# Patient Record
Sex: FEMALE | Race: WHITE | Employment: OTHER | URBAN - METROPOLITAN AREA
[De-identification: names, ages, dates, MRNs, and addresses within clinical notes are randomized per-mention and may not be internally consistent; named-entity substitution may affect disease eponyms.]

---

## 2016-02-13 LAB
LEFT VENTRICULAR EJECTION FRACTION HIGH VALUE: 50 %
LEFT VENTRICULAR EJECTION FRACTION MODE: NORMAL
LV EF: 50 %

## 2017-01-09 ENCOUNTER — OFFICE VISIT (OUTPATIENT)
Dept: CARDIOLOGY CLINIC | Age: 82
End: 2017-01-09

## 2017-01-09 VITALS
HEART RATE: 80 BPM | BODY MASS INDEX: 36.69 KG/M2 | DIASTOLIC BLOOD PRESSURE: 54 MMHG | SYSTOLIC BLOOD PRESSURE: 116 MMHG | HEIGHT: 59 IN | WEIGHT: 182 LBS

## 2017-01-09 DIAGNOSIS — N18.30 CHRONIC KIDNEY DISEASE, STAGE III (MODERATE) (HCC): ICD-10-CM

## 2017-01-09 DIAGNOSIS — I10 ESSENTIAL HYPERTENSION: ICD-10-CM

## 2017-01-09 DIAGNOSIS — I50.42 CHRONIC COMBINED SYSTOLIC AND DIASTOLIC CONGESTIVE HEART FAILURE (HCC): Primary | ICD-10-CM

## 2017-01-09 DIAGNOSIS — Z95.810 BIVENTRICULAR IMPLANTABLE CARDIOVERTER-DEFIBRILLATOR IN SITU: ICD-10-CM

## 2017-01-09 DIAGNOSIS — E11.22 TYPE 2 DIABETES MELLITUS WITH STAGE 3 CHRONIC KIDNEY DISEASE, WITH LONG-TERM CURRENT USE OF INSULIN (HCC): ICD-10-CM

## 2017-01-09 DIAGNOSIS — N18.9 ANEMIA IN CKD (CHRONIC KIDNEY DISEASE): ICD-10-CM

## 2017-01-09 DIAGNOSIS — D63.1 ANEMIA IN CKD (CHRONIC KIDNEY DISEASE): ICD-10-CM

## 2017-01-09 DIAGNOSIS — Z79.4 TYPE 2 DIABETES MELLITUS WITH STAGE 3 CHRONIC KIDNEY DISEASE, WITH LONG-TERM CURRENT USE OF INSULIN (HCC): ICD-10-CM

## 2017-01-09 DIAGNOSIS — I42.8 OTHER PRIMARY CARDIOMYOPATHIES: ICD-10-CM

## 2017-01-09 DIAGNOSIS — N18.30 TYPE 2 DIABETES MELLITUS WITH STAGE 3 CHRONIC KIDNEY DISEASE, WITH LONG-TERM CURRENT USE OF INSULIN (HCC): ICD-10-CM

## 2017-01-09 PROCEDURE — 99213 OFFICE O/P EST LOW 20 MIN: CPT | Performed by: NURSE PRACTITIONER

## 2017-01-10 ENCOUNTER — HOSPITAL ENCOUNTER (OUTPATIENT)
Dept: OTHER | Age: 82
Discharge: HOME | End: 2017-01-10
Attending: NURSE PRACTITIONER | Admitting: NURSE PRACTITIONER

## 2017-01-10 ENCOUNTER — HOSPITAL ENCOUNTER (OUTPATIENT)
Dept: OTHER | Age: 82
Discharge: HOME | End: 2017-01-10
Attending: FAMILY MEDICINE | Admitting: FAMILY MEDICINE

## 2017-01-10 ENCOUNTER — TELEPHONE (OUTPATIENT)
Dept: CARDIOLOGY CLINIC | Age: 82
End: 2017-01-10

## 2017-01-10 LAB
A/G RATIO: 1.3 (ref 1.1–2.2)
ALBUMIN SERPL-MCNC: 4.2 G/DL (ref 3.4–5)
ALP BLD-CCNC: 50 U/L (ref 40–129)
ALT SERPL-CCNC: 22 U/L (ref 10–40)
ANION GAP SERPL CALCULATED.3IONS-SCNC: 16 MMOL/L (ref 3–16)
AST SERPL-CCNC: 23 U/L (ref 15–37)
BASOPHILS ABSOLUTE: 0.1 K/UL (ref 0–0.2)
BASOPHILS RELATIVE PERCENT: 2.2 %
BILIRUB SERPL-MCNC: 0.3 MG/DL (ref 0–1)
BUN BLDV-MCNC: 71 MG/DL (ref 7–20)
CALCIUM SERPL-MCNC: 9.8 MG/DL (ref 8.3–10.6)
CHLORIDE BLD-SCNC: 104 MMOL/L (ref 99–110)
CHOLESTEROL, TOTAL: 182 MG/DL (ref 0–199)
CO2: 22 MMOL/L (ref 21–32)
CREAT SERPL-MCNC: 2 MG/DL (ref 0.6–1.2)
CREATININE URINE: 101.3 MG/DL (ref 28–259)
EOSINOPHILS ABSOLUTE: 0.4 K/UL (ref 0–0.6)
EOSINOPHILS RELATIVE PERCENT: 6.6 %
ESTIMATED AVERAGE GLUCOSE: 145.6 MG/DL
GFR AFRICAN AMERICAN: 28
GFR NON-AFRICAN AMERICAN: 24
GLOBULIN: 3.2 G/DL
GLUCOSE BLD-MCNC: 141 MG/DL (ref 70–99)
HBA1C MFR BLD: 6.7 %
HCT VFR BLD CALC: 32.5 % (ref 36–48)
HDLC SERPL-MCNC: 47 MG/DL (ref 40–60)
HEMOGLOBIN: 10.7 G/DL (ref 12–16)
LDL CHOLESTEROL CALCULATED: 105 MG/DL
LYMPHOCYTES ABSOLUTE: 2.1 K/UL (ref 1–5.1)
LYMPHOCYTES RELATIVE PERCENT: 31.9 %
MCH RBC QN AUTO: 32.2 PG (ref 26–34)
MCHC RBC AUTO-ENTMCNC: 32.9 G/DL (ref 31–36)
MCV RBC AUTO: 97.9 FL (ref 80–100)
MICROALBUMIN UR-MCNC: 11.8 MG/DL
MICROALBUMIN/CREAT UR-RTO: 116.5 MG/G (ref 0–30)
MONOCYTES ABSOLUTE: 0.6 K/UL (ref 0–1.3)
MONOCYTES RELATIVE PERCENT: 9.5 %
NEUTROPHILS ABSOLUTE: 3.3 K/UL (ref 1.7–7.7)
NEUTROPHILS RELATIVE PERCENT: 49.8 %
PDW BLD-RTO: 16.1 % (ref 12.4–15.4)
PLATELET # BLD: 213 K/UL (ref 135–450)
PMV BLD AUTO: 10.1 FL (ref 5–10.5)
POTASSIUM SERPL-SCNC: 4.8 MMOL/L (ref 3.5–5.1)
PRO-BNP: 547 PG/ML (ref 0–449)
RBC # BLD: 3.32 M/UL (ref 4–5.2)
SODIUM BLD-SCNC: 142 MMOL/L (ref 136–145)
TOTAL PROTEIN: 7.4 G/DL (ref 6.4–8.2)
TRIGL SERPL-MCNC: 149 MG/DL (ref 0–150)
VLDLC SERPL CALC-MCNC: 30 MG/DL
WBC # BLD: 6.7 K/UL (ref 4–11)

## 2017-01-13 ENCOUNTER — OFFICE VISIT (OUTPATIENT)
Dept: FAMILY MEDICINE CLINIC | Age: 82
End: 2017-01-13

## 2017-01-13 VITALS
OXYGEN SATURATION: 98 % | BODY MASS INDEX: 36.76 KG/M2 | WEIGHT: 182 LBS | SYSTOLIC BLOOD PRESSURE: 124 MMHG | HEART RATE: 79 BPM | DIASTOLIC BLOOD PRESSURE: 68 MMHG

## 2017-01-13 DIAGNOSIS — N18.4 TYPE 2 DIABETES MELLITUS WITH STAGE 4 CHRONIC KIDNEY DISEASE, WITH LONG-TERM CURRENT USE OF INSULIN (HCC): Primary | ICD-10-CM

## 2017-01-13 DIAGNOSIS — I10 ESSENTIAL HYPERTENSION: ICD-10-CM

## 2017-01-13 DIAGNOSIS — I50.22 CHRONIC SYSTOLIC HEART FAILURE (HCC): ICD-10-CM

## 2017-01-13 DIAGNOSIS — D63.1 ANEMIA IN CKD (CHRONIC KIDNEY DISEASE): ICD-10-CM

## 2017-01-13 DIAGNOSIS — J41.0 SIMPLE CHRONIC BRONCHITIS (HCC): ICD-10-CM

## 2017-01-13 DIAGNOSIS — E11.22 TYPE 2 DIABETES MELLITUS WITH STAGE 4 CHRONIC KIDNEY DISEASE, WITH LONG-TERM CURRENT USE OF INSULIN (HCC): Primary | ICD-10-CM

## 2017-01-13 DIAGNOSIS — Z79.4 TYPE 2 DIABETES MELLITUS WITH STAGE 4 CHRONIC KIDNEY DISEASE, WITH LONG-TERM CURRENT USE OF INSULIN (HCC): Primary | ICD-10-CM

## 2017-01-13 DIAGNOSIS — N18.9 ANEMIA IN CKD (CHRONIC KIDNEY DISEASE): ICD-10-CM

## 2017-01-13 PROCEDURE — G8419 CALC BMI OUT NRM PARAM NOF/U: HCPCS | Performed by: FAMILY MEDICINE

## 2017-01-13 PROCEDURE — G8926 SPIRO NO PERF OR DOC: HCPCS | Performed by: FAMILY MEDICINE

## 2017-01-13 PROCEDURE — G8484 FLU IMMUNIZE NO ADMIN: HCPCS | Performed by: FAMILY MEDICINE

## 2017-01-13 PROCEDURE — 1123F ACP DISCUSS/DSCN MKR DOCD: CPT | Performed by: FAMILY MEDICINE

## 2017-01-13 PROCEDURE — 4040F PNEUMOC VAC/ADMIN/RCVD: CPT | Performed by: FAMILY MEDICINE

## 2017-01-13 PROCEDURE — 1036F TOBACCO NON-USER: CPT | Performed by: FAMILY MEDICINE

## 2017-01-13 PROCEDURE — 1090F PRES/ABSN URINE INCON ASSESS: CPT | Performed by: FAMILY MEDICINE

## 2017-01-13 PROCEDURE — 99214 OFFICE O/P EST MOD 30 MIN: CPT | Performed by: FAMILY MEDICINE

## 2017-01-13 PROCEDURE — G8427 DOCREV CUR MEDS BY ELIG CLIN: HCPCS | Performed by: FAMILY MEDICINE

## 2017-01-13 PROCEDURE — 3023F SPIROM DOC REV: CPT | Performed by: FAMILY MEDICINE

## 2017-01-13 ASSESSMENT — ENCOUNTER SYMPTOMS
BACK PAIN: 0
ABDOMINAL PAIN: 0
SHORTNESS OF BREATH: 1
CONSTIPATION: 0
TROUBLE SWALLOWING: 0
WHEEZING: 0
COUGH: 0

## 2017-02-08 ENCOUNTER — NURSE ONLY (OUTPATIENT)
Dept: CARDIOLOGY CLINIC | Age: 82
End: 2017-02-08

## 2017-02-08 DIAGNOSIS — I42.8 OTHER PRIMARY CARDIOMYOPATHIES: ICD-10-CM

## 2017-02-08 DIAGNOSIS — Z95.810 AUTOMATIC IMPLANTABLE CARDIOVERTER-DEFIBRILLATOR IN SITU: Chronic | ICD-10-CM

## 2017-02-08 DIAGNOSIS — I50.22 CHRONIC SYSTOLIC HEART FAILURE (HCC): ICD-10-CM

## 2017-02-08 PROCEDURE — 93296 REM INTERROG EVL PM/IDS: CPT | Performed by: INTERNAL MEDICINE

## 2017-02-08 PROCEDURE — 93295 DEV INTERROG REMOTE 1/2/MLT: CPT | Performed by: INTERNAL MEDICINE

## 2017-02-08 PROCEDURE — 93297 REM INTERROG DEV EVAL ICPMS: CPT | Performed by: INTERNAL MEDICINE

## 2017-02-14 ENCOUNTER — HOSPITAL ENCOUNTER (OUTPATIENT)
Dept: OTHER | Age: 82
Discharge: HOME | End: 2017-02-14
Attending: FAMILY MEDICINE | Admitting: FAMILY MEDICINE

## 2017-02-14 LAB
ANION GAP SERPL CALCULATED.3IONS-SCNC: 17 MMOL/L (ref 3–16)
BUN BLDV-MCNC: 67 MG/DL (ref 7–20)
CALCIUM SERPL-MCNC: 9.8 MG/DL (ref 8.3–10.6)
CHLORIDE BLD-SCNC: 107 MMOL/L (ref 99–110)
CO2: 20 MMOL/L (ref 21–32)
CREAT SERPL-MCNC: 1.8 MG/DL (ref 0.6–1.2)
GFR AFRICAN AMERICAN: 32
GFR NON-AFRICAN AMERICAN: 27
GLUCOSE BLD-MCNC: 153 MG/DL (ref 70–99)
POTASSIUM SERPL-SCNC: 4.8 MMOL/L (ref 3.5–5.1)
SODIUM BLD-SCNC: 144 MMOL/L (ref 136–145)
URIC ACID, SERUM: 4.8 MG/DL (ref 2.6–6)

## 2017-04-17 ENCOUNTER — HOSPITAL ENCOUNTER (OUTPATIENT)
Dept: OTHER | Age: 82
Discharge: HOME | End: 2017-04-17
Attending: FAMILY MEDICINE | Admitting: FAMILY MEDICINE

## 2017-04-17 LAB
ANION GAP SERPL CALCULATED.3IONS-SCNC: 17 MMOL/L (ref 3–16)
BASOPHILS ABSOLUTE: 0 K/UL (ref 0–0.2)
BASOPHILS RELATIVE PERCENT: 0.7 %
BUN BLDV-MCNC: 60 MG/DL (ref 7–20)
CALCIUM SERPL-MCNC: 9.6 MG/DL (ref 8.3–10.6)
CHLORIDE BLD-SCNC: 107 MMOL/L (ref 99–110)
CO2: 21 MMOL/L (ref 21–32)
CREAT SERPL-MCNC: 1.7 MG/DL (ref 0.6–1.2)
CREATININE URINE: 100.2 MG/DL (ref 28–259)
EOSINOPHILS ABSOLUTE: 0.4 K/UL (ref 0–0.6)
EOSINOPHILS RELATIVE PERCENT: 6.2 %
GFR AFRICAN AMERICAN: 34
GFR NON-AFRICAN AMERICAN: 28
GLUCOSE BLD-MCNC: 57 MG/DL (ref 70–99)
HCT VFR BLD CALC: 32.5 % (ref 36–48)
HEMOGLOBIN: 10.4 G/DL (ref 12–16)
LYMPHOCYTES ABSOLUTE: 2.3 K/UL (ref 1–5.1)
LYMPHOCYTES RELATIVE PERCENT: 33.8 %
MCH RBC QN AUTO: 31.3 PG (ref 26–34)
MCHC RBC AUTO-ENTMCNC: 32.1 G/DL (ref 31–36)
MCV RBC AUTO: 97.7 FL (ref 80–100)
MICROALBUMIN UR-MCNC: 14.9 MG/DL
MICROALBUMIN/CREAT UR-RTO: 148.7 MG/G (ref 0–30)
MONOCYTES ABSOLUTE: 0.7 K/UL (ref 0–1.3)
MONOCYTES RELATIVE PERCENT: 10.8 %
NEUTROPHILS ABSOLUTE: 3.3 K/UL (ref 1.7–7.7)
NEUTROPHILS RELATIVE PERCENT: 48.5 %
PDW BLD-RTO: 15 % (ref 12.4–15.4)
PLATELET # BLD: 217 K/UL (ref 135–450)
PMV BLD AUTO: 10.5 FL (ref 5–10.5)
POTASSIUM SERPL-SCNC: 3.9 MMOL/L (ref 3.5–5.1)
PRO-BNP: 525 PG/ML (ref 0–449)
RBC # BLD: 3.33 M/UL (ref 4–5.2)
SODIUM BLD-SCNC: 145 MMOL/L (ref 136–145)
WBC # BLD: 6.8 K/UL (ref 4–11)

## 2017-04-18 LAB
ESTIMATED AVERAGE GLUCOSE: 165.7 MG/DL
HBA1C MFR BLD: 7.4 %

## 2017-04-21 ENCOUNTER — OFFICE VISIT (OUTPATIENT)
Dept: FAMILY MEDICINE CLINIC | Age: 82
End: 2017-04-21

## 2017-04-21 VITALS
SYSTOLIC BLOOD PRESSURE: 108 MMHG | WEIGHT: 185 LBS | OXYGEN SATURATION: 96 % | BODY MASS INDEX: 37.37 KG/M2 | DIASTOLIC BLOOD PRESSURE: 52 MMHG | HEART RATE: 74 BPM

## 2017-04-21 DIAGNOSIS — I10 ESSENTIAL HYPERTENSION: ICD-10-CM

## 2017-04-21 DIAGNOSIS — D63.1 ANEMIA IN CKD (CHRONIC KIDNEY DISEASE): ICD-10-CM

## 2017-04-21 DIAGNOSIS — J41.0 SIMPLE CHRONIC BRONCHITIS (HCC): Primary | ICD-10-CM

## 2017-04-21 DIAGNOSIS — N18.9 ANEMIA IN CKD (CHRONIC KIDNEY DISEASE): ICD-10-CM

## 2017-04-21 DIAGNOSIS — N18.4 TYPE 2 DIABETES MELLITUS WITH STAGE 4 CHRONIC KIDNEY DISEASE, WITH LONG-TERM CURRENT USE OF INSULIN (HCC): ICD-10-CM

## 2017-04-21 DIAGNOSIS — I50.42 CHRONIC COMBINED SYSTOLIC AND DIASTOLIC CONGESTIVE HEART FAILURE (HCC): ICD-10-CM

## 2017-04-21 DIAGNOSIS — Z79.4 TYPE 2 DIABETES MELLITUS WITH STAGE 4 CHRONIC KIDNEY DISEASE, WITH LONG-TERM CURRENT USE OF INSULIN (HCC): ICD-10-CM

## 2017-04-21 DIAGNOSIS — E11.22 TYPE 2 DIABETES MELLITUS WITH STAGE 4 CHRONIC KIDNEY DISEASE, WITH LONG-TERM CURRENT USE OF INSULIN (HCC): ICD-10-CM

## 2017-04-21 DIAGNOSIS — M81.0 OSTEOPOROSIS: ICD-10-CM

## 2017-04-21 PROCEDURE — 3023F SPIROM DOC REV: CPT | Performed by: FAMILY MEDICINE

## 2017-04-21 PROCEDURE — G8427 DOCREV CUR MEDS BY ELIG CLIN: HCPCS | Performed by: FAMILY MEDICINE

## 2017-04-21 PROCEDURE — 1123F ACP DISCUSS/DSCN MKR DOCD: CPT | Performed by: FAMILY MEDICINE

## 2017-04-21 PROCEDURE — G8417 CALC BMI ABV UP PARAM F/U: HCPCS | Performed by: FAMILY MEDICINE

## 2017-04-21 PROCEDURE — 99214 OFFICE O/P EST MOD 30 MIN: CPT | Performed by: FAMILY MEDICINE

## 2017-04-21 PROCEDURE — G8926 SPIRO NO PERF OR DOC: HCPCS | Performed by: FAMILY MEDICINE

## 2017-04-21 PROCEDURE — 4040F PNEUMOC VAC/ADMIN/RCVD: CPT | Performed by: FAMILY MEDICINE

## 2017-04-21 PROCEDURE — 1090F PRES/ABSN URINE INCON ASSESS: CPT | Performed by: FAMILY MEDICINE

## 2017-04-21 PROCEDURE — 1036F TOBACCO NON-USER: CPT | Performed by: FAMILY MEDICINE

## 2017-04-21 PROCEDURE — 4005F PHARM THX FOR OP RXD: CPT | Performed by: FAMILY MEDICINE

## 2017-04-21 ASSESSMENT — ENCOUNTER SYMPTOMS
SHORTNESS OF BREATH: 1
ABDOMINAL PAIN: 0
BACK PAIN: 0
COUGH: 0
TROUBLE SWALLOWING: 0
WHEEZING: 0
CONSTIPATION: 0

## 2017-05-08 ENCOUNTER — HOSPITAL ENCOUNTER (OUTPATIENT)
Dept: GENERAL RADIOLOGY | Age: 82
Discharge: HOME | End: 2017-05-08
Attending: FAMILY MEDICINE | Admitting: FAMILY MEDICINE

## 2017-05-08 DIAGNOSIS — M81.0 AGE-RELATED OSTEOPOROSIS WITHOUT CURRENT PATHOLOGICAL FRACTURE: ICD-10-CM

## 2017-05-08 DIAGNOSIS — M81.0 OSTEOPOROSIS: ICD-10-CM

## 2017-05-10 ENCOUNTER — NURSE ONLY (OUTPATIENT)
Dept: CARDIOLOGY CLINIC | Age: 82
End: 2017-05-10

## 2017-05-10 DIAGNOSIS — Z95.810 AUTOMATIC IMPLANTABLE CARDIOVERTER-DEFIBRILLATOR IN SITU: Chronic | ICD-10-CM

## 2017-05-10 DIAGNOSIS — I50.22 CHRONIC SYSTOLIC HEART FAILURE (HCC): ICD-10-CM

## 2017-05-10 DIAGNOSIS — I42.8 OTHER PRIMARY CARDIOMYOPATHIES: ICD-10-CM

## 2017-05-15 ENCOUNTER — OFFICE VISIT (OUTPATIENT)
Dept: CARDIOLOGY CLINIC | Age: 82
End: 2017-05-15

## 2017-05-15 VITALS
HEIGHT: 59 IN | WEIGHT: 185 LBS | SYSTOLIC BLOOD PRESSURE: 120 MMHG | DIASTOLIC BLOOD PRESSURE: 76 MMHG | BODY MASS INDEX: 37.29 KG/M2

## 2017-05-15 DIAGNOSIS — Z95.810 AUTOMATIC IMPLANTABLE CARDIOVERTER-DEFIBRILLATOR IN SITU: Chronic | ICD-10-CM

## 2017-05-15 DIAGNOSIS — I42.8 OTHER PRIMARY CARDIOMYOPATHIES: ICD-10-CM

## 2017-05-15 DIAGNOSIS — I50.22 CHRONIC SYSTOLIC HEART FAILURE (HCC): Primary | ICD-10-CM

## 2017-05-15 DIAGNOSIS — I10 ESSENTIAL HYPERTENSION: ICD-10-CM

## 2017-05-15 PROCEDURE — 99214 OFFICE O/P EST MOD 30 MIN: CPT | Performed by: INTERNAL MEDICINE

## 2017-05-15 PROCEDURE — G8427 DOCREV CUR MEDS BY ELIG CLIN: HCPCS | Performed by: INTERNAL MEDICINE

## 2017-05-15 PROCEDURE — 1123F ACP DISCUSS/DSCN MKR DOCD: CPT | Performed by: INTERNAL MEDICINE

## 2017-05-15 PROCEDURE — G8417 CALC BMI ABV UP PARAM F/U: HCPCS | Performed by: INTERNAL MEDICINE

## 2017-05-15 PROCEDURE — 1036F TOBACCO NON-USER: CPT | Performed by: INTERNAL MEDICINE

## 2017-05-15 PROCEDURE — 1090F PRES/ABSN URINE INCON ASSESS: CPT | Performed by: INTERNAL MEDICINE

## 2017-05-15 PROCEDURE — 4040F PNEUMOC VAC/ADMIN/RCVD: CPT | Performed by: INTERNAL MEDICINE

## 2017-05-17 ENCOUNTER — OFFICE VISIT (OUTPATIENT)
Dept: FAMILY MEDICINE CLINIC | Age: 82
End: 2017-05-17

## 2017-05-17 VITALS
HEART RATE: 71 BPM | SYSTOLIC BLOOD PRESSURE: 110 MMHG | OXYGEN SATURATION: 92 % | DIASTOLIC BLOOD PRESSURE: 60 MMHG | WEIGHT: 184.4 LBS | BODY MASS INDEX: 37.24 KG/M2

## 2017-05-17 DIAGNOSIS — Z85.828 HISTORY OF SKIN CANCER: ICD-10-CM

## 2017-05-17 DIAGNOSIS — M81.0 OSTEOPOROSIS: Primary | ICD-10-CM

## 2017-05-17 PROCEDURE — 1090F PRES/ABSN URINE INCON ASSESS: CPT | Performed by: FAMILY MEDICINE

## 2017-05-17 PROCEDURE — 4005F PHARM THX FOR OP RXD: CPT | Performed by: FAMILY MEDICINE

## 2017-05-17 PROCEDURE — 99213 OFFICE O/P EST LOW 20 MIN: CPT | Performed by: FAMILY MEDICINE

## 2017-05-17 PROCEDURE — 1036F TOBACCO NON-USER: CPT | Performed by: FAMILY MEDICINE

## 2017-05-17 PROCEDURE — 1123F ACP DISCUSS/DSCN MKR DOCD: CPT | Performed by: FAMILY MEDICINE

## 2017-05-17 PROCEDURE — G8417 CALC BMI ABV UP PARAM F/U: HCPCS | Performed by: FAMILY MEDICINE

## 2017-05-17 PROCEDURE — G8427 DOCREV CUR MEDS BY ELIG CLIN: HCPCS | Performed by: FAMILY MEDICINE

## 2017-05-17 PROCEDURE — 4040F PNEUMOC VAC/ADMIN/RCVD: CPT | Performed by: FAMILY MEDICINE

## 2017-05-17 ASSESSMENT — PATIENT HEALTH QUESTIONNAIRE - PHQ9
SUM OF ALL RESPONSES TO PHQ QUESTIONS 1-9: 0
2. FEELING DOWN, DEPRESSED OR HOPELESS: 0
SUM OF ALL RESPONSES TO PHQ9 QUESTIONS 1 & 2: 0
1. LITTLE INTEREST OR PLEASURE IN DOING THINGS: 0

## 2017-05-18 ENCOUNTER — HOSPITAL ENCOUNTER (OUTPATIENT)
Dept: OTHER | Age: 82
Discharge: HOME | End: 2017-05-18
Attending: FAMILY MEDICINE | Admitting: FAMILY MEDICINE

## 2017-05-19 LAB — VITAMIN D 25-HYDROXY: 64.1 NG/ML

## 2017-07-11 ENCOUNTER — HOSPITAL ENCOUNTER (OUTPATIENT)
Dept: OTHER | Age: 82
Discharge: HOME | End: 2017-07-11
Attending: FAMILY MEDICINE | Admitting: FAMILY MEDICINE

## 2017-07-11 LAB
ANION GAP SERPL CALCULATED.3IONS-SCNC: 19 MMOL/L (ref 3–16)
BASOPHILS ABSOLUTE: 0.1 K/UL (ref 0–0.2)
BASOPHILS RELATIVE PERCENT: 0.9 %
BUN BLDV-MCNC: 69 MG/DL (ref 7–20)
CALCIUM SERPL-MCNC: 9.6 MG/DL (ref 8.3–10.6)
CHLORIDE BLD-SCNC: 104 MMOL/L (ref 99–110)
CO2: 21 MMOL/L (ref 21–32)
CREAT SERPL-MCNC: 1.8 MG/DL (ref 0.6–1.2)
EOSINOPHILS ABSOLUTE: 0.5 K/UL (ref 0–0.6)
EOSINOPHILS RELATIVE PERCENT: 6.9 %
ESTIMATED AVERAGE GLUCOSE: 159.9 MG/DL
GFR AFRICAN AMERICAN: 32
GFR NON-AFRICAN AMERICAN: 27
GLUCOSE BLD-MCNC: 118 MG/DL (ref 70–99)
HBA1C MFR BLD: 7.2 %
HCT VFR BLD CALC: 32.2 % (ref 36–48)
HEMOGLOBIN: 10.5 G/DL (ref 12–16)
LYMPHOCYTES ABSOLUTE: 2.4 K/UL (ref 1–5.1)
LYMPHOCYTES RELATIVE PERCENT: 35.5 %
MCH RBC QN AUTO: 32 PG (ref 26–34)
MCHC RBC AUTO-ENTMCNC: 32.6 G/DL (ref 31–36)
MCV RBC AUTO: 98 FL (ref 80–100)
MONOCYTES ABSOLUTE: 0.6 K/UL (ref 0–1.3)
MONOCYTES RELATIVE PERCENT: 8.8 %
NEUTROPHILS ABSOLUTE: 3.2 K/UL (ref 1.7–7.7)
NEUTROPHILS RELATIVE PERCENT: 47.9 %
PDW BLD-RTO: 15.8 % (ref 12.4–15.4)
PLATELET # BLD: 220 K/UL (ref 135–450)
PMV BLD AUTO: 10.7 FL (ref 5–10.5)
POTASSIUM SERPL-SCNC: 4 MMOL/L (ref 3.5–5.1)
RBC # BLD: 3.28 M/UL (ref 4–5.2)
SODIUM BLD-SCNC: 144 MMOL/L (ref 136–145)
VITAMIN D 25-HYDROXY: 56.9 NG/ML
WBC # BLD: 6.8 K/UL (ref 4–11)

## 2017-07-18 ENCOUNTER — OFFICE VISIT (OUTPATIENT)
Dept: FAMILY MEDICINE CLINIC | Age: 82
End: 2017-07-18

## 2017-07-18 VITALS
SYSTOLIC BLOOD PRESSURE: 128 MMHG | DIASTOLIC BLOOD PRESSURE: 54 MMHG | WEIGHT: 185 LBS | BODY MASS INDEX: 37.37 KG/M2 | HEART RATE: 73 BPM | OXYGEN SATURATION: 97 %

## 2017-07-18 DIAGNOSIS — N18.9 ANEMIA IN CKD (CHRONIC KIDNEY DISEASE): ICD-10-CM

## 2017-07-18 DIAGNOSIS — G89.29 CHRONIC MIDLINE LOW BACK PAIN WITHOUT SCIATICA: ICD-10-CM

## 2017-07-18 DIAGNOSIS — Z85.828 HISTORY OF SKIN CANCER: ICD-10-CM

## 2017-07-18 DIAGNOSIS — M54.50 CHRONIC MIDLINE LOW BACK PAIN WITHOUT SCIATICA: ICD-10-CM

## 2017-07-18 DIAGNOSIS — I50.42 CHRONIC COMBINED SYSTOLIC AND DIASTOLIC CONGESTIVE HEART FAILURE (HCC): Chronic | ICD-10-CM

## 2017-07-18 DIAGNOSIS — D63.1 ANEMIA IN CKD (CHRONIC KIDNEY DISEASE): ICD-10-CM

## 2017-07-18 DIAGNOSIS — N18.4 TYPE 2 DIABETES MELLITUS WITH STAGE 4 CHRONIC KIDNEY DISEASE, WITH LONG-TERM CURRENT USE OF INSULIN (HCC): Primary | ICD-10-CM

## 2017-07-18 DIAGNOSIS — E11.22 TYPE 2 DIABETES MELLITUS WITH STAGE 4 CHRONIC KIDNEY DISEASE, WITH LONG-TERM CURRENT USE OF INSULIN (HCC): Primary | ICD-10-CM

## 2017-07-18 DIAGNOSIS — Z79.4 TYPE 2 DIABETES MELLITUS WITH STAGE 4 CHRONIC KIDNEY DISEASE, WITH LONG-TERM CURRENT USE OF INSULIN (HCC): Primary | ICD-10-CM

## 2017-07-18 PROCEDURE — 99214 OFFICE O/P EST MOD 30 MIN: CPT | Performed by: FAMILY MEDICINE

## 2017-07-18 PROCEDURE — 4040F PNEUMOC VAC/ADMIN/RCVD: CPT | Performed by: FAMILY MEDICINE

## 2017-07-18 PROCEDURE — G8427 DOCREV CUR MEDS BY ELIG CLIN: HCPCS | Performed by: FAMILY MEDICINE

## 2017-07-18 PROCEDURE — 1090F PRES/ABSN URINE INCON ASSESS: CPT | Performed by: FAMILY MEDICINE

## 2017-07-18 PROCEDURE — G8417 CALC BMI ABV UP PARAM F/U: HCPCS | Performed by: FAMILY MEDICINE

## 2017-07-18 PROCEDURE — 1123F ACP DISCUSS/DSCN MKR DOCD: CPT | Performed by: FAMILY MEDICINE

## 2017-07-18 PROCEDURE — 1036F TOBACCO NON-USER: CPT | Performed by: FAMILY MEDICINE

## 2017-07-18 ASSESSMENT — ENCOUNTER SYMPTOMS
SHORTNESS OF BREATH: 1
CONSTIPATION: 0
TROUBLE SWALLOWING: 0
COUGH: 0
WHEEZING: 0
BACK PAIN: 1
ABDOMINAL PAIN: 0

## 2017-07-21 RX ORDER — ALENDRONATE SODIUM 70 MG/1
TABLET ORAL
Qty: 12 TABLET | Refills: 3 | Status: SHIPPED | OUTPATIENT
Start: 2017-07-21 | End: 2018-03-24 | Stop reason: SDUPTHER

## 2017-07-24 ENCOUNTER — HOSPITAL ENCOUNTER (OUTPATIENT)
Dept: OTHER | Age: 82
Discharge: HOME | End: 2017-07-24
Attending: FAMILY MEDICINE | Admitting: FAMILY MEDICINE

## 2017-07-24 DIAGNOSIS — M54.5 LOW BACK PAIN, UNSPECIFIED BACK PAIN LATERALITY, UNSPECIFIED CHRONICITY, WITH SCIATICA PRESENCE UNSPECIFIED: ICD-10-CM

## 2017-07-25 RX ORDER — ATORVASTATIN CALCIUM 40 MG/1
TABLET, FILM COATED ORAL
Qty: 90 TABLET | Refills: 0 | Status: SHIPPED | OUTPATIENT
Start: 2017-07-25 | End: 2017-10-12 | Stop reason: SDUPTHER

## 2017-07-25 RX ORDER — GEMFIBROZIL 600 MG/1
TABLET, FILM COATED ORAL
Qty: 180 TABLET | Refills: 0 | Status: SHIPPED | OUTPATIENT
Start: 2017-07-25 | End: 2017-10-12 | Stop reason: SDUPTHER

## 2017-07-26 ENCOUNTER — HOSPITAL ENCOUNTER (OUTPATIENT)
Dept: MAMMOGRAPHY | Age: 82
Discharge: HOME | End: 2017-07-26
Attending: FAMILY MEDICINE | Admitting: FAMILY MEDICINE

## 2017-07-26 DIAGNOSIS — Z85.3 PERSONAL HISTORY OF BREAST CANCER: ICD-10-CM

## 2017-07-26 DIAGNOSIS — Z12.31 ENCOUNTER FOR SCREENING MAMMOGRAM FOR BREAST CANCER: ICD-10-CM

## 2017-08-16 ENCOUNTER — NURSE ONLY (OUTPATIENT)
Dept: CARDIOLOGY CLINIC | Age: 82
End: 2017-08-16

## 2017-08-16 DIAGNOSIS — Z95.810 AUTOMATIC IMPLANTABLE CARDIOVERTER-DEFIBRILLATOR IN SITU: Chronic | ICD-10-CM

## 2017-08-16 DIAGNOSIS — I50.22 CHRONIC SYSTOLIC HEART FAILURE (HCC): Chronic | ICD-10-CM

## 2017-08-16 DIAGNOSIS — I42.9 PRIMARY CARDIOMYOPATHY (HCC): Chronic | ICD-10-CM

## 2017-08-16 PROCEDURE — 93296 REM INTERROG EVL PM/IDS: CPT | Performed by: INTERNAL MEDICINE

## 2017-08-16 PROCEDURE — 93295 DEV INTERROG REMOTE 1/2/MLT: CPT | Performed by: INTERNAL MEDICINE

## 2017-08-16 PROCEDURE — 93297 REM INTERROG DEV EVAL ICPMS: CPT | Performed by: INTERNAL MEDICINE

## 2017-10-12 RX ORDER — GEMFIBROZIL 600 MG/1
TABLET, FILM COATED ORAL
Qty: 180 TABLET | Refills: 0 | Status: SHIPPED | OUTPATIENT
Start: 2017-10-12 | End: 2017-12-14 | Stop reason: SDUPTHER

## 2017-10-12 RX ORDER — INSULIN GLARGINE 100 [IU]/ML
60 INJECTION, SOLUTION SUBCUTANEOUS NIGHTLY
Qty: 60 ML | Refills: 0 | Status: SHIPPED | OUTPATIENT
Start: 2017-10-12 | End: 2017-12-14 | Stop reason: SDUPTHER

## 2017-10-12 RX ORDER — GABAPENTIN 100 MG/1
CAPSULE ORAL
Qty: 90 CAPSULE | Refills: 0 | Status: SHIPPED | OUTPATIENT
Start: 2017-10-12 | End: 2017-12-14 | Stop reason: SDUPTHER

## 2017-10-12 RX ORDER — FUROSEMIDE 40 MG/1
40 TABLET ORAL DAILY
Qty: 90 TABLET | Refills: 2 | Status: SHIPPED | OUTPATIENT
Start: 2017-10-12 | End: 2017-11-06 | Stop reason: SDUPTHER

## 2017-10-12 RX ORDER — ATORVASTATIN CALCIUM 40 MG/1
TABLET, FILM COATED ORAL
Qty: 90 TABLET | Refills: 0 | Status: SHIPPED | OUTPATIENT
Start: 2017-10-12 | End: 2017-12-14 | Stop reason: SDUPTHER

## 2017-10-12 RX ORDER — SPIRONOLACTONE 25 MG/1
25 TABLET ORAL DAILY
Qty: 90 TABLET | Refills: 2 | Status: ON HOLD | OUTPATIENT
Start: 2017-10-12 | End: 2017-12-13 | Stop reason: HOSPADM

## 2017-10-12 RX ORDER — CARVEDILOL 3.12 MG/1
TABLET ORAL
Qty: 180 TABLET | Refills: 3 | Status: SHIPPED | OUTPATIENT
Start: 2017-10-12 | End: 2018-06-28 | Stop reason: SDUPTHER

## 2017-10-12 RX ORDER — ALLOPURINOL 100 MG/1
200 TABLET ORAL DAILY
Qty: 180 TABLET | Refills: 0 | Status: SHIPPED | OUTPATIENT
Start: 2017-10-12 | End: 2017-12-14 | Stop reason: SDUPTHER

## 2017-10-12 RX ORDER — LOSARTAN POTASSIUM 50 MG/1
50 TABLET ORAL DAILY
Qty: 90 TABLET | Refills: 2 | Status: ON HOLD | OUTPATIENT
Start: 2017-10-12 | End: 2017-12-13 | Stop reason: HOSPADM

## 2017-10-27 RX ORDER — INSULIN LISPRO 100 [IU]/ML
25 INJECTION, SOLUTION INTRAVENOUS; SUBCUTANEOUS 3 TIMES DAILY
Qty: 30 ML | Refills: 0 | Status: SHIPPED | OUTPATIENT
Start: 2017-10-27 | End: 2017-11-10 | Stop reason: SDUPTHER

## 2017-10-27 RX ORDER — DIGOXIN 125 MCG
125 TABLET ORAL
Qty: 90 TABLET | Refills: 3 | Status: SHIPPED | OUTPATIENT
Start: 2017-10-27 | End: 2018-12-05 | Stop reason: SDUPTHER

## 2017-10-30 ENCOUNTER — HOSPITAL ENCOUNTER (OUTPATIENT)
Dept: OTHER | Age: 82
Discharge: HOME | End: 2017-10-30
Attending: FAMILY MEDICINE | Admitting: FAMILY MEDICINE

## 2017-10-30 DIAGNOSIS — Z79.4 TYPE 2 DIABETES MELLITUS WITH STAGE 4 CHRONIC KIDNEY DISEASE, WITH LONG-TERM CURRENT USE OF INSULIN (HCC): ICD-10-CM

## 2017-10-30 DIAGNOSIS — N18.9 ANEMIA IN CKD (CHRONIC KIDNEY DISEASE): ICD-10-CM

## 2017-10-30 DIAGNOSIS — E11.22 TYPE 2 DIABETES MELLITUS WITH STAGE 4 CHRONIC KIDNEY DISEASE, WITH LONG-TERM CURRENT USE OF INSULIN (HCC): ICD-10-CM

## 2017-10-30 DIAGNOSIS — I50.42 CHRONIC COMBINED SYSTOLIC AND DIASTOLIC CONGESTIVE HEART FAILURE (HCC): Chronic | ICD-10-CM

## 2017-10-30 DIAGNOSIS — N18.4 TYPE 2 DIABETES MELLITUS WITH STAGE 4 CHRONIC KIDNEY DISEASE, WITH LONG-TERM CURRENT USE OF INSULIN (HCC): ICD-10-CM

## 2017-10-30 DIAGNOSIS — D63.1 ANEMIA IN CKD (CHRONIC KIDNEY DISEASE): ICD-10-CM

## 2017-10-30 LAB
ANION GAP SERPL CALCULATED.3IONS-SCNC: 19 MMOL/L (ref 3–16)
BUN BLDV-MCNC: 74 MG/DL (ref 7–20)
CALCIUM SERPL-MCNC: 10 MG/DL (ref 8.3–10.6)
CHLORIDE BLD-SCNC: 103 MMOL/L (ref 99–110)
CO2: 22 MMOL/L (ref 21–32)
CREAT SERPL-MCNC: 2 MG/DL (ref 0.6–1.2)
ESTIMATED AVERAGE GLUCOSE: 151.3 MG/DL
GFR AFRICAN AMERICAN: 28
GFR NON-AFRICAN AMERICAN: 23
GLUCOSE BLD-MCNC: 153 MG/DL (ref 70–99)
HBA1C MFR BLD: 6.9 %
HCT VFR BLD CALC: 29.9 % (ref 36–48)
HEMOGLOBIN: 9.7 G/DL (ref 12–16)
MCH RBC QN AUTO: 32 PG (ref 26–34)
MCHC RBC AUTO-ENTMCNC: 32.3 G/DL (ref 31–36)
MCV RBC AUTO: 99.2 FL (ref 80–100)
PDW BLD-RTO: 15.4 % (ref 12.4–15.4)
PLATELET # BLD: 204 K/UL (ref 135–450)
PMV BLD AUTO: 10.8 FL (ref 5–10.5)
POTASSIUM SERPL-SCNC: 5 MMOL/L (ref 3.5–5.1)
RBC # BLD: 3.02 M/UL (ref 4–5.2)
SODIUM BLD-SCNC: 144 MMOL/L (ref 136–145)
WBC # BLD: 6 K/UL (ref 4–11)

## 2017-11-06 ENCOUNTER — OFFICE VISIT (OUTPATIENT)
Dept: FAMILY MEDICINE CLINIC | Age: 82
End: 2017-11-06

## 2017-11-06 VITALS
WEIGHT: 185 LBS | SYSTOLIC BLOOD PRESSURE: 128 MMHG | OXYGEN SATURATION: 99 % | HEART RATE: 69 BPM | DIASTOLIC BLOOD PRESSURE: 60 MMHG | BODY MASS INDEX: 37.37 KG/M2

## 2017-11-06 DIAGNOSIS — N18.4 ANEMIA IN STAGE 4 CHRONIC KIDNEY DISEASE (HCC): ICD-10-CM

## 2017-11-06 DIAGNOSIS — D63.1 ANEMIA IN STAGE 4 CHRONIC KIDNEY DISEASE (HCC): ICD-10-CM

## 2017-11-06 DIAGNOSIS — Z79.4 TYPE 2 DIABETES MELLITUS WITH STAGE 4 CHRONIC KIDNEY DISEASE, WITH LONG-TERM CURRENT USE OF INSULIN (HCC): Primary | ICD-10-CM

## 2017-11-06 DIAGNOSIS — E11.22 TYPE 2 DIABETES MELLITUS WITH STAGE 4 CHRONIC KIDNEY DISEASE, WITH LONG-TERM CURRENT USE OF INSULIN (HCC): Primary | ICD-10-CM

## 2017-11-06 DIAGNOSIS — I10 ESSENTIAL HYPERTENSION: Chronic | ICD-10-CM

## 2017-11-06 DIAGNOSIS — I42.9 PRIMARY CARDIOMYOPATHY (HCC): Chronic | ICD-10-CM

## 2017-11-06 DIAGNOSIS — N18.4 TYPE 2 DIABETES MELLITUS WITH STAGE 4 CHRONIC KIDNEY DISEASE, WITH LONG-TERM CURRENT USE OF INSULIN (HCC): Primary | ICD-10-CM

## 2017-11-06 PROCEDURE — 90662 IIV NO PRSV INCREASED AG IM: CPT | Performed by: FAMILY MEDICINE

## 2017-11-06 PROCEDURE — 1090F PRES/ABSN URINE INCON ASSESS: CPT | Performed by: FAMILY MEDICINE

## 2017-11-06 PROCEDURE — 1123F ACP DISCUSS/DSCN MKR DOCD: CPT | Performed by: FAMILY MEDICINE

## 2017-11-06 PROCEDURE — G8417 CALC BMI ABV UP PARAM F/U: HCPCS | Performed by: FAMILY MEDICINE

## 2017-11-06 PROCEDURE — 4040F PNEUMOC VAC/ADMIN/RCVD: CPT | Performed by: FAMILY MEDICINE

## 2017-11-06 PROCEDURE — G8484 FLU IMMUNIZE NO ADMIN: HCPCS | Performed by: FAMILY MEDICINE

## 2017-11-06 PROCEDURE — G8427 DOCREV CUR MEDS BY ELIG CLIN: HCPCS | Performed by: FAMILY MEDICINE

## 2017-11-06 PROCEDURE — 1036F TOBACCO NON-USER: CPT | Performed by: FAMILY MEDICINE

## 2017-11-06 PROCEDURE — 99214 OFFICE O/P EST MOD 30 MIN: CPT | Performed by: FAMILY MEDICINE

## 2017-11-06 PROCEDURE — G0008 ADMIN INFLUENZA VIRUS VAC: HCPCS | Performed by: FAMILY MEDICINE

## 2017-11-06 RX ORDER — FUROSEMIDE 40 MG/1
40 TABLET ORAL DAILY
Qty: 90 TABLET | Refills: 2 | Status: SHIPPED | OUTPATIENT
Start: 2017-11-06 | End: 2018-01-22 | Stop reason: SDUPTHER

## 2017-11-06 ASSESSMENT — ENCOUNTER SYMPTOMS
SHORTNESS OF BREATH: 1
BACK PAIN: 0
COUGH: 0
BLURRED VISION: 0
HEARTBURN: 0
CONSTIPATION: 0
ABDOMINAL PAIN: 0

## 2017-11-06 NOTE — PROGRESS NOTES
facility-administered medications on file prior to visit. Review of Systems   Constitutional: Negative for malaise/fatigue. HENT: Negative for hearing loss. Eyes: Negative for blurred vision. Respiratory: Positive for shortness of breath. Negative for cough. Cardiovascular: Positive for leg swelling. Negative for chest pain. Gastrointestinal: Negative for abdominal pain, constipation and heartburn. Musculoskeletal: Negative for back pain, joint pain and myalgias. Skin: Negative for rash. Neurological: Negative for sensory change, focal weakness and weakness. Endo/Heme/Allergies: Negative for polydipsia. Psychiatric/Behavioral: The patient does not have insomnia. Physical Exam   Constitutional: She is oriented to person, place, and time. She appears well-developed and well-nourished. Eyes: Pupils are equal, round, and reactive to light. Cardiovascular: Normal rate, regular rhythm, normal heart sounds and intact distal pulses. Rate 68   Pulmonary/Chest: Effort normal and breath sounds normal.   Abdominal: She exhibits no distension. There is no tenderness. Musculoskeletal: She exhibits edema (trace). oseophyte change left knee   Neurological: She is alert and oriented to person, place, and time. Gait (shuffles some) abnormal.   10 gram fiber sensation intact   Psychiatric: She has a normal mood and affect. Piyush Starks was seen today for diabetes, hypertension, chronic kidney disease, copd, osteoporosis, asthma and cardiomyopathy. Diagnoses and all orders for this visit:    Type 2 diabetes mellitus with stage 4 chronic kidney disease, with long-term current use of insulin (HCC)    Anemia in stage 4 chronic kidney disease (HCC)  -     Ferritin; Future  -     CBC WITH AUTO DIFFERENTIAL; Future  -     Retic Count;  Future    Primary cardiomyopathy ([facility] [address]    Essential hypertension    Other orders  -     INFLUENZA, HIGH DOSE, 65 YRS +, IM, PF, PREFILL SYR, 0.5ML

## 2017-11-10 RX ORDER — INSULIN LISPRO 100 [IU]/ML
25 INJECTION, SOLUTION INTRAVENOUS; SUBCUTANEOUS 3 TIMES DAILY
Qty: 15 PEN | Refills: 3 | Status: SHIPPED | OUTPATIENT
Start: 2017-11-10 | End: 2018-03-16 | Stop reason: SDUPTHER

## 2017-11-17 ENCOUNTER — PROCEDURE VISIT (OUTPATIENT)
Dept: CARDIOLOGY CLINIC | Age: 82
End: 2017-11-17

## 2017-11-17 ENCOUNTER — OFFICE VISIT (OUTPATIENT)
Dept: CARDIOLOGY CLINIC | Age: 82
End: 2017-11-17

## 2017-11-17 VITALS
BODY MASS INDEX: 37.5 KG/M2 | WEIGHT: 186 LBS | HEART RATE: 72 BPM | DIASTOLIC BLOOD PRESSURE: 40 MMHG | SYSTOLIC BLOOD PRESSURE: 104 MMHG | HEIGHT: 59 IN

## 2017-11-17 DIAGNOSIS — I10 ESSENTIAL HYPERTENSION: Chronic | ICD-10-CM

## 2017-11-17 DIAGNOSIS — N18.4 ANEMIA IN STAGE 4 CHRONIC KIDNEY DISEASE (HCC): ICD-10-CM

## 2017-11-17 DIAGNOSIS — I50.22 CHRONIC SYSTOLIC HEART FAILURE (HCC): Primary | Chronic | ICD-10-CM

## 2017-11-17 DIAGNOSIS — I50.22 CHRONIC SYSTOLIC HEART FAILURE (HCC): Chronic | ICD-10-CM

## 2017-11-17 DIAGNOSIS — D63.1 ANEMIA IN STAGE 4 CHRONIC KIDNEY DISEASE (HCC): ICD-10-CM

## 2017-11-17 DIAGNOSIS — Z95.810 BIVENTRICULAR IMPLANTABLE CARDIOVERTER-DEFIBRILLATOR IN SITU: ICD-10-CM

## 2017-11-17 DIAGNOSIS — Z95.810 AUTOMATIC IMPLANTABLE CARDIOVERTER-DEFIBRILLATOR IN SITU: Chronic | ICD-10-CM

## 2017-11-17 PROCEDURE — 93284 PRGRMG EVAL IMPLANTABLE DFB: CPT | Performed by: INTERNAL MEDICINE

## 2017-11-17 PROCEDURE — G8484 FLU IMMUNIZE NO ADMIN: HCPCS | Performed by: INTERNAL MEDICINE

## 2017-11-17 PROCEDURE — 4040F PNEUMOC VAC/ADMIN/RCVD: CPT | Performed by: INTERNAL MEDICINE

## 2017-11-17 PROCEDURE — 1090F PRES/ABSN URINE INCON ASSESS: CPT | Performed by: INTERNAL MEDICINE

## 2017-11-17 PROCEDURE — 99214 OFFICE O/P EST MOD 30 MIN: CPT | Performed by: INTERNAL MEDICINE

## 2017-11-17 PROCEDURE — G8427 DOCREV CUR MEDS BY ELIG CLIN: HCPCS | Performed by: INTERNAL MEDICINE

## 2017-11-17 PROCEDURE — G8417 CALC BMI ABV UP PARAM F/U: HCPCS | Performed by: INTERNAL MEDICINE

## 2017-11-17 PROCEDURE — 93290 INTERROG DEV EVAL ICPMS IP: CPT | Performed by: INTERNAL MEDICINE

## 2017-11-17 PROCEDURE — 1036F TOBACCO NON-USER: CPT | Performed by: INTERNAL MEDICINE

## 2017-11-17 PROCEDURE — 1123F ACP DISCUSS/DSCN MKR DOCD: CPT | Performed by: INTERNAL MEDICINE

## 2017-11-17 NOTE — PROGRESS NOTES
[address]   Advanced Heart Failure/Pulmonary Hypertension  Cardiac Evaluation      [facility]  YOB: 1929    Date of Visit:  11/17/17    Chief Complaint   Patient presents with    Congestive Heart Failure    Shortness of Breath     with activity       History of Present Illness:  I had the pleasure of seeing Vinny Guerrero in follow up for s/d CHF, NICM, HTN, s/p BiV ICD (2005) as well as HLD, DM, CVD as well has hx of breast cancer and anemia. She gets steroid knee injections weekly X 3 in left knee which helped her ambulate. Today, she reports doing well cardiac wise. She had recent labs and her kidney looked a little dry and her furosemide was decreased by Dr. Shanell Rueda. Her potassium is borderline  high and she eats [place] a day. She has been feeling good and has had no energy problems. She is wearing support hose. She denies shortness of breath except with exertion, no change. Her weight is stable. She will get a device interrogation with EP after this visit. Vinny Guerrero denies dyspnea, fatigue, chest pain, palpitations, orthopnea, PND, early saiety, edema, syncope. Recent Hospitalization or Testing:   Echo 2/13/16:    Technically limited study due to body habitus. Left ventricular size is mildly increased . Left ventricular function is borderline with ejection fraction estimated at 50 %. Small posterobasal LV aneurysm seen on parasternal long views. Moderate to severe mitral regurgitation is present. The left atrium is dilated. Pacer / ICD wire is visualized in the right ventricle. There is mild-moderate tricuspid regurgitation with RVSP estimated at 43 mmHg. NYHA:   II  ACC/ AHA Stage:    C    Past Medical History:   has a past medical history of Allergic rhinitis, cause unspecified; Benign neoplasm of colon; Blood transfusion; CAD (coronary artery disease); CHF (congestive heart failure) ([address] Diabetes mellitus ([address] Hyperlipidemia;  Hypertension; Meralgia paresthetica of left side; Mitral valve disorders(424.0); Occlusion and stenosis of carotid artery without mention of cerebral infarction; Osteoarthrosis, unspecified whether generalized or localized, unspecified site; Osteoporosis, unspecified; Peptic ulcer, unspecified site, unspecified as acute or chronic, without mention of hemorrhage, perforation, or obstruction; Personal history of malignant neoplasm of breast; Type II or unspecified type diabetes mellitus with renal manifestations, not stated as uncontrolled(250.40); and Unspecified asthma(493.90). Surgical History:   has a past surgical history that includes Cholecystectomy; Appendectomy; Varicose vein surgery; Ovary removal; Knee arthroscopy; Finger trigger release; A-V cardiac pacemaker insertion; Cardiac defibrillator placement; Colonoscopy; Colonoscopy (6/3/11); partial hysterectomy (cervix not removed); Upper gastrointestinal endoscopy (4/23/2012); Endoscopy, colon, diagnostic; pacemaker placement; Upper gastrointestinal endoscopy (5/2/12); Upper gastrointestinal endoscopy (6/7/12); and Upper gastrointestinal endoscopy (7/13/12). Social History:   reports that she quit smoking about 12 years ago. Her smoking use included Cigarettes. She has a 50.00 pack-year smoking history. She has never used smokeless tobacco. She reports that she does not drink alcohol or use drugs. Family History:   Family History   Problem Relation Age of Onset    Heart Disease Mother     Heart Disease Father     Cancer Brother        Home Medications:  Prior to Admission medications    Medication Sig Start Date End Date Taking?  Authorizing Provider   HUMALOG KWIKPEN 100 UNIT/ML pen INJECT 25 UNITS INTO THE  SKIN 3 TIMES DAILY 11/10/17  Yes Pastor Fuad MD   furosemide (LASIX) 40 MG tablet Take 1 tablet by mouth daily Except Monday and weds and Friday take 1/2 ( 20 mg)  Patient taking differently: Take by mouth 40 mg Five days a week, 20mg Two days a week Linnea Green MD   [facility] [address] [facility] LANCETS FINE MISC TEST FOUR TIMES DAILY 3/21/16   Linnea Green MD   Insulin Pen Needle (BD PEN NEEDLE GABRIEL U/F) 32G X 4 MM MISC 1 each by Does not apply route 3 times daily. 4/28/14   Linnea Green MD        Allergies:  Ace inhibitors; Asa [aspirin]; Codeine; and Morphine     Review of Systems:   · Constitutional: there has been no unanticipated weight loss. There's been no change in energy level, sleep pattern, or activity level. · Eyes: No visual changes or diplopia. No scleral icterus. · ENT: No Headaches, hearing loss or vertigo. No mouth sores or sore throat. · Cardiovascular: Reviewed in HPI  · Respiratory: No cough or wheezing, no sputum production. No hematemesis. · Gastrointestinal: No abdominal pain, appetite loss, blood in stools. No change in bowel or bladder habits. · Genitourinary: No dysuria, trouble voiding, or hematuria. · Musculoskeletal:  No gait disturbance, weakness or joint complaints. · Integumentary: No rash or pruritis. · Neurological: No headache, diplopia, change in muscle strength, numbness or tingling. No change in gait, balance, coordination, mood, affect, memory, mentation, behavior. · Psychiatric: No anxiety, no depression. · Endocrine: No malaise, fatigue or temperature intolerance. No excessive thirst, fluid intake, or urination. No tremor. · Hematologic/Lymphatic: No abnormal bruising or bleeding, blood clots or swollen lymph nodes. · Allergic/Immunologic: No nasal congestion or hives. Physical Examination:      Vitals:    11/17/17 0934   BP: (!) 104/40   Pulse: 72   Weight: 186 lb (84.4 kg)   Height: 4' 11\" (1.499 m)     Body mass index is 37.57 kg/m².      Wt Readings from Last 3 Encounters:   11/17/17 186 lb (84.4 kg)   11/06/17 185 lb (83.9 kg)   07/18/17 185 lb (83.9 kg)     BP Readings from Last 3 Encounters:   11/17/17 (!) 104/40   11/06/17 128/60   07/18/17 (!) 128/54        Constitutional and 07/11/2017    CREATININE 1.7 04/17/2017     BNP:   Lab Results   Component Value Date    PROBNP 525 04/17/2017    PROBNP 547 01/10/2017    PROBNP 505 09/26/2016       Assessment:  Congestive heart failure, unspecified congestive heart failure chronicity, unspecified congestive heart failure type (HCC)--stable. Last ef was normal and she is nyha class 1 now. Will get next echo in a year 2018. Bi-v ICD--implanted 2005. Battery replaced 2012 in [place]. Stable and followed in device clinic  Cardiomyopathy--Left ventricular function is borderline with ejection fraction estimated at 50 %. There is mild-moderate tricuspid regurgitation with RVSP estimated at 43 mmHg. Essential HTN-/40 stable    Plan:   1. Continue same medications. 2. Eat a 1/2 of a banana a day to maintain potassium at a normal level.  (instead of a whole banana to decrease potassium)  3. Dr. Judah Hare will follow up kidney function with labs in 2 months.    4. Follow up in 6 months      I appreciate the opportunity of cooperating in the care of this individual.    Cathy Mcleod M.D., [facility]

## 2017-11-17 NOTE — PATIENT INSTRUCTIONS
1. Continue same medications. 2. Eat a 1/2 of a banana a day to maintain potassium at a normal level. .   3. Dr. Radha Branch will follow up kidney function with labs in 2 months.    4. Follow up in 6 months

## 2017-12-01 ENCOUNTER — OFFICE VISIT (OUTPATIENT)
Dept: DERMATOLOGY | Age: 82
End: 2017-12-01

## 2017-12-01 DIAGNOSIS — Z87.891 FORMER SMOKER: ICD-10-CM

## 2017-12-01 DIAGNOSIS — D22.70 MULTIPLE BENIGN MELANOCYTIC NEVI OF UPPER AND LOWER EXTREMITIES AND TRUNK: ICD-10-CM

## 2017-12-01 DIAGNOSIS — Z85.828 HISTORY OF NONMELANOMA SKIN CANCER: ICD-10-CM

## 2017-12-01 DIAGNOSIS — L57.8 PHOTOAGING OF SKIN: ICD-10-CM

## 2017-12-01 DIAGNOSIS — L57.0 ACTINIC KERATOSES: Primary | ICD-10-CM

## 2017-12-01 DIAGNOSIS — D22.5 MULTIPLE BENIGN MELANOCYTIC NEVI OF UPPER AND LOWER EXTREMITIES AND TRUNK: ICD-10-CM

## 2017-12-01 DIAGNOSIS — D22.60 MULTIPLE BENIGN MELANOCYTIC NEVI OF UPPER AND LOWER EXTREMITIES AND TRUNK: ICD-10-CM

## 2017-12-01 DIAGNOSIS — L82.1 SEBORRHEIC KERATOSES: ICD-10-CM

## 2017-12-01 PROCEDURE — 17003 DESTRUCT PREMALG LES 2-14: CPT | Performed by: DERMATOLOGY

## 2017-12-01 PROCEDURE — 17000 DESTRUCT PREMALG LESION: CPT | Performed by: DERMATOLOGY

## 2017-12-01 PROCEDURE — 99203 OFFICE O/P NEW LOW 30 MIN: CPT | Performed by: DERMATOLOGY

## 2017-12-01 NOTE — PROGRESS NOTES
heart failure) ([facility] [address]     Diabetes mellitus ([facility] [address]     Hyperlipidemia     Hypertension     Meralgia paresthetica of left side 4/28/2014    Mitral valve disorders(424.0)     Occlusion and stenosis of carotid artery without mention of cerebral infarction     Osteoarthrosis, unspecified whether generalized or localized, unspecified site     Osteoporosis, unspecified     Peptic ulcer, unspecified site, unspecified as acute or chronic, without mention of hemorrhage, perforation, or obstruction     Personal history of malignant neoplasm of breast     Type II or unspecified type diabetes mellitus with renal manifestations, not stated as uncontrolled(250.40) 8/26/2014    Unspecified asthma(493.90)      Past Surgical History:   Procedure Laterality Date    A-V CARDIAC PACEMAKER INSERTION      APPENDECTOMY      CARDIAC DEFIBRILLATOR PLACEMENT      CHOLECYSTECTOMY      COLONOSCOPY      COLONOSCOPY  6/3/11    polypectomies x 2    ENDOSCOPY, COLON, DIAGNOSTIC      FINGER TRIGGER RELEASE      KNEE ARTHROSCOPY      OVARY REMOVAL      PACEMAKER PLACEMENT      PARTIAL HYSTERECTOMY      UPPER GASTROINTESTINAL ENDOSCOPY  4/23/2012    with biopsies    UPPER GASTROINTESTINAL ENDOSCOPY  5/2/12    UPPER GASTROINTESTINAL ENDOSCOPY  6/7/12    UPPER GASTROINTESTINAL ENDOSCOPY  7/13/12    AND LARYNGOSCOPY WITH TONGUE AND VALLECULA BIOPSY    VARICOSE VEIN SURGERY         Allergies   Allergen Reactions    Ace Inhibitors      cough    Asa [Aspirin]      GI bleeding    Codeine Other (See Comments)    Morphine Other (See Comments)     Makes me goofy I climb the walls     Outpatient Prescriptions Marked as Taking for the 12/1/17 encounter (Office Visit) with Thanh Sanches, DO   Medication Sig Dispense Refill    HUMALOG KWIKPEN 100 UNIT/ML pen INJECT 25 UNITS INTO THE  SKIN 3 TIMES DAILY 15 Pen 3    furosemide (LASIX) 40 MG tablet Take 1 tablet by mouth daily Except Monday and weds and Friday take 1/2 ( 20 mg) (Patient taking differently: Take by mouth 40 mg Five days a week, 20mg Two days a week) 90 tablet 2    digoxin (LANOXIN) 125 MCG tablet Take 1 tablet by mouth three times a week Monday, weds, friday 90 tablet 3    allopurinol (ZYLOPRIM) 100 MG tablet TAKE 2 TABLETS BY MOUTH  DAILY 180 tablet 0    atorvastatin (LIPITOR) 40 MG tablet TAKE 1 TABLET BY MOUTH  NIGHTLY 90 tablet 0    gabapentin (NEURONTIN) 100 MG capsule TAKE 1 AT NIGHT 90 capsule 0    carvedilol (COREG) 3.125 MG tablet TAKE 1 TABLET BY MOUTH TWO  TIMES DAILY WITH MEALS 180 tablet 3    gemfibrozil (LOPID) 600 MG tablet TAKE 1 TABLET BY MOUTH TWO  TIMES DAILY 180 tablet 0    LANTUS SOLOSTAR 100 UNIT/ML injection pen INJECT 60 UNITS INTO THE  SKIN NIGHTLY 60 mL 0    losartan (COZAAR) 50 MG tablet Take 1 tablet by mouth daily 90 tablet 2    spironolactone (ALDACTONE) 25 MG tablet Take 1 tablet by mouth daily 90 tablet 2    glucose blood VI test strips (ASCENSIA AUTODISC VI;ONE TOUCH ULTRA TEST VI) strip 1 each by In Vitro route 4 times daily 500 each 3    alendronate (FOSAMAX) 70 MG tablet Take 1 tablet by mouth  every 7 days 12 tablet 3    Blood Glucose Monitoring Suppl RAFAEL 1 Device by Does not apply route 4 times daily 1 Device 0    ONETOUCH DELICA LANCETS FINE MISC TEST FOUR TIMES DAILY 300 each 3    fluocinonide (LIDEX) 0.05 % external solution Apply topically 2 times daily. 60 mL 2    Insulin Pen Needle (BD PEN NEEDLE GABRIEL U/F) 32G X 4 MM MISC 1 each by Does not apply route 3 times daily. 400 each 3    Vitamin D (CHOLECALCIFEROL) 1000 UNITS CAPS capsule Take 1,000 Units by mouth daily.  FERROUS SULFATE Take 325 mg by mouth daily Pt. Unsure of dose, dr julia navas ordered it      Garlic 693 MG TABS Take 1 tablet by mouth daily       Multiple Vitamins-Minerals (CENTRUM SILVER PO) Take 1 tablet by mouth daily. Social History:   Social History     Social History    Marital status:       Spouse name: N/A    Number of

## 2017-12-01 NOTE — PATIENT INSTRUCTIONS
Cryosurgery (Freezing) Wound Care Instructions    AFTER THE PROCEDURE:    You will notice swelling and redness around the site. This is normal.    You may experience a sharp or sore feeling for the next several days. For this discomfort, you may take acetaminophen (Tylenol©).  A blister may develop at the treated area, sometimes as soon as by the end of the day. After several days, the blister will subside and a scab will form.  If the area is bumped or traumatized during the first few days following freezing, you may develop bleeding into the blister, forming a blood blister. This is nothing to be alarmed about.  If the blister is tense, uncomfortable, or much larger than the site that was frozen, you may pop the blister along its edge with a sterile needle (boiled, heated under a flame, or cleaned with alcohol) to allow the fluid to drain out. If the blister does not bother you, no treatment is needed.  Do NOT peel off the top of the blister roof. It will act as a dressing on top of your wound. WOUND CARE:    You may shower or bathe as usual, but avoid scrubbing the areas that have been frozen.  Cleanse the site twice a day with mild soapy water, and then apply a thin film of white petrolatum (Vaseline©).  You do not need to cover the area, but can if you prefer.  Do NOT allow the site to become dry or crusted, or attempt to dry it out with rubbing alcohol or hydrogen peroxide.  Continue this regimen until the area is pink and healed. Depending on the size and location of your cryosurgery site, healing may take 2 to 4 weeks.  The area may continue to be pink for several weeks, and over the next few months may become darker or lighter than the surrounding skin. This may be a permanent change. Sun Protection Tips    · Apply broad spectrum water resistant sunscreen with an SPF of at least 30 to exposed areas of the skin. Dont forget the ears and lips!  Remember to reapply sunscreen about every 2 hours and after swimming or sweating. · Wear sun protective clothing. Swim shirts (aka. rash guards) are a great idea and negates the need to reapply sunscreen in those areas. · Seek the shade whenever possible especially between the hours of 10am and 4pm when the suns rays are the strongest.     · Avoid tanning beds    Seborrheic keratosis    Educational Overview:  Seborrheic keratosis (seb-o-REE-ik care-uh-TOE-sis) is a common benign, or harmless, skin growth that affect people over the age of 27. They are not cancer and do not increase the risk of developing skin cancer. The exact cause is unknown but the tendency to develop SKs seems to be inherited. Almost all adults develop one or more seborrheic keratoses (SKs) and some people may develop many. Some growths may have a warty surface while others look like dabs of warm, brown candle wax on the skin. Seborrheic keratoses range in color from white to black; however, most are tan or brown. You can find these harmless growths anywhere on the skin, except the palms and soles. Most often, youll see them on the chest, back, head, or neck. The condition is more likely with advancing age, and the number of growths often increases over the years. Seborrheic keratoses are not contagious. Because of the benign nature of seborrheic keratoses, they can be left untreated if they are non-problematic  In cases where SKs are consistently irritated with shaving, itch or bleed excessively, enlarge, become irritated by clothing or other sources of contact, or are cosmetically undesirable, please contact your Dermatologist for evaluation and removal recommendations.      Ref: American Academy of [employer]at[employer]

## 2017-12-11 ENCOUNTER — TELEPHONE (OUTPATIENT)
Dept: FAMILY MEDICINE CLINIC | Age: 82
End: 2017-12-11

## 2017-12-11 ENCOUNTER — HOSPITAL ENCOUNTER (OUTPATIENT)
Dept: OTHER | Age: 82
Discharge: HOME | End: 2017-12-11
Attending: FAMILY MEDICINE | Admitting: FAMILY MEDICINE

## 2017-12-11 DIAGNOSIS — Z79.4 TYPE 2 DIABETES MELLITUS WITH STAGE 4 CHRONIC KIDNEY DISEASE, WITH LONG-TERM CURRENT USE OF INSULIN (HCC): ICD-10-CM

## 2017-12-11 DIAGNOSIS — E11.22 TYPE 2 DIABETES MELLITUS WITH STAGE 4 CHRONIC KIDNEY DISEASE, WITH LONG-TERM CURRENT USE OF INSULIN (HCC): ICD-10-CM

## 2017-12-11 DIAGNOSIS — N18.4 ANEMIA IN STAGE 4 CHRONIC KIDNEY DISEASE (HCC): ICD-10-CM

## 2017-12-11 DIAGNOSIS — D63.1 ANEMIA IN STAGE 4 CHRONIC KIDNEY DISEASE (HCC): ICD-10-CM

## 2017-12-11 DIAGNOSIS — E87.5 HYPERKALEMIA: Primary | ICD-10-CM

## 2017-12-11 DIAGNOSIS — N18.4 TYPE 2 DIABETES MELLITUS WITH STAGE 4 CHRONIC KIDNEY DISEASE, WITH LONG-TERM CURRENT USE OF INSULIN (HCC): ICD-10-CM

## 2017-12-11 LAB
ANION GAP SERPL CALCULATED.3IONS-SCNC: 17 MMOL/L (ref 3–16)
BASOPHILS ABSOLUTE: 0.1 K/UL (ref 0–0.2)
BASOPHILS RELATIVE PERCENT: 0.8 %
BUN BLDV-MCNC: 73 MG/DL (ref 7–20)
CALCIUM SERPL-MCNC: 9.3 MG/DL (ref 8.3–10.6)
CHLORIDE BLD-SCNC: 101 MMOL/L (ref 99–110)
CO2: 22 MMOL/L (ref 21–32)
CREAT SERPL-MCNC: 2.4 MG/DL (ref 0.6–1.2)
EOSINOPHILS ABSOLUTE: 0.5 K/UL (ref 0–0.6)
EOSINOPHILS RELATIVE PERCENT: 4.7 %
FERRITIN: 426.7 NG/ML (ref 15–150)
GFR AFRICAN AMERICAN: 23
GFR NON-AFRICAN AMERICAN: 19
GLUCOSE BLD-MCNC: 216 MG/DL (ref 70–99)
HCT VFR BLD CALC: 29.8 % (ref 36–48)
HEMOGLOBIN: 9.6 G/DL (ref 12–16)
IMMATURE RETIC FRACT: 0.53 (ref 0.21–0.37)
LYMPHOCYTES ABSOLUTE: 2.3 K/UL (ref 1–5.1)
LYMPHOCYTES RELATIVE PERCENT: 23.7 %
MCH RBC QN AUTO: 32 PG (ref 26–34)
MCHC RBC AUTO-ENTMCNC: 32.2 G/DL (ref 31–36)
MCV RBC AUTO: 99.5 FL (ref 80–100)
MONOCYTES ABSOLUTE: 0.9 K/UL (ref 0–1.3)
MONOCYTES RELATIVE PERCENT: 9.4 %
NEUTROPHILS ABSOLUTE: 6 K/UL (ref 1.7–7.7)
NEUTROPHILS RELATIVE PERCENT: 61.4 %
PDW BLD-RTO: 15.9 % (ref 12.4–15.4)
PLATELET # BLD: 213 K/UL (ref 135–450)
PMV BLD AUTO: 10.8 FL (ref 5–10.5)
POTASSIUM SERPL-SCNC: 6.6 MMOL/L (ref 3.5–5.1)
POTASSIUM SERPL-SCNC: 6.9 MMOL/L (ref 3.5–5.1)
RBC # BLD: 2.99 M/UL (ref 4–5.2)
RETICULOCYTE ABSOLUTE COUNT: 0.05 M/UL (ref 0.02–0.1)
RETICULOCYTE COUNT PCT: 1.73 % (ref 0.5–2.18)
SODIUM BLD-SCNC: 140 MMOL/L (ref 136–145)
WBC # BLD: 9.7 K/UL (ref 4–11)

## 2017-12-11 NOTE — TELEPHONE ENCOUNTER
Called patient, recommend stat repeat of potassium and stop aldactone.  May need admission if this high

## 2017-12-14 ENCOUNTER — TELEPHONE (OUTPATIENT)
Dept: PHARMACY | Facility: CLINIC | Age: 82
End: 2017-12-14

## 2017-12-14 ENCOUNTER — CARE COORDINATION (OUTPATIENT)
Dept: CASE MANAGEMENT | Age: 82
End: 2017-12-14

## 2017-12-14 RX ORDER — GEMFIBROZIL 600 MG/1
TABLET, FILM COATED ORAL
Qty: 180 TABLET | Refills: 3 | Status: SHIPPED | OUTPATIENT
Start: 2017-12-14 | End: 2018-11-02 | Stop reason: SDUPTHER

## 2017-12-14 RX ORDER — ALLOPURINOL 100 MG/1
200 TABLET ORAL DAILY
Qty: 180 TABLET | Refills: 3 | Status: SHIPPED | OUTPATIENT
Start: 2017-12-14 | End: 2018-12-05 | Stop reason: SDUPTHER

## 2017-12-14 RX ORDER — INSULIN GLARGINE 100 [IU]/ML
60 INJECTION, SOLUTION SUBCUTANEOUS NIGHTLY
Qty: 60 ML | Refills: 3 | Status: SHIPPED | OUTPATIENT
Start: 2017-12-14 | End: 2018-02-05 | Stop reason: DRUGHIGH

## 2017-12-14 RX ORDER — ATORVASTATIN CALCIUM 40 MG/1
TABLET, FILM COATED ORAL
Qty: 90 TABLET | Refills: 3 | Status: SHIPPED | OUTPATIENT
Start: 2017-12-14 | End: 2018-11-02 | Stop reason: SDUPTHER

## 2017-12-14 RX ORDER — GABAPENTIN 100 MG/1
CAPSULE ORAL
Qty: 90 CAPSULE | Refills: 3 | Status: SHIPPED | OUTPATIENT
Start: 2017-12-14 | End: 2018-02-05 | Stop reason: SDUPTHER

## 2017-12-14 NOTE — CARE COORDINATION
[facility] Transitions Initial Follow Up Call    Call within 2 business days of discharge: Yes    Patient: Zoraida Severs Patient : 3/26/1929   MRN: [id number]  Reason for Admission: acute hypercalemia  Discharge Date: 17 RARS: [facility] Risk Score: 11    Initial 24 hr call attempted, contact info left on vm       Follow Up  Future Appointments  Date Time Provider Alfredo Turner   2018 2:30 PM Dina Beckman MD [facility]   2018 9:40 AM Joanne Ta MD [facility]   2018 7:45 AM SCHEDULE, [place] PHONE TRANSMISSION  Cardio [facility]       Nadia Teixeira RN

## 2017-12-16 NOTE — CARE COORDINATION
[facility] Transitions Initial Follow Up Call    Call within 2 business days of discharge: No    Patient: Nadir Roldan Patient : 3/26/1929   MRN: [id number]  Discharge Date: 17 RARS: [facility] Risk Score: [id number]: [facility]  3rd and final attempt made to reach patient for initial post hospital follow up call. Left a voice message for patient with my contact information and informed of final outreach attempt.      Giovani Alvares RN  Care Transitions Coordinator  [phone]          Follow Up  Future Appointments  Date Time Provider Alfredo Turner   2018 2:30 PM Jean Russell MD [facility]phrol[facility]   2018 9:40 AM Adeel Kirby MD [facility]   2018 7:45 AM VESNA Alicea PHONE TRANSMISSION FF Cardio [facility]       Giovani Alvares RN

## 2018-01-03 RX ORDER — LOSARTAN POTASSIUM 25 MG/1
25 TABLET ORAL DAILY
Qty: 90 TABLET | Refills: 1 | OUTPATIENT
Start: 2018-01-03

## 2018-01-03 RX ORDER — LOSARTAN POTASSIUM 25 MG/1
25 TABLET ORAL DAILY
Qty: 90 TABLET | Refills: 1 | Status: SHIPPED | OUTPATIENT
Start: 2018-01-03 | End: 2018-03-24 | Stop reason: SDUPTHER

## 2018-01-22 ENCOUNTER — TELEPHONE (OUTPATIENT)
Dept: FAMILY MEDICINE CLINIC | Age: 83
End: 2018-01-22

## 2018-01-22 DIAGNOSIS — I10 ESSENTIAL HYPERTENSION: Primary | Chronic | ICD-10-CM

## 2018-01-22 DIAGNOSIS — N18.4 TYPE 2 DIABETES MELLITUS WITH STAGE 4 CHRONIC KIDNEY DISEASE, WITH LONG-TERM CURRENT USE OF INSULIN (HCC): ICD-10-CM

## 2018-01-22 DIAGNOSIS — E11.22 TYPE 2 DIABETES MELLITUS WITH STAGE 4 CHRONIC KIDNEY DISEASE, WITH LONG-TERM CURRENT USE OF INSULIN (HCC): ICD-10-CM

## 2018-01-22 DIAGNOSIS — Z79.4 TYPE 2 DIABETES MELLITUS WITH STAGE 4 CHRONIC KIDNEY DISEASE, WITH LONG-TERM CURRENT USE OF INSULIN (HCC): ICD-10-CM

## 2018-01-22 NOTE — TELEPHONE ENCOUNTER
Pt states she saw her kidney dr today and her BS came up in the conversation  - states it has been running between 79 & 100 in the am - states she takes 25units of Humalog 3 times a day and 60units of the Lantus at night    States he advised the Lantus should be reduced to 55units nightly       Please call and advise       Also requesting BW orders for her appt scheduled 2/5 with Dr Jennifer Jeffries      Please enter in [facility] as she will be going to [facility]    Please call when entered

## 2018-01-29 ENCOUNTER — HOSPITAL ENCOUNTER (OUTPATIENT)
Dept: OTHER | Age: 83
Discharge: HOME | End: 2018-01-29
Attending: FAMILY MEDICINE | Admitting: FAMILY MEDICINE

## 2018-01-29 ENCOUNTER — HOSPITAL ENCOUNTER (OUTPATIENT)
Dept: OTHER | Age: 83
Discharge: HOME | End: 2018-01-29
Attending: INTERNAL MEDICINE | Admitting: INTERNAL MEDICINE

## 2018-01-29 DIAGNOSIS — N18.4 CKD (CHRONIC KIDNEY DISEASE), STAGE IV (HCC): ICD-10-CM

## 2018-01-29 DIAGNOSIS — E11.22 TYPE 2 DIABETES MELLITUS WITH STAGE 4 CHRONIC KIDNEY DISEASE, WITH LONG-TERM CURRENT USE OF INSULIN (HCC): ICD-10-CM

## 2018-01-29 DIAGNOSIS — Z79.4 TYPE 2 DIABETES MELLITUS WITH STAGE 4 CHRONIC KIDNEY DISEASE, WITH LONG-TERM CURRENT USE OF INSULIN (HCC): ICD-10-CM

## 2018-01-29 DIAGNOSIS — I10 ESSENTIAL HYPERTENSION: Chronic | ICD-10-CM

## 2018-01-29 DIAGNOSIS — N18.4 TYPE 2 DIABETES MELLITUS WITH STAGE 4 CHRONIC KIDNEY DISEASE, WITH LONG-TERM CURRENT USE OF INSULIN (HCC): ICD-10-CM

## 2018-01-29 LAB
A/G RATIO: 1.4 (ref 1.1–2.2)
ALBUMIN SERPL-MCNC: 4.2 G/DL (ref 3.4–5)
ALP BLD-CCNC: 62 U/L (ref 40–129)
ALT SERPL-CCNC: 15 U/L (ref 10–40)
ANION GAP SERPL CALCULATED.3IONS-SCNC: 15 MMOL/L (ref 3–16)
AST SERPL-CCNC: 19 U/L (ref 15–37)
BACTERIA: ABNORMAL /HPF
BILIRUB SERPL-MCNC: 0.4 MG/DL (ref 0–1)
BILIRUBIN URINE: NEGATIVE
BLOOD, URINE: NEGATIVE
BUN BLDV-MCNC: 53 MG/DL (ref 7–20)
CALCIUM SERPL-MCNC: 9.6 MG/DL (ref 8.3–10.6)
CHLORIDE BLD-SCNC: 104 MMOL/L (ref 99–110)
CHOLESTEROL, TOTAL: 184 MG/DL (ref 0–199)
CLARITY: ABNORMAL
CO2: 26 MMOL/L (ref 21–32)
COLOR: YELLOW
CREAT SERPL-MCNC: 1.6 MG/DL (ref 0.6–1.2)
CREATININE URINE: 69.6 MG/DL (ref 28–259)
EPITHELIAL CELLS, UA: 1 /HPF (ref 0–5)
ESTIMATED AVERAGE GLUCOSE: 139.9 MG/DL
GFR AFRICAN AMERICAN: 37
GFR NON-AFRICAN AMERICAN: 30
GLOBULIN: 3 G/DL
GLUCOSE FASTING: 106 MG/DL (ref 70–99)
GLUCOSE URINE: NEGATIVE MG/DL
HBA1C MFR BLD: 6.5 %
HCT VFR BLD CALC: 32.4 % (ref 36–48)
HDLC SERPL-MCNC: 51 MG/DL (ref 40–60)
HEMOGLOBIN: 10.9 G/DL (ref 12–16)
HYALINE CASTS: 1 /LPF (ref 0–8)
KETONES, URINE: NEGATIVE MG/DL
LDL CHOLESTEROL CALCULATED: 113 MG/DL
LEUKOCYTE ESTERASE, URINE: ABNORMAL
MCH RBC QN AUTO: 32.5 PG (ref 26–34)
MCHC RBC AUTO-ENTMCNC: 33.8 G/DL (ref 31–36)
MCV RBC AUTO: 96.4 FL (ref 80–100)
MICROALBUMIN UR-MCNC: 75.7 MG/DL
MICROALBUMIN/CREAT UR-RTO: 1087.6 MG/G (ref 0–30)
MICROSCOPIC EXAMINATION: YES
NITRITE, URINE: POSITIVE
PDW BLD-RTO: 15 % (ref 12.4–15.4)
PH UA: 6
PHOSPHORUS: 4.2 MG/DL (ref 2.5–4.9)
PLATELET # BLD: 211 K/UL (ref 135–450)
PMV BLD AUTO: 10.6 FL (ref 5–10.5)
POTASSIUM SERPL-SCNC: 4 MMOL/L (ref 3.5–5.1)
PROTEIN UA: 100 MG/DL
RBC # BLD: 3.36 M/UL (ref 4–5.2)
RBC UA: 2 /HPF (ref 0–4)
SODIUM BLD-SCNC: 145 MMOL/L (ref 136–145)
SPECIFIC GRAVITY UA: 1.01
TOTAL PROTEIN: 7.2 G/DL (ref 6.4–8.2)
TRIGL SERPL-MCNC: 99 MG/DL (ref 0–150)
URINE TYPE: ABNORMAL
UROBILINOGEN, URINE: 0.2 E.U./DL
VLDLC SERPL CALC-MCNC: 20 MG/DL
WBC # BLD: 7.4 K/UL (ref 4–11)
WBC UA: 95 /HPF (ref 0–5)

## 2018-02-05 ENCOUNTER — OFFICE VISIT (OUTPATIENT)
Dept: FAMILY MEDICINE CLINIC | Age: 83
End: 2018-02-05

## 2018-02-05 VITALS
WEIGHT: 185.8 LBS | OXYGEN SATURATION: 98 % | DIASTOLIC BLOOD PRESSURE: 58 MMHG | BODY MASS INDEX: 37.53 KG/M2 | HEART RATE: 74 BPM | SYSTOLIC BLOOD PRESSURE: 126 MMHG

## 2018-02-05 DIAGNOSIS — I50.42 CHRONIC COMBINED SYSTOLIC AND DIASTOLIC CONGESTIVE HEART FAILURE (HCC): Chronic | ICD-10-CM

## 2018-02-05 DIAGNOSIS — J41.0 SIMPLE CHRONIC BRONCHITIS (HCC): ICD-10-CM

## 2018-02-05 DIAGNOSIS — I10 ESSENTIAL HYPERTENSION: Primary | Chronic | ICD-10-CM

## 2018-02-05 DIAGNOSIS — E11.40 TYPE 2 DIABETES MELLITUS WITH DIABETIC NEUROPATHY, WITH LONG-TERM CURRENT USE OF INSULIN (HCC): ICD-10-CM

## 2018-02-05 DIAGNOSIS — E11.22 TYPE 2 DIABETES MELLITUS WITH STAGE 3 CHRONIC KIDNEY DISEASE, WITH LONG-TERM CURRENT USE OF INSULIN (HCC): ICD-10-CM

## 2018-02-05 DIAGNOSIS — Z79.4 TYPE 2 DIABETES MELLITUS WITH STAGE 3 CHRONIC KIDNEY DISEASE, WITH LONG-TERM CURRENT USE OF INSULIN (HCC): ICD-10-CM

## 2018-02-05 DIAGNOSIS — N18.30 TYPE 2 DIABETES MELLITUS WITH STAGE 3 CHRONIC KIDNEY DISEASE, WITH LONG-TERM CURRENT USE OF INSULIN (HCC): ICD-10-CM

## 2018-02-05 DIAGNOSIS — Z79.4 TYPE 2 DIABETES MELLITUS WITH DIABETIC NEUROPATHY, WITH LONG-TERM CURRENT USE OF INSULIN (HCC): ICD-10-CM

## 2018-02-05 DIAGNOSIS — I50.22 CHRONIC SYSTOLIC HEART FAILURE (HCC): Chronic | ICD-10-CM

## 2018-02-05 PROCEDURE — G8926 SPIRO NO PERF OR DOC: HCPCS | Performed by: FAMILY MEDICINE

## 2018-02-05 PROCEDURE — G8484 FLU IMMUNIZE NO ADMIN: HCPCS | Performed by: FAMILY MEDICINE

## 2018-02-05 PROCEDURE — 99214 OFFICE O/P EST MOD 30 MIN: CPT | Performed by: FAMILY MEDICINE

## 2018-02-05 PROCEDURE — 4040F PNEUMOC VAC/ADMIN/RCVD: CPT | Performed by: FAMILY MEDICINE

## 2018-02-05 PROCEDURE — 1090F PRES/ABSN URINE INCON ASSESS: CPT | Performed by: FAMILY MEDICINE

## 2018-02-05 PROCEDURE — 3023F SPIROM DOC REV: CPT | Performed by: FAMILY MEDICINE

## 2018-02-05 PROCEDURE — G8417 CALC BMI ABV UP PARAM F/U: HCPCS | Performed by: FAMILY MEDICINE

## 2018-02-05 PROCEDURE — 1036F TOBACCO NON-USER: CPT | Performed by: FAMILY MEDICINE

## 2018-02-05 PROCEDURE — G8427 DOCREV CUR MEDS BY ELIG CLIN: HCPCS | Performed by: FAMILY MEDICINE

## 2018-02-05 PROCEDURE — 1123F ACP DISCUSS/DSCN MKR DOCD: CPT | Performed by: FAMILY MEDICINE

## 2018-02-05 RX ORDER — GABAPENTIN 100 MG/1
CAPSULE ORAL
Qty: 270 CAPSULE | Refills: 3 | Status: SHIPPED | OUTPATIENT
Start: 2018-02-05 | End: 2018-12-05 | Stop reason: SDUPTHER

## 2018-02-05 ASSESSMENT — ENCOUNTER SYMPTOMS
ABDOMINAL PAIN: 0
BLURRED VISION: 0
COUGH: 0
HEARTBURN: 0
CONSTIPATION: 0
SHORTNESS OF BREATH: 0
BACK PAIN: 0

## 2018-02-05 NOTE — PROGRESS NOTES
Chief Complaint   Patient presents with    Diabetes    Cardiomyopathy    Congestive Heart Failure    Hypertension    COPD    Asthma    Chronic Kidney Disease    Osteoporosis        Electronically signed by Jennyfer Christianson MA on 2/5/2018 at 9:44 AM       Patient is here for follow-up of the following problems:    Chief Complaint   Patient presents with    Diabetes    Cardiomyopathy    Congestive Heart Failure    Hypertension    COPD    Asthma    Chronic Kidney Disease    Osteoporosis      Was admitted for hyperkalemia   CKD is better with creat of 1.6   breathing is OK. Restricting her salt. Has CHF   has swelling legs and redness of legs   used stockings in past but they are hard to put on      Treatment Adherence:   Medication compliance:  compliant all of the time  Diet compliance:  compliant most of the time  Weight trend: stable  Current exercise: no regular exercise  Barriers: Chronic conditions    Diabetes Mellitus Type 2: Current symptoms/problems include neuropathy. Home blood sugar records: fasting range: , patient tests 4 time(s) per day  Any episodes of hypoglycemia? yes - in the morning  Eye exam current (within one year): yes  Tobacco history: She  reports that she quit smoking about 13 years ago. Her smoking use included Cigarettes. She has a 50.00 pack-year smoking history. She has never used smokeless tobacco.   Daily Aspirin? No     Hypertension:  Home blood pressure monitoring: No.  She is adherent to a low sodium diet. Patient complains of peripheral edema. Antihypertensive medication side effects: no medication side effects noted. Use of agents associated with hypertension: none. Breathing has been stable patient is limiting her salt and avoiding NSAIDs.  Her creatinine improved to 1.6 with her last blood work        Lab Results   Component Value Date    LABA1C 6.5 01/29/2018    LABA1C 6.9 10/30/2017    LABA1C 7.2 07/11/2017     Lab Results   Component Value Date

## 2018-02-05 NOTE — PATIENT INSTRUCTIONS
Reduce lantus to 50 units   get lab prior to visit in 3 months  Call if blood sugars are less then 80   Try to wear stockings on the lower legs to help with the swelling apply them in the morning and remove at night

## 2018-02-20 ENCOUNTER — NURSE ONLY (OUTPATIENT)
Dept: CARDIOLOGY CLINIC | Age: 83
End: 2018-02-20

## 2018-02-20 DIAGNOSIS — I42.9 PRIMARY CARDIOMYOPATHY (HCC): ICD-10-CM

## 2018-02-20 DIAGNOSIS — Z95.810 AUTOMATIC IMPLANTABLE CARDIOVERTER-DEFIBRILLATOR IN SITU: Chronic | ICD-10-CM

## 2018-02-20 DIAGNOSIS — I50.22 CHRONIC SYSTOLIC HEART FAILURE (HCC): ICD-10-CM

## 2018-02-20 PROCEDURE — 93297 REM INTERROG DEV EVAL ICPMS: CPT | Performed by: INTERNAL MEDICINE

## 2018-02-20 PROCEDURE — 93296 REM INTERROG EVL PM/IDS: CPT | Performed by: INTERNAL MEDICINE

## 2018-02-20 PROCEDURE — 93295 DEV INTERROG REMOTE 1/2/MLT: CPT | Performed by: INTERNAL MEDICINE

## 2018-03-16 ENCOUNTER — OFFICE VISIT (OUTPATIENT)
Dept: FAMILY MEDICINE CLINIC | Age: 83
End: 2018-03-16

## 2018-03-16 ENCOUNTER — CARE COORDINATION (OUTPATIENT)
Dept: CASE MANAGEMENT | Age: 83
End: 2018-03-16

## 2018-03-16 VITALS
HEART RATE: 70 BPM | DIASTOLIC BLOOD PRESSURE: 50 MMHG | SYSTOLIC BLOOD PRESSURE: 115 MMHG | WEIGHT: 181 LBS | OXYGEN SATURATION: 98 % | BODY MASS INDEX: 36.56 KG/M2

## 2018-03-16 DIAGNOSIS — I50.22 CHRONIC SYSTOLIC HEART FAILURE (HCC): Primary | Chronic | ICD-10-CM

## 2018-03-16 DIAGNOSIS — E11.22 TYPE 2 DIABETES MELLITUS WITH STAGE 3 CHRONIC KIDNEY DISEASE, WITH LONG-TERM CURRENT USE OF INSULIN (HCC): ICD-10-CM

## 2018-03-16 DIAGNOSIS — N18.30 TYPE 2 DIABETES MELLITUS WITH STAGE 3 CHRONIC KIDNEY DISEASE, WITH LONG-TERM CURRENT USE OF INSULIN (HCC): ICD-10-CM

## 2018-03-16 DIAGNOSIS — Z79.4 TYPE 2 DIABETES MELLITUS WITH STAGE 3 CHRONIC KIDNEY DISEASE, WITH LONG-TERM CURRENT USE OF INSULIN (HCC): ICD-10-CM

## 2018-03-16 DIAGNOSIS — J41.0 SIMPLE CHRONIC BRONCHITIS (HCC): ICD-10-CM

## 2018-03-16 DIAGNOSIS — I50.42 CHRONIC COMBINED SYSTOLIC AND DIASTOLIC CONGESTIVE HEART FAILURE (HCC): Primary | Chronic | ICD-10-CM

## 2018-03-16 DIAGNOSIS — I10 ESSENTIAL HYPERTENSION: Chronic | ICD-10-CM

## 2018-03-16 PROBLEM — E87.5 ACUTE HYPERKALEMIA: Status: RESOLVED | Noted: 2017-12-11 | Resolved: 2018-03-16

## 2018-03-16 PROCEDURE — 1111F DSCHRG MED/CURRENT MED MERGE: CPT | Performed by: FAMILY MEDICINE

## 2018-03-16 PROCEDURE — 99214 OFFICE O/P EST MOD 30 MIN: CPT | Performed by: FAMILY MEDICINE

## 2018-03-16 RX ORDER — POTASSIUM CHLORIDE 750 MG/1
10 CAPSULE, EXTENDED RELEASE ORAL DAILY
COMMUNITY
End: 2018-04-23

## 2018-03-16 ASSESSMENT — ENCOUNTER SYMPTOMS
ABDOMINAL PAIN: 0
SHORTNESS OF BREATH: 1
CONSTIPATION: 0
BACK PAIN: 1
TROUBLE SWALLOWING: 0
WHEEZING: 0
COUGH: 0

## 2018-03-16 NOTE — PROGRESS NOTES
Post-Discharge Transitional Care Management Services      Jeanine Ziegler   YOB: 1929    Date of Office Visit:  3/16/2018  Date of Hospital Admission: 12/11/17  Date of Hospital Discharge: 12/13/17  [facility] Risk Score [risk of hospital readmission >=10  medium risk (chance of readmission ~ 12%) >14  high risk (chance of readmission ~18%)]:Risk Score: 11    Care management risk score Rising risk (score 2-5) and Complex Care (Scores >=6): 9       Patient Active Problem List   Diagnosis    COPD [facility] obstructive pulmonary disease) [facility] [address]    Asthma    Essential hypertension    CHF (congestive heart failure)    Primary cardiomyopathy ([facility] [address]    Automatic implantable cardioverter-defibrillator in situ    GI bleed    Meralgia paresthetica of left side    Psoriasis    Type 2 diabetes mellitus with diabetic chronic kidney disease (HCC)    Iron deficiency anemia due to chronic blood loss    History of ductal carcinoma in situ (DCIS) of breast    Anemia in CKD (chronic kidney disease)    Systolic heart failure (HCC)    History of ductal carcinoma in situ (DCIS) of breast    Osteoporosis    History of skin cancer    Acute hyperkalemia    Type 2 diabetes mellitus with diabetic neuropathy, with long-term current use of insulin (HCC)       Allergies   Allergen Reactions    Asa [Aspirin]      GI bleeding    Morphine Other (See Comments)     Makes me goofy I climb the walls    Ace Inhibitors      cough    Codeine Other (See Comments)       Medications listed as ordered at the time of discharge from hospital   Maria Isabel Wu   Home Medication Instructions KATY:    Printed on:03/16/18 5240   Medication Information                      alendronate (FOSAMAX) 70 MG tablet  Take 1 tablet by mouth  every 7 days             allopurinol (ZYLOPRIM) 100 MG tablet  TAKE 2 TABLETS BY MOUTH  DAILY             atorvastatin (LIPITOR) 40 MG tablet  TAKE 1 TABLET BY MOUTH  NIGHTLY             Blood Glucose or weight on file to calculate BMI. Wt Readings from Last 3 Encounters:   02/05/18 185 lb 12.8 oz (84.3 kg)   01/22/18 189 lb (85.7 kg)   12/13/17 178 lb (80.7 kg)     BP Readings from Last 3 Encounters:   02/05/18 (!) 126/58   01/22/18 (!) 147/63   12/13/17 130/74        Inpatient course: Discharge summary reviewed- see chart. 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 Negative for fever. HENT: Negative for hearing loss and trouble swallowing. Eyes: Negative for visual disturbance. Respiratory: Positive for shortness of breath (overall improved). Negative for cough and wheezing. Cardiovascular: Negative for chest pain. Gastrointestinal: Negative for abdominal pain and constipation. Genitourinary: Negative for difficulty urinating. Musculoskeletal: Positive for arthralgias (knee) and back pain. Negative for joint swelling. Skin: Negative for rash. Neurological: Negative for numbness. Hematological: Does not bruise/bleed easily. Non face to face  following discharge, date last encounter closed (first attempt may have been earlier): 3/16/2018  1:49 PM 3/16/2018  1:49 PM    Call initiated 2 business days of discharge: Yes     Interval history/Current status: improved      Physical Exam   Constitutional: She is oriented to person, place, and time. She appears well-developed and well-nourished. Eyes: Pupils are equal, round, and reactive to light. Cardiovascular: Normal rate, regular rhythm, normal heart sounds and intact distal pulses.

## 2018-03-23 ENCOUNTER — TELEPHONE (OUTPATIENT)
Dept: FAMILY MEDICINE CLINIC | Age: 83
End: 2018-03-23

## 2018-03-23 ENCOUNTER — HOSPITAL ENCOUNTER (OUTPATIENT)
Dept: OTHER | Age: 83
Discharge: HOME | End: 2018-03-31
Attending: FAMILY MEDICINE | Admitting: FAMILY MEDICINE

## 2018-03-23 LAB
ANION GAP SERPL CALCULATED.3IONS-SCNC: 15 MMOL/L (ref 3–16)
BUN BLDV-MCNC: 56 MG/DL (ref 7–20)
CALCIUM SERPL-MCNC: 9.2 MG/DL (ref 8.3–10.6)
CHLORIDE BLD-SCNC: 104 MMOL/L (ref 99–110)
CO2: 26 MMOL/L (ref 21–32)
CREAT SERPL-MCNC: 1.8 MG/DL (ref 0.6–1.2)
GFR AFRICAN AMERICAN: 32
GFR NON-AFRICAN AMERICAN: 26
GLUCOSE BLD-MCNC: 152 MG/DL (ref 70–99)
POTASSIUM SERPL-SCNC: 4 MMOL/L (ref 3.5–5.1)
PRO-BNP: 1130 PG/ML (ref 0–449)
SODIUM BLD-SCNC: 145 MMOL/L (ref 136–145)

## 2018-03-23 NOTE — TELEPHONE ENCOUNTER
Toro Larry is calling to COLIN AKBAR that pt's BS was 171 this morning. She is dropping off her fasting labs off at [facility] right now.

## 2018-03-30 ENCOUNTER — CARE COORDINATION (OUTPATIENT)
Dept: CASE MANAGEMENT | Age: 83
End: 2018-03-30

## 2018-03-30 NOTE — TELEPHONE ENCOUNTER
Recommend taking an extra dose of Lasix through the weekend and let us know what her weight is on Monday

## 2018-04-01 ENCOUNTER — HOSPITAL ENCOUNTER (OUTPATIENT)
Dept: OTHER | Age: 83
Discharge: HOME | End: 2018-04-30
Attending: FAMILY MEDICINE | Admitting: FAMILY MEDICINE

## 2018-04-02 ENCOUNTER — TELEPHONE (OUTPATIENT)
Dept: FAMILY MEDICINE CLINIC | Age: 83
End: 2018-04-02

## 2018-04-04 ENCOUNTER — TELEPHONE (OUTPATIENT)
Dept: FAMILY MEDICINE CLINIC | Age: 83
End: 2018-04-04

## 2018-04-06 ENCOUNTER — TELEPHONE (OUTPATIENT)
Dept: FAMILY MEDICINE CLINIC | Age: 83
End: 2018-04-06

## 2018-04-09 ENCOUNTER — CARE COORDINATION (OUTPATIENT)
Dept: CASE MANAGEMENT | Age: 83
End: 2018-04-09

## 2018-04-16 PROBLEM — N18.4 CKD (CHRONIC KIDNEY DISEASE), STAGE IV (HCC): Status: ACTIVE | Noted: 2018-04-16

## 2018-04-18 ENCOUNTER — OFFICE VISIT (OUTPATIENT)
Dept: FAMILY MEDICINE CLINIC | Age: 83
End: 2018-04-18

## 2018-04-18 VITALS
DIASTOLIC BLOOD PRESSURE: 72 MMHG | SYSTOLIC BLOOD PRESSURE: 138 MMHG | OXYGEN SATURATION: 96 % | HEART RATE: 68 BPM | BODY MASS INDEX: 37.16 KG/M2 | WEIGHT: 184 LBS

## 2018-04-18 DIAGNOSIS — I10 ESSENTIAL HYPERTENSION: Chronic | ICD-10-CM

## 2018-04-18 DIAGNOSIS — E11.22 TYPE 2 DIABETES MELLITUS WITH STAGE 4 CHRONIC KIDNEY DISEASE, WITH LONG-TERM CURRENT USE OF INSULIN (HCC): Primary | ICD-10-CM

## 2018-04-18 DIAGNOSIS — Z79.4 TYPE 2 DIABETES MELLITUS WITH STAGE 4 CHRONIC KIDNEY DISEASE, WITH LONG-TERM CURRENT USE OF INSULIN (HCC): Primary | ICD-10-CM

## 2018-04-18 DIAGNOSIS — I50.42 CHRONIC COMBINED SYSTOLIC AND DIASTOLIC CONGESTIVE HEART FAILURE (HCC): Chronic | ICD-10-CM

## 2018-04-18 DIAGNOSIS — N18.4 TYPE 2 DIABETES MELLITUS WITH STAGE 4 CHRONIC KIDNEY DISEASE, WITH LONG-TERM CURRENT USE OF INSULIN (HCC): Primary | ICD-10-CM

## 2018-04-18 PROCEDURE — 4040F PNEUMOC VAC/ADMIN/RCVD: CPT | Performed by: FAMILY MEDICINE

## 2018-04-18 PROCEDURE — G8427 DOCREV CUR MEDS BY ELIG CLIN: HCPCS | Performed by: FAMILY MEDICINE

## 2018-04-18 PROCEDURE — G8417 CALC BMI ABV UP PARAM F/U: HCPCS | Performed by: FAMILY MEDICINE

## 2018-04-18 PROCEDURE — 1090F PRES/ABSN URINE INCON ASSESS: CPT | Performed by: FAMILY MEDICINE

## 2018-04-18 PROCEDURE — 1036F TOBACCO NON-USER: CPT | Performed by: FAMILY MEDICINE

## 2018-04-18 PROCEDURE — 1123F ACP DISCUSS/DSCN MKR DOCD: CPT | Performed by: FAMILY MEDICINE

## 2018-04-18 PROCEDURE — 99214 OFFICE O/P EST MOD 30 MIN: CPT | Performed by: FAMILY MEDICINE

## 2018-04-20 ENCOUNTER — CARE COORDINATION (OUTPATIENT)
Dept: CASE MANAGEMENT | Age: 83
End: 2018-04-20

## 2018-04-23 ENCOUNTER — OFFICE VISIT (OUTPATIENT)
Dept: CARDIOLOGY CLINIC | Age: 83
End: 2018-04-23

## 2018-04-23 ENCOUNTER — HOSPITAL ENCOUNTER (OUTPATIENT)
Dept: OTHER | Age: 83
Discharge: HOME | End: 2018-04-23
Attending: INTERNAL MEDICINE | Admitting: INTERNAL MEDICINE

## 2018-04-23 VITALS
DIASTOLIC BLOOD PRESSURE: 62 MMHG | HEIGHT: 59 IN | WEIGHT: 183.5 LBS | BODY MASS INDEX: 36.99 KG/M2 | OXYGEN SATURATION: 98 % | SYSTOLIC BLOOD PRESSURE: 124 MMHG | HEART RATE: 66 BPM

## 2018-04-23 DIAGNOSIS — Z95.810 AUTOMATIC IMPLANTABLE CARDIOVERTER-DEFIBRILLATOR IN SITU: Chronic | ICD-10-CM

## 2018-04-23 DIAGNOSIS — N18.4 CKD (CHRONIC KIDNEY DISEASE), STAGE IV (HCC): ICD-10-CM

## 2018-04-23 DIAGNOSIS — I50.22 CHRONIC SYSTOLIC HEART FAILURE (HCC): Primary | Chronic | ICD-10-CM

## 2018-04-23 DIAGNOSIS — I10 ESSENTIAL HYPERTENSION: Chronic | ICD-10-CM

## 2018-04-23 DIAGNOSIS — D50.0 IRON DEFICIENCY ANEMIA DUE TO CHRONIC BLOOD LOSS: ICD-10-CM

## 2018-04-23 DIAGNOSIS — I50.22 CHRONIC SYSTOLIC HEART FAILURE (HCC): Chronic | ICD-10-CM

## 2018-04-23 LAB
ANION GAP SERPL CALCULATED.3IONS-SCNC: 20 MMOL/L (ref 3–16)
BUN BLDV-MCNC: 59 MG/DL (ref 7–20)
CALCIUM SERPL-MCNC: 9.5 MG/DL (ref 8.3–10.6)
CHLORIDE BLD-SCNC: 100 MMOL/L (ref 99–110)
CO2: 26 MMOL/L (ref 21–32)
CREAT SERPL-MCNC: 1.8 MG/DL (ref 0.6–1.2)
GFR AFRICAN AMERICAN: 32
GFR NON-AFRICAN AMERICAN: 26
GLUCOSE BLD-MCNC: 146 MG/DL (ref 70–99)
HCT VFR BLD CALC: 32.1 % (ref 36–48)
HEMOGLOBIN: 10.7 G/DL (ref 12–16)
MCH RBC QN AUTO: 31.3 PG (ref 26–34)
MCHC RBC AUTO-ENTMCNC: 33.3 G/DL (ref 31–36)
MCV RBC AUTO: 93.9 FL (ref 80–100)
PDW BLD-RTO: 15.2 % (ref 12.4–15.4)
PLATELET # BLD: 183 K/UL (ref 135–450)
PMV BLD AUTO: 10.8 FL (ref 5–10.5)
POTASSIUM SERPL-SCNC: 4.1 MMOL/L (ref 3.5–5.1)
PRO-BNP: 949 PG/ML (ref 0–449)
RBC # BLD: 3.42 M/UL (ref 4–5.2)
SODIUM BLD-SCNC: 146 MMOL/L (ref 136–145)
WBC # BLD: 6.4 K/UL (ref 4–11)

## 2018-04-23 PROCEDURE — G8427 DOCREV CUR MEDS BY ELIG CLIN: HCPCS | Performed by: INTERNAL MEDICINE

## 2018-04-23 PROCEDURE — 4040F PNEUMOC VAC/ADMIN/RCVD: CPT | Performed by: INTERNAL MEDICINE

## 2018-04-23 PROCEDURE — G8417 CALC BMI ABV UP PARAM F/U: HCPCS | Performed by: INTERNAL MEDICINE

## 2018-04-23 PROCEDURE — 1123F ACP DISCUSS/DSCN MKR DOCD: CPT | Performed by: INTERNAL MEDICINE

## 2018-04-23 PROCEDURE — 99214 OFFICE O/P EST MOD 30 MIN: CPT | Performed by: INTERNAL MEDICINE

## 2018-04-23 PROCEDURE — 1036F TOBACCO NON-USER: CPT | Performed by: INTERNAL MEDICINE

## 2018-04-23 PROCEDURE — 1090F PRES/ABSN URINE INCON ASSESS: CPT | Performed by: INTERNAL MEDICINE

## 2018-05-15 ENCOUNTER — TELEPHONE (OUTPATIENT)
Dept: CARDIOLOGY CLINIC | Age: 83
End: 2018-05-15

## 2018-05-29 ENCOUNTER — NURSE ONLY (OUTPATIENT)
Dept: CARDIOLOGY CLINIC | Age: 83
End: 2018-05-29

## 2018-05-29 DIAGNOSIS — I50.22 CHRONIC SYSTOLIC HEART FAILURE (HCC): ICD-10-CM

## 2018-05-29 DIAGNOSIS — Z95.810 AUTOMATIC IMPLANTABLE CARDIOVERTER-DEFIBRILLATOR IN SITU: Chronic | ICD-10-CM

## 2018-05-29 DIAGNOSIS — I42.9 PRIMARY CARDIOMYOPATHY (HCC): ICD-10-CM

## 2018-05-29 LAB
ANION GAP SERPL CALCULATED.3IONS-SCNC: 19 MMOL/L (ref 6–18)
BUN BLDV-MCNC: 67 MG/DL (ref 8–26)
CALCIUM SERPL-MCNC: 9.6 MG/DL (ref 8.8–10.1)
CHLORIDE BLD-SCNC: 101 MEQ/L (ref 101–111)
CO2: 28 MEQ/L (ref 22–29)
CREAT SERPL-MCNC: 1.94 MG/DL (ref 0.44–1.03)
GFR AFRICAN AMERICAN: 25 ML/MIN/1.73 SQ METER
GFR NON-AFRICAN AMERICAN: 22 ML/MIN/1.73 SQ METER
GLUCOSE BLD-MCNC: 111 MG/DL (ref 70–99)
POTASSIUM SERPL-SCNC: 4.3 MEQ/L (ref 3.6–5.1)
SODIUM BLD-SCNC: 144 MEQ/L (ref 135–145)

## 2018-05-29 PROCEDURE — 93296 REM INTERROG EVL PM/IDS: CPT | Performed by: INTERNAL MEDICINE

## 2018-05-29 PROCEDURE — 93295 DEV INTERROG REMOTE 1/2/MLT: CPT | Performed by: INTERNAL MEDICINE

## 2018-05-29 PROCEDURE — 93297 REM INTERROG DEV EVAL ICPMS: CPT | Performed by: INTERNAL MEDICINE

## 2018-05-30 ENCOUNTER — OFFICE VISIT (OUTPATIENT)
Dept: FAMILY MEDICINE CLINIC | Age: 83
End: 2018-05-30

## 2018-05-30 VITALS
OXYGEN SATURATION: 97 % | BODY MASS INDEX: 37.77 KG/M2 | DIASTOLIC BLOOD PRESSURE: 64 MMHG | SYSTOLIC BLOOD PRESSURE: 128 MMHG | WEIGHT: 187 LBS | HEART RATE: 64 BPM

## 2018-05-30 DIAGNOSIS — N18.4 TYPE 2 DIABETES MELLITUS WITH STAGE 4 CHRONIC KIDNEY DISEASE, WITH LONG-TERM CURRENT USE OF INSULIN (HCC): ICD-10-CM

## 2018-05-30 DIAGNOSIS — E11.22 TYPE 2 DIABETES MELLITUS WITH STAGE 4 CHRONIC KIDNEY DISEASE, WITH LONG-TERM CURRENT USE OF INSULIN (HCC): ICD-10-CM

## 2018-05-30 DIAGNOSIS — M81.0 AGE-RELATED OSTEOPOROSIS WITHOUT CURRENT PATHOLOGICAL FRACTURE: ICD-10-CM

## 2018-05-30 DIAGNOSIS — I50.42 CHRONIC COMBINED SYSTOLIC AND DIASTOLIC CONGESTIVE HEART FAILURE (HCC): Primary | Chronic | ICD-10-CM

## 2018-05-30 DIAGNOSIS — Z79.4 TYPE 2 DIABETES MELLITUS WITH STAGE 4 CHRONIC KIDNEY DISEASE, WITH LONG-TERM CURRENT USE OF INSULIN (HCC): ICD-10-CM

## 2018-05-30 PROCEDURE — G8427 DOCREV CUR MEDS BY ELIG CLIN: HCPCS | Performed by: FAMILY MEDICINE

## 2018-05-30 PROCEDURE — 1123F ACP DISCUSS/DSCN MKR DOCD: CPT | Performed by: FAMILY MEDICINE

## 2018-05-30 PROCEDURE — 3288F FALL RISK ASSESSMENT DOCD: CPT | Performed by: FAMILY MEDICINE

## 2018-05-30 PROCEDURE — G8510 SCR DEP NEG, NO PLAN REQD: HCPCS | Performed by: FAMILY MEDICINE

## 2018-05-30 PROCEDURE — 4040F PNEUMOC VAC/ADMIN/RCVD: CPT | Performed by: FAMILY MEDICINE

## 2018-05-30 PROCEDURE — 1090F PRES/ABSN URINE INCON ASSESS: CPT | Performed by: FAMILY MEDICINE

## 2018-05-30 PROCEDURE — G8417 CALC BMI ABV UP PARAM F/U: HCPCS | Performed by: FAMILY MEDICINE

## 2018-05-30 PROCEDURE — 99214 OFFICE O/P EST MOD 30 MIN: CPT | Performed by: FAMILY MEDICINE

## 2018-05-30 PROCEDURE — 1036F TOBACCO NON-USER: CPT | Performed by: FAMILY MEDICINE

## 2018-05-30 RX ORDER — FUROSEMIDE 40 MG/1
TABLET ORAL
Qty: 90 TABLET | Refills: 2 | Status: SHIPPED | OUTPATIENT
Start: 2018-05-30 | End: 2018-09-17 | Stop reason: SDUPTHER

## 2018-05-30 ASSESSMENT — PATIENT HEALTH QUESTIONNAIRE - PHQ9
SUM OF ALL RESPONSES TO PHQ9 QUESTIONS 1 & 2: 0
1. LITTLE INTEREST OR PLEASURE IN DOING THINGS: 0
SUM OF ALL RESPONSES TO PHQ QUESTIONS 1-9: 0
2. FEELING DOWN, DEPRESSED OR HOPELESS: 0

## 2018-06-29 RX ORDER — CARVEDILOL 3.12 MG/1
TABLET ORAL
Qty: 180 TABLET | Refills: 2 | Status: SHIPPED | OUTPATIENT
Start: 2018-06-29 | End: 2019-01-01 | Stop reason: SDUPTHER

## 2018-07-30 ENCOUNTER — OFFICE VISIT (OUTPATIENT)
Dept: CARDIOLOGY CLINIC | Age: 83
End: 2018-07-30

## 2018-07-30 ENCOUNTER — HOSPITAL ENCOUNTER (OUTPATIENT)
Dept: OTHER | Age: 83
Discharge: HOME | End: 2018-07-30
Attending: INTERNAL MEDICINE | Admitting: INTERNAL MEDICINE

## 2018-07-30 VITALS
HEART RATE: 68 BPM | WEIGHT: 183 LBS | DIASTOLIC BLOOD PRESSURE: 46 MMHG | HEIGHT: 59 IN | SYSTOLIC BLOOD PRESSURE: 110 MMHG | BODY MASS INDEX: 36.89 KG/M2 | OXYGEN SATURATION: 97 %

## 2018-07-30 DIAGNOSIS — N18.4 CKD (CHRONIC KIDNEY DISEASE), STAGE IV (HCC): ICD-10-CM

## 2018-07-30 DIAGNOSIS — I10 ESSENTIAL HYPERTENSION: ICD-10-CM

## 2018-07-30 DIAGNOSIS — I50.22 CHRONIC SYSTOLIC HEART FAILURE (HCC): Primary | ICD-10-CM

## 2018-07-30 DIAGNOSIS — I42.9 PRIMARY CARDIOMYOPATHY (HCC): ICD-10-CM

## 2018-07-30 DIAGNOSIS — I50.22 CHRONIC SYSTOLIC HEART FAILURE (HCC): ICD-10-CM

## 2018-07-30 PROBLEM — E11.40 TYPE 2 DIABETES MELLITUS WITH DIABETIC NEUROPATHY, WITH LONG-TERM CURRENT USE OF INSULIN (HCC): Chronic | Status: ACTIVE | Noted: 2018-02-05

## 2018-07-30 PROBLEM — Z79.4 TYPE 2 DIABETES MELLITUS WITH DIABETIC NEUROPATHY, WITH LONG-TERM CURRENT USE OF INSULIN (HCC): Chronic | Status: ACTIVE | Noted: 2018-02-05

## 2018-07-30 LAB
ANION GAP SERPL CALCULATED.3IONS-SCNC: 17 MMOL/L (ref 3–16)
BASOPHILS ABSOLUTE: 0 K/UL (ref 0–0.2)
BASOPHILS RELATIVE PERCENT: 0.5 %
BUN BLDV-MCNC: 54 MG/DL (ref 7–20)
CALCIUM SERPL-MCNC: 9.7 MG/DL (ref 8.3–10.6)
CHLORIDE BLD-SCNC: 99 MMOL/L (ref 99–110)
CO2: 27 MMOL/L (ref 21–32)
CREAT SERPL-MCNC: 2 MG/DL (ref 0.6–1.2)
EOSINOPHILS ABSOLUTE: 0.2 K/UL (ref 0–0.6)
EOSINOPHILS RELATIVE PERCENT: 2.4 %
GFR AFRICAN AMERICAN: 28
GFR NON-AFRICAN AMERICAN: 23
GLUCOSE BLD-MCNC: 136 MG/DL (ref 70–99)
HCT VFR BLD CALC: 34 % (ref 36–48)
HEMOGLOBIN: 11.2 G/DL (ref 12–16)
LYMPHOCYTES ABSOLUTE: 1.7 K/UL (ref 1–5.1)
LYMPHOCYTES RELATIVE PERCENT: 19.4 %
MCH RBC QN AUTO: 31.3 PG (ref 26–34)
MCHC RBC AUTO-ENTMCNC: 33.1 G/DL (ref 31–36)
MCV RBC AUTO: 94.6 FL (ref 80–100)
MONOCYTES ABSOLUTE: 1 K/UL (ref 0–1.3)
MONOCYTES RELATIVE PERCENT: 10.8 %
NEUTROPHILS ABSOLUTE: 6 K/UL (ref 1.7–7.7)
NEUTROPHILS RELATIVE PERCENT: 66.9 %
PDW BLD-RTO: 15.3 % (ref 12.4–15.4)
PLATELET # BLD: 186 K/UL (ref 135–450)
PMV BLD AUTO: 11.3 FL (ref 5–10.5)
POTASSIUM SERPL-SCNC: 3.9 MMOL/L (ref 3.5–5.1)
PRO-BNP: 2535 PG/ML (ref 0–449)
RBC # BLD: 3.59 M/UL (ref 4–5.2)
SODIUM BLD-SCNC: 143 MMOL/L (ref 136–145)
WBC # BLD: 9 K/UL (ref 4–11)

## 2018-07-30 PROCEDURE — 1101F PT FALLS ASSESS-DOCD LE1/YR: CPT | Performed by: INTERNAL MEDICINE

## 2018-07-30 PROCEDURE — 99214 OFFICE O/P EST MOD 30 MIN: CPT | Performed by: INTERNAL MEDICINE

## 2018-07-30 PROCEDURE — 1036F TOBACCO NON-USER: CPT | Performed by: INTERNAL MEDICINE

## 2018-07-30 PROCEDURE — G8417 CALC BMI ABV UP PARAM F/U: HCPCS | Performed by: INTERNAL MEDICINE

## 2018-07-30 PROCEDURE — G8427 DOCREV CUR MEDS BY ELIG CLIN: HCPCS | Performed by: INTERNAL MEDICINE

## 2018-07-30 PROCEDURE — 1090F PRES/ABSN URINE INCON ASSESS: CPT | Performed by: INTERNAL MEDICINE

## 2018-07-30 PROCEDURE — 1123F ACP DISCUSS/DSCN MKR DOCD: CPT | Performed by: INTERNAL MEDICINE

## 2018-07-30 PROCEDURE — 4040F PNEUMOC VAC/ADMIN/RCVD: CPT | Performed by: INTERNAL MEDICINE

## 2018-07-30 NOTE — PROGRESS NOTES
Medications:  Prior to Admission medications    Medication Sig Start Date End Date Taking? Authorizing Provider   carvedilol (COREG) 3.125 MG tablet TAKE 1 TABLET BY MOUTH TWO  TIMES DAILY WITH MEALS 6/29/18  Yes Oswaldo Morales MD   furosemide (LASIX) 40 MG tablet 1.5 tabs AM, 40 mg PM 5/30/18  Yes Nishant Monroe MD   alendronate (FOSAMAX) 70 MG tablet TAKE 1 TABLET BY MOUTH  EVERY 7 DAYS 3/26/18  Yes Nishant Monroe MD   losartan (COZAAR) 25 MG tablet TAKE 1 TABLET BY MOUTH  DAILY 3/26/18  Yes Nishant Monroe MD   insulin lispro (HUMALOG KWIKPEN) 100 UNIT/ML pen Inject 25 Units into the skin 3 times daily 3/17/18  Yes Nishant Monroe MD   insulin glargine (LANTUS SOLOSTAR) 100 UNIT/ML injection pen Inject 50 Units into the skin nightly 2/5/18  Yes Nishant Monroe MD   gemfibrozil (LOPID) 600 MG tablet TAKE 1 TABLET BY MOUTH TWO  TIMES DAILY 12/14/17  Yes Nishant Monroe MD   allopurinol (ZYLOPRIM) 100 MG tablet TAKE 2 TABLETS BY MOUTH  DAILY 12/14/17  Yes Nishant Monroe MD   atorvastatin (LIPITOR) 40 MG tablet TAKE 1 TABLET BY MOUTH  NIGHTLY 12/14/17  Yes Nishant Monroe MD   digoxin [facility]) 125 MCG tablet Take 1 tablet by mouth three times a week Monday, weds, friday 10/27/17  Yes Nishant Monroe MD   glucose blood VI test strips (ASCENSIA AUTODISC VI;ONE TOUCH ULTRA TEST VI) strip 1 each by In Vitro route 4 times daily 7/26/17  Yes Nishant Monroe MD   Blood Glucose Monitoring Suppl RAFAEL 1 Device by Does not apply route 4 times daily 3/21/16  Yes Nishant Monroe MD   [facility] FINE [facility] TEST FOUR TIMES DAILY 3/21/16  Yes Nishant Monroe MD   fluocinonide (LIDEX) 0.05 % external solution Apply topically 2 times daily. 7/1/15  Yes Nishant Monroe MD   Insulin Pen Needle (BD PEN NEEDLE GABRIEL U/F) 32G X 4 MM MISC 1 each by Does not apply route 3 times daily.  4/28/14  Yes Nishant Monroe MD   FERROUS SULFATE Take 325 mg by mouth daily Pt. Unsure of dose, dr julia Galarza ordered it 4/29/12  Yes Historical Provider, MD   Garlic 261 MG TABS Take 1 tablet by mouth daily    Yes Historical Provider, MD   Multiple Vitamins-Minerals (CENTRUM SILVER PO) Take 1 tablet by mouth daily. Yes Historical Provider, MD   gabapentin (NEURONTIN) 100 MG capsule 1-3 at bedtime. Patient taking differently: Take 200 mg by mouth nightly. 1-3 at bedtime. 2/5/18 5/30/18  Lilian Del Cid MD        Allergies:  [place] [aspirin]; Morphine; Ace inhibitors; and Codeine     Review of Systems:   · Constitutional: there has been no unanticipated weight loss. There's been no change in energy level, sleep pattern, or activity level. · Eyes: No visual changes or diplopia. No scleral icterus. · ENT: No Headaches, hearing loss or vertigo. No mouth sores or sore throat. · Cardiovascular: Reviewed in HPI  · Respiratory: No cough or wheezing, no sputum production. No hematemesis. · Gastrointestinal: No abdominal pain, appetite loss, blood in stools. No change in bowel or bladder habits. · Genitourinary: No dysuria, trouble voiding, or hematuria. · Musculoskeletal:  No gait disturbance, weakness or joint complaints. · Integumentary: No rash or pruritis. · Neurological: No headache, diplopia, change in muscle strength, numbness or tingling. No change in gait, balance, coordination, mood, affect, memory, mentation, behavior. · Psychiatric: No anxiety, no depression. · Endocrine: No malaise, fatigue or temperature intolerance. No excessive thirst, fluid intake, or urination. No tremor. · Hematologic/Lymphatic: No abnormal bruising or bleeding, blood clots or swollen lymph nodes. · Allergic/Immunologic: No nasal congestion or hives.     Physical Examination:        Wt Readings from Last 3 Encounters:   07/30/18 183 lb (83 kg)   06/25/18 185 lb (83.9 kg)   05/30/18 187 lb (84.8 kg)     BP Readings from Last 3 Encounters:   07/30/18 (!) 110/46   06/25/18 (!) 133/52

## 2018-07-31 ENCOUNTER — TELEPHONE (OUTPATIENT)
Dept: CARDIOLOGY CLINIC | Age: 83
End: 2018-07-31

## 2018-07-31 DIAGNOSIS — N18.4 CKD (CHRONIC KIDNEY DISEASE), STAGE IV (HCC): ICD-10-CM

## 2018-07-31 DIAGNOSIS — I10 ESSENTIAL HYPERTENSION: Chronic | ICD-10-CM

## 2018-07-31 DIAGNOSIS — I50.42 CHRONIC COMBINED SYSTOLIC AND DIASTOLIC CONGESTIVE HEART FAILURE (HCC): Primary | Chronic | ICD-10-CM

## 2018-07-31 NOTE — TELEPHONE ENCOUNTER
Spoke with pt.   Relayed Results and instructions as directed  Lab orders placed in epic    Notes recorded by Pauletta Phoenix, MD on 7/30/2018 at 5:19 PM EDT  Call pt. Ana Paula Chung labs show that she might be holding more water. Marymady Gilson says 60 mg lasix am and 40 mg pm.  I suggest increasing to 80 mg am and 40 mg pm for a week or so and repeating bmp and bnp.  BREN

## 2018-08-09 ENCOUNTER — HOSPITAL ENCOUNTER (OUTPATIENT)
Dept: OTHER | Age: 83
Discharge: HOME | End: 2018-08-09
Attending: INTERNAL MEDICINE | Admitting: INTERNAL MEDICINE

## 2018-08-09 DIAGNOSIS — I10 ESSENTIAL HYPERTENSION: ICD-10-CM

## 2018-08-09 DIAGNOSIS — I50.42 CHRONIC COMBINED SYSTOLIC AND DIASTOLIC CONGESTIVE HEART FAILURE (HCC): Chronic | ICD-10-CM

## 2018-08-09 DIAGNOSIS — N18.4 CKD (CHRONIC KIDNEY DISEASE), STAGE IV (HCC): ICD-10-CM

## 2018-08-09 LAB
ANION GAP SERPL CALCULATED.3IONS-SCNC: 17 MMOL/L (ref 3–16)
BUN BLDV-MCNC: 55 MG/DL (ref 7–20)
CALCIUM SERPL-MCNC: 9.5 MG/DL (ref 8.3–10.6)
CHLORIDE BLD-SCNC: 97 MMOL/L (ref 99–110)
CO2: 26 MMOL/L (ref 21–32)
CREAT SERPL-MCNC: 1.8 MG/DL (ref 0.6–1.2)
GFR AFRICAN AMERICAN: 32
GFR NON-AFRICAN AMERICAN: 26
GLUCOSE BLD-MCNC: 193 MG/DL (ref 70–99)
POTASSIUM SERPL-SCNC: 4 MMOL/L (ref 3.5–5.1)
PRO-BNP: 1263 PG/ML (ref 0–449)
SODIUM BLD-SCNC: 140 MMOL/L (ref 136–145)

## 2018-08-16 RX ORDER — SPIRONOLACTONE 25 MG/1
25 TABLET ORAL DAILY
Qty: 90 TABLET | Refills: 3 | Status: SHIPPED | OUTPATIENT
Start: 2018-08-16 | End: 2019-03-12

## 2018-08-27 ENCOUNTER — HOSPITAL ENCOUNTER (OUTPATIENT)
Dept: MAMMOGRAPHY | Age: 83
Discharge: HOME | End: 2018-08-27
Attending: INTERNAL MEDICINE | Admitting: INTERNAL MEDICINE

## 2018-08-27 ENCOUNTER — HOSPITAL ENCOUNTER (OUTPATIENT)
Dept: OTHER | Age: 83
Discharge: HOME | End: 2018-08-27
Attending: FAMILY MEDICINE | Admitting: FAMILY MEDICINE

## 2018-08-27 ENCOUNTER — HOSPITAL ENCOUNTER (OUTPATIENT)
Dept: OTHER | Age: 83
Discharge: HOME | End: 2018-08-27
Attending: INTERNAL MEDICINE | Admitting: INTERNAL MEDICINE

## 2018-08-27 DIAGNOSIS — Z79.4 TYPE 2 DIABETES MELLITUS WITH DIABETIC NEUROPATHY, WITH LONG-TERM CURRENT USE OF INSULIN (HCC): ICD-10-CM

## 2018-08-27 DIAGNOSIS — E11.40 TYPE 2 DIABETES MELLITUS WITH DIABETIC NEUROPATHY, WITH LONG-TERM CURRENT USE OF INSULIN (HCC): ICD-10-CM

## 2018-08-27 DIAGNOSIS — Z12.31 ENCOUNTER FOR SCREENING MAMMOGRAM FOR BREAST CANCER: ICD-10-CM

## 2018-08-27 DIAGNOSIS — Z85.3 PERSONAL HISTORY OF BREAST CANCER: ICD-10-CM

## 2018-08-27 LAB
ANION GAP SERPL CALCULATED.3IONS-SCNC: 16 MMOL/L (ref 3–16)
BUN BLDV-MCNC: 58 MG/DL (ref 7–20)
CALCIUM SERPL-MCNC: 9.5 MG/DL (ref 8.3–10.6)
CHLORIDE BLD-SCNC: 98 MMOL/L (ref 99–110)
CO2: 28 MMOL/L (ref 21–32)
CREAT SERPL-MCNC: 1.9 MG/DL (ref 0.6–1.2)
ESTIMATED AVERAGE GLUCOSE: 197.3 MG/DL
GFR AFRICAN AMERICAN: 30
GFR NON-AFRICAN AMERICAN: 25
GLUCOSE BLD-MCNC: 183 MG/DL (ref 70–99)
HBA1C MFR BLD: 8.5 %
POTASSIUM SERPL-SCNC: 4.4 MMOL/L (ref 3.5–5.1)
SODIUM BLD-SCNC: 142 MMOL/L (ref 136–145)

## 2018-08-31 ENCOUNTER — CARE COORDINATION (OUTPATIENT)
Dept: CARE COORDINATION | Age: 83
End: 2018-08-31

## 2018-08-31 ENCOUNTER — OFFICE VISIT (OUTPATIENT)
Dept: FAMILY MEDICINE CLINIC | Age: 83
End: 2018-08-31

## 2018-08-31 VITALS
SYSTOLIC BLOOD PRESSURE: 140 MMHG | BODY MASS INDEX: 37.45 KG/M2 | DIASTOLIC BLOOD PRESSURE: 60 MMHG | OXYGEN SATURATION: 94 % | HEART RATE: 65 BPM | WEIGHT: 185.4 LBS

## 2018-08-31 DIAGNOSIS — I42.9 PRIMARY CARDIOMYOPATHY (HCC): Chronic | ICD-10-CM

## 2018-08-31 DIAGNOSIS — E11.40 TYPE 2 DIABETES MELLITUS WITH DIABETIC NEUROPATHY, WITH LONG-TERM CURRENT USE OF INSULIN (HCC): Primary | Chronic | ICD-10-CM

## 2018-08-31 DIAGNOSIS — N18.4 CKD (CHRONIC KIDNEY DISEASE), STAGE IV (HCC): ICD-10-CM

## 2018-08-31 DIAGNOSIS — Z79.4 TYPE 2 DIABETES MELLITUS WITH DIABETIC NEUROPATHY, WITH LONG-TERM CURRENT USE OF INSULIN (HCC): Primary | Chronic | ICD-10-CM

## 2018-08-31 DIAGNOSIS — Z23 NEED FOR VACCINATION: ICD-10-CM

## 2018-08-31 DIAGNOSIS — I50.22 CHRONIC SYSTOLIC HEART FAILURE (HCC): Chronic | ICD-10-CM

## 2018-08-31 PROCEDURE — 99214 OFFICE O/P EST MOD 30 MIN: CPT | Performed by: FAMILY MEDICINE

## 2018-08-31 PROCEDURE — 90662 IIV NO PRSV INCREASED AG IM: CPT | Performed by: FAMILY MEDICINE

## 2018-08-31 PROCEDURE — G0008 ADMIN INFLUENZA VIRUS VAC: HCPCS | Performed by: FAMILY MEDICINE

## 2018-08-31 PROCEDURE — 1101F PT FALLS ASSESS-DOCD LE1/YR: CPT | Performed by: FAMILY MEDICINE

## 2018-08-31 PROCEDURE — G8427 DOCREV CUR MEDS BY ELIG CLIN: HCPCS | Performed by: FAMILY MEDICINE

## 2018-08-31 PROCEDURE — 4040F PNEUMOC VAC/ADMIN/RCVD: CPT | Performed by: FAMILY MEDICINE

## 2018-08-31 PROCEDURE — 1090F PRES/ABSN URINE INCON ASSESS: CPT | Performed by: FAMILY MEDICINE

## 2018-08-31 PROCEDURE — G8417 CALC BMI ABV UP PARAM F/U: HCPCS | Performed by: FAMILY MEDICINE

## 2018-08-31 PROCEDURE — 1123F ACP DISCUSS/DSCN MKR DOCD: CPT | Performed by: FAMILY MEDICINE

## 2018-08-31 PROCEDURE — 1036F TOBACCO NON-USER: CPT | Performed by: FAMILY MEDICINE

## 2018-08-31 ASSESSMENT — ENCOUNTER SYMPTOMS
BACK PAIN: 1
ABDOMINAL PAIN: 0
CONSTIPATION: 0
WHEEZING: 0
TROUBLE SWALLOWING: 0
COUGH: 0
SHORTNESS OF BREATH: 1

## 2018-08-31 NOTE — PATIENT INSTRUCTIONS
season. But even when the vaccine doesn't exactly match these viruses, it may still provide some protection. Flu vaccine cannot prevent:  · Flu that is caused by a virus not covered by the vaccine. · Illnesses that look like flu but are not. Some people should not get this vaccine  Tell the person who is giving you the vaccine:  · If you have any severe (life-threatening) allergies. If you ever had a life-threatening allergic reaction after a dose of flu vaccine, or have a severe allergy to any part of this vaccine, you may be advised not to get vaccinated. Most, but not all, types of flu vaccine contain a small amount of egg protein. · If you ever had Guillain-Barré syndrome (also called GBS) Some people with a history of GBS should not get this vaccine. This should be discussed with your doctor. · If you are not feeling well. It is usually okay to get flu vaccine when you have a mild illness, but you might be asked to come back when you feel better. Risks of a vaccine reaction  With any medicine, including vaccines, there is a chance of reactions. These are usually mild and go away on their own, but serious reactions are also possible. Most people who get a flu shot do not have any problems with it. Minor problems following a flu shot include:  · Soreness, redness, or swelling where the shot was given  · Hoarseness  · Sore, red or itchy eyes  · Cough  · Fever  · Aches  · Headache  · Itching  · Fatigue  If these problems occur, they usually begin soon after the shot and last 1 or 2 days. More serious problems following a flu shot can include the following:  · There may be a small increased risk of Guillain-Barré Syndrome (GBS) after inactivated flu vaccine. This risk has been estimated at 1 or 2 additional cases per million people vaccinated. This is much lower than the risk of severe complications from flu, which can be prevented by flu vaccine.   · Verónica Dakin children who get the flu shot along with pneumococcal vaccine (PCV13) and/or DTaP vaccine at the same time might be slightly more likely to have a seizure caused by fever. Ask your doctor for more information. Tell your doctor if a child who is getting flu vaccine has ever had a seizure  Problems that could happen after any injected vaccine:  · People sometimes faint after a medical procedure, including vaccination. Sitting or lying down for about 15 minutes can help prevent fainting, and injuries caused by a fall. Tell your doctor if you feel dizzy, or have vision changes or ringing in the ears. · Some people get severe pain in the shoulder and have difficulty moving the arm where a shot was given. This happens very rarely. · Any medication can cause a severe allergic reaction. Such reactions from a vaccine are very rare, estimated at about 1 in a million doses, and would happen within a few minutes to a few hours after the vaccination. As with any medicine, there is a very remote chance of a vaccine causing a serious injury or death. The safety of vaccines is always being monitored. For more information, visit: www.cdc.gov/vaccinesafety/. What if there is a serious reaction? What should I look for? · Look for anything that concerns you, such as signs of a severe allergic reaction, very high fever, or unusual behavior. Signs of a severe allergic reaction can include hives, swelling of the face and throat, difficulty breathing, a fast heartbeat, dizziness, and weakness - usually within a few minutes to a few hours after the vaccination. What should I do? · If you think it is a severe allergic reaction or other emergency that can't wait, call 9-1-1 and get the person to the nearest hospital. Otherwise, call your doctor. · Reactions should be reported to the \"Vaccine Adverse Event Reporting System\" (VAERS). Your doctor should file this report, or you can do it yourself through the VAERS website at www.vaers. [facility].gov, or by calling 3-388-402-824-513-2814. [facility] does not give medical advice. The National Vaccine Injury Compensation Program  The National Vaccine Injury Compensation Program (VICP) is a federal program that was created to compensate people who may have been injured by certain vaccines. Persons who believe they may have been injured by a vaccine can learn about the program and about filing a claim by calling 0-232-375--697-895-9322 or visiting the [employer] website at www.[facility].gov/vaccinecompensation. There is a time limit to file a claim for compensation. How can I learn more? · Ask your healthcare provider. He or she can give you the vaccine package insert or suggest other sources of information. · Call your local or state health department. · Contact the Centers for Disease Control and Prevention (CDC):  ¨ Call 9-749.850.7411 (1-800-CDC-INFO) or  ¨ Visit CDC's website at www.cdc.gov/flu  Vaccine Information Statement  Inactivated Influenza Vaccine  8/7/2015)  [address] [id number]  [facility]  Centers for Disease Control and Prevention  Many Vaccine Information Statements are available in [place] and other languages. See www.immunize.org/vis. Muchas hojas de información sobre vacunas están disponibles en español y en otros idiomas. Visite www.immunize.org/vis. Care instructions adapted under license by [facility] ([facility]). If you have questions about a medical condition or this instruction, always ask your healthcare professional. [place] [address] any warranty or liability for your use of this information.      change insulin as we discussed, get lab before visit in 3 months

## 2018-08-31 NOTE — PROGRESS NOTES
LABA1C 6.5 01/29/2018    LABA1C 6.9 10/30/2017     Lab Results   Component Value Date    LABMICR YES 01/29/2018    LABMICR 75.70 (H) 01/29/2018    CREATININE 1.9 (H) 08/27/2018     Lab Results   Component Value Date    ALT 15 01/29/2018    AST 19 01/29/2018     Lab Results   Component Value Date    CHOL 184 01/29/2018    TRIG 99 01/29/2018    HDL 51 01/29/2018    LDLCALC 113 (H) 01/29/2018        Current Outpatient Prescriptions on File Prior to Visit   Medication Sig Dispense Refill    spironolactone (ALDACTONE) 25 MG tablet TAKE 1 TABLET BY MOUTH  DAILY 90 tablet 3    carvedilol (COREG) 3.125 MG tablet TAKE 1 TABLET BY MOUTH TWO  TIMES DAILY WITH MEALS 180 tablet 2    furosemide (LASIX) 40 MG tablet 1.5 tabs AM, 40 mg PM (Patient taking differently: 2 tabs (80mg) AM, 40 mg PM) 90 tablet 2    alendronate (FOSAMAX) 70 MG tablet TAKE 1 TABLET BY MOUTH  EVERY 7 DAYS 12 tablet 3    losartan (COZAAR) 25 MG tablet TAKE 1 TABLET BY MOUTH  DAILY 90 tablet 3    insulin glargine (LANTUS SOLOSTAR) 100 UNIT/ML injection pen Inject 50 Units into the skin nightly 60 mL 3    gemfibrozil (LOPID) 600 MG tablet TAKE 1 TABLET BY MOUTH TWO  TIMES DAILY 180 tablet 3    allopurinol (ZYLOPRIM) 100 MG tablet TAKE 2 TABLETS BY MOUTH  DAILY 180 tablet 3    atorvastatin (LIPITOR) 40 MG tablet TAKE 1 TABLET BY MOUTH  NIGHTLY 90 tablet 3    digoxin (LANOXIN) 125 MCG tablet Take 1 tablet by mouth three times a week Monday, weds, friday 90 tablet 3    glucose blood VI test strips (ASCENSIA AUTODISC VI;ONE TOUCH ULTRA TEST VI) strip 1 each by In Vitro route 4 times daily 500 each 3    Blood Glucose Monitoring Suppl RAFAEL 1 Device by Does not apply route 4 times daily 1 Device 0    ONETOUCH DELICA LANCETS FINE MISC TEST FOUR TIMES DAILY 300 each 3    fluocinonide (LIDEX) 0.05 % external solution Apply topically 2 times daily.  60 mL 2    Insulin Pen Needle (BD PEN NEEDLE GABRIEL U/F) 32G X 4 MM MISC 1 each by Does not apply route 3 times daily. 400 each 3    FERROUS SULFATE Take 325 mg by mouth daily Pt. Unsure of dose, dr julia navas ordered it      Garlic 027 MG TABS Take 1 tablet by mouth daily       Multiple Vitamins-Minerals (CENTRUM SILVER PO) Take 1 tablet by mouth daily.  gabapentin (NEURONTIN) 100 MG capsule 1-3 at bedtime. (Patient taking differently: Take 200 mg by mouth nightly. 1-3 at bedtime.) 270 capsule 3     No current facility-administered medications on file prior to visit. Review of Systems   Constitutional: Positive for fatigue. Negative for fever. HENT: Negative for hearing loss and trouble swallowing. Eyes: Negative for visual disturbance. Respiratory: Positive for shortness of breath (overall improved). Negative for cough and wheezing. Cardiovascular: Positive for leg swelling (Has been stable). Negative for chest pain. Gastrointestinal: Negative for abdominal pain and constipation. Genitourinary: Negative for difficulty urinating. Musculoskeletal: Positive for arthralgias (knee) and back pain. Negative for joint swelling. Skin: Negative for rash. Neurological: Negative for numbness. Hematological: Does not bruise/bleed easily. OBJECTIVE:  Physical Exam   Constitutional: She is oriented to person, place, and time. She appears well-developed and well-nourished. Eyes: Pupils are equal, round, and reactive to light. Cardiovascular: Normal rate, regular rhythm, normal heart sounds and intact distal pulses. Rate 68   Pulmonary/Chest: Effort normal and breath sounds normal.   Implantable defibrillator left upper chest   Abdominal: She exhibits no distension. There is no tenderness. Musculoskeletal: She exhibits edema (2-3+, some redness of lower extremity consistent with the chronic venous insufficiency). Neurological: She is alert and oriented to person, place, and time.  Gait (shuffles some, but overall gait is stable without any ambulatory aid) abnormal.   10 g fiber sensation intact   Psychiatric: She has a normal mood and affect. ASSESSMENT:     Diagnosis Orders   1. Type 2 diabetes mellitus with diabetic neuropathy, with long-term current use of insulin ([facility])  Hemoglobin B1E    Basic Metabolic Panel    Lipid Panel    Microalbumin / Creatinine Urine Ratio   2. Need for vaccination  INFLUENZA, HIGH DOSE, 65 YRS +, IM, PF, PREFILL SYR, 0.5ML (FLUZONE HD)   3. Chronic systolic heart failure ([facility] [address]     4. Primary cardiomyopathy ([facility] [address]     5. CKD (chronic kidney disease), stage IV ([facility])              PLAN:  A1c is higher than it's been in the past. Her blood sugars however look approximately the same with good control of her blood sugars. I suspect that she is having high postprandial blood sugars. Have asked her to add 10 units of insulin for blood sugars between the 101 40 prior to meals. Patient has chronic venous insufficiency of her lower extremities. She is coping well at home. She does have a living will. I recommend that she continue with her daily weight checks her salt restriction her close monitoring of her blood sugars. She is to repeat her blood work and follow-up. In 3 months.

## 2018-09-07 ENCOUNTER — CARE COORDINATION (OUTPATIENT)
Dept: CARE COORDINATION | Age: 83
End: 2018-09-07

## 2018-09-11 ENCOUNTER — NURSE ONLY (OUTPATIENT)
Dept: CARDIOLOGY CLINIC | Age: 83
End: 2018-09-11

## 2018-09-11 DIAGNOSIS — I42.9 PRIMARY CARDIOMYOPATHY (HCC): ICD-10-CM

## 2018-09-11 DIAGNOSIS — I50.22 CHRONIC SYSTOLIC HEART FAILURE (HCC): ICD-10-CM

## 2018-09-11 DIAGNOSIS — Z95.810 AUTOMATIC IMPLANTABLE CARDIOVERTER-DEFIBRILLATOR IN SITU: Chronic | ICD-10-CM

## 2018-09-11 PROCEDURE — 93297 REM INTERROG DEV EVAL ICPMS: CPT | Performed by: INTERNAL MEDICINE

## 2018-09-11 PROCEDURE — 93296 REM INTERROG EVL PM/IDS: CPT | Performed by: INTERNAL MEDICINE

## 2018-09-11 PROCEDURE — 93295 DEV INTERROG REMOTE 1/2/MLT: CPT | Performed by: INTERNAL MEDICINE

## 2018-09-13 ENCOUNTER — CARE COORDINATION (OUTPATIENT)
Dept: CARE COORDINATION | Age: 83
End: 2018-09-13

## 2018-11-02 RX ORDER — GEMFIBROZIL 600 MG/1
TABLET, FILM COATED ORAL
Qty: 180 TABLET | Refills: 0 | Status: SHIPPED | OUTPATIENT
Start: 2018-11-02 | End: 2018-12-05 | Stop reason: SDUPTHER

## 2018-11-02 RX ORDER — ATORVASTATIN CALCIUM 40 MG/1
TABLET, FILM COATED ORAL
Qty: 90 TABLET | Refills: 0 | Status: SHIPPED | OUTPATIENT
Start: 2018-11-02 | End: 2018-12-05 | Stop reason: SDUPTHER

## 2018-11-26 ENCOUNTER — HOSPITAL ENCOUNTER (OUTPATIENT)
Age: 83
Discharge: HOME | End: 2018-11-26
Payer: MEDICARE

## 2018-11-26 ENCOUNTER — PROCEDURE VISIT (OUTPATIENT)
Dept: CARDIOLOGY CLINIC | Age: 83
End: 2018-11-26

## 2018-11-26 ENCOUNTER — HOSPITAL ENCOUNTER (OUTPATIENT)
Dept: NON INVASIVE DIAGNOSTICS | Age: 83
Discharge: HOME | End: 2018-11-26
Payer: MEDICARE

## 2018-11-26 ENCOUNTER — OFFICE VISIT (OUTPATIENT)
Dept: CARDIOLOGY CLINIC | Age: 83
End: 2018-11-26
Payer: MEDICARE

## 2018-11-26 VITALS
SYSTOLIC BLOOD PRESSURE: 134 MMHG | BODY MASS INDEX: 38.1 KG/M2 | HEART RATE: 64 BPM | DIASTOLIC BLOOD PRESSURE: 60 MMHG | HEIGHT: 59 IN | WEIGHT: 189 LBS

## 2018-11-26 DIAGNOSIS — Z79.4 TYPE 2 DIABETES MELLITUS WITH DIABETIC NEUROPATHY, WITH LONG-TERM CURRENT USE OF INSULIN (HCC): Chronic | ICD-10-CM

## 2018-11-26 DIAGNOSIS — Z79.4 TYPE 2 DIABETES MELLITUS WITH STAGE 4 CHRONIC KIDNEY DISEASE, WITH LONG-TERM CURRENT USE OF INSULIN (HCC): ICD-10-CM

## 2018-11-26 DIAGNOSIS — N18.4 CKD (CHRONIC KIDNEY DISEASE), STAGE IV (HCC): ICD-10-CM

## 2018-11-26 DIAGNOSIS — I10 ESSENTIAL HYPERTENSION: ICD-10-CM

## 2018-11-26 DIAGNOSIS — E11.40 TYPE 2 DIABETES MELLITUS WITH DIABETIC NEUROPATHY, WITH LONG-TERM CURRENT USE OF INSULIN (HCC): Chronic | ICD-10-CM

## 2018-11-26 DIAGNOSIS — N18.4 TYPE 2 DIABETES MELLITUS WITH STAGE 4 CHRONIC KIDNEY DISEASE, WITH LONG-TERM CURRENT USE OF INSULIN (HCC): ICD-10-CM

## 2018-11-26 DIAGNOSIS — E11.22 TYPE 2 DIABETES MELLITUS WITH STAGE 4 CHRONIC KIDNEY DISEASE, WITH LONG-TERM CURRENT USE OF INSULIN (HCC): ICD-10-CM

## 2018-11-26 DIAGNOSIS — I50.22 CHRONIC SYSTOLIC HEART FAILURE (HCC): ICD-10-CM

## 2018-11-26 DIAGNOSIS — N18.4 CHRONIC KIDNEY DISEASE (CKD), ACTIVE MEDICAL MANAGEMENT WITHOUT DIALYSIS, STAGE 4 (SEVERE) (HCC): ICD-10-CM

## 2018-11-26 DIAGNOSIS — Z95.810 AUTOMATIC IMPLANTABLE CARDIOVERTER-DEFIBRILLATOR IN SITU: Chronic | ICD-10-CM

## 2018-11-26 DIAGNOSIS — I50.22 CHRONIC SYSTOLIC HEART FAILURE (HCC): Chronic | ICD-10-CM

## 2018-11-26 DIAGNOSIS — Z95.810 BIVENTRICULAR IMPLANTABLE CARDIOVERTER-DEFIBRILLATOR IN SITU: ICD-10-CM

## 2018-11-26 DIAGNOSIS — I50.22 CHRONIC SYSTOLIC CONGESTIVE HEART FAILURE (HCC): ICD-10-CM

## 2018-11-26 DIAGNOSIS — I10 ESSENTIAL HYPERTENSION: Chronic | ICD-10-CM

## 2018-11-26 DIAGNOSIS — I50.22 CHRONIC SYSTOLIC HEART FAILURE (HCC): Primary | Chronic | ICD-10-CM

## 2018-11-26 LAB
ALBUMIN SERPL-MCNC: 3.9 G/DL (ref 3.4–5)
ALP BLD-CCNC: 53 U/L (ref 40–129)
ALT SERPL-CCNC: 20 U/L (ref 10–40)
ANION GAP SERPL CALCULATED.3IONS-SCNC: 17 MMOL/L (ref 3–16)
AST SERPL-CCNC: 25 U/L (ref 15–37)
BASOPHILS ABSOLUTE: 0 K/UL (ref 0–0.2)
BASOPHILS RELATIVE PERCENT: 0.7 %
BILIRUB SERPL-MCNC: <0.2 MG/DL (ref 0–1)
BILIRUBIN DIRECT: <0.2 MG/DL (ref 0–0.3)
BILIRUBIN, INDIRECT: NORMAL MG/DL (ref 0–1)
BUN BLDV-MCNC: 72 MG/DL (ref 7–20)
CALCIUM SERPL-MCNC: 9.2 MG/DL (ref 8.3–10.6)
CHLORIDE BLD-SCNC: 105 MMOL/L (ref 99–110)
CHOLESTEROL, TOTAL: 202 MG/DL (ref 0–199)
CO2: 25 MMOL/L (ref 21–32)
CREAT SERPL-MCNC: 2.3 MG/DL (ref 0.6–1.2)
CREATININE URINE: 105.7 MG/DL (ref 28–259)
EOSINOPHILS ABSOLUTE: 0.4 K/UL (ref 0–0.6)
EOSINOPHILS RELATIVE PERCENT: 7.4 %
GFR AFRICAN AMERICAN: 24
GFR NON-AFRICAN AMERICAN: 20
GLUCOSE BLD-MCNC: 172 MG/DL (ref 70–99)
HCT VFR BLD CALC: 34.2 % (ref 36–48)
HDLC SERPL-MCNC: 39 MG/DL (ref 40–60)
HEMOGLOBIN: 11.4 G/DL (ref 12–16)
LDL CHOLESTEROL CALCULATED: 125 MG/DL
LEFT VENTRICULAR EJECTION FRACTION HIGH VALUE: 45 %
LEFT VENTRICULAR EJECTION FRACTION MODE: NORMAL
LV EF: 45 %
LVEF MODALITY: NORMAL
LYMPHOCYTES ABSOLUTE: 1.9 K/UL (ref 1–5.1)
LYMPHOCYTES RELATIVE PERCENT: 31.7 %
MCH RBC QN AUTO: 32.3 PG (ref 26–34)
MCHC RBC AUTO-ENTMCNC: 33.5 G/DL (ref 31–36)
MCV RBC AUTO: 96.5 FL (ref 80–100)
MICROALBUMIN UR-MCNC: 115.1 MG/DL
MICROALBUMIN/CREAT UR-RTO: 1088.9 MG/G (ref 0–30)
MONOCYTES ABSOLUTE: 0.7 K/UL (ref 0–1.3)
MONOCYTES RELATIVE PERCENT: 11.4 %
NEUTROPHILS ABSOLUTE: 2.9 K/UL (ref 1.7–7.7)
NEUTROPHILS RELATIVE PERCENT: 48.8 %
PDW BLD-RTO: 15.1 % (ref 12.4–15.4)
PLATELET # BLD: 166 K/UL (ref 135–450)
PMV BLD AUTO: 11.8 FL (ref 5–10.5)
POTASSIUM SERPL-SCNC: 4.4 MMOL/L (ref 3.5–5.1)
PRO-BNP: 1027 PG/ML (ref 0–449)
RBC # BLD: 3.54 M/UL (ref 4–5.2)
SODIUM BLD-SCNC: 147 MMOL/L (ref 136–145)
TOTAL PROTEIN: 7 G/DL (ref 6.4–8.2)
TRIGL SERPL-MCNC: 190 MG/DL (ref 0–150)
VLDLC SERPL CALC-MCNC: 38 MG/DL
WBC # BLD: 6 K/UL (ref 4–11)

## 2018-11-26 PROCEDURE — 80061 LIPID PANEL: CPT

## 2018-11-26 PROCEDURE — 82570 ASSAY OF URINE CREATININE: CPT

## 2018-11-26 PROCEDURE — 36415 COLL VENOUS BLD VENIPUNCTURE: CPT

## 2018-11-26 PROCEDURE — 1101F PT FALLS ASSESS-DOCD LE1/YR: CPT | Performed by: INTERNAL MEDICINE

## 2018-11-26 PROCEDURE — 83036 HEMOGLOBIN GLYCOSYLATED A1C: CPT

## 2018-11-26 PROCEDURE — 93290 INTERROG DEV EVAL ICPMS IP: CPT | Performed by: INTERNAL MEDICINE

## 2018-11-26 PROCEDURE — 85025 COMPLETE CBC W/AUTO DIFF WBC: CPT

## 2018-11-26 PROCEDURE — G8427 DOCREV CUR MEDS BY ELIG CLIN: HCPCS | Performed by: INTERNAL MEDICINE

## 2018-11-26 PROCEDURE — 1090F PRES/ABSN URINE INCON ASSESS: CPT | Performed by: INTERNAL MEDICINE

## 2018-11-26 PROCEDURE — 80076 HEPATIC FUNCTION PANEL: CPT

## 2018-11-26 PROCEDURE — G8417 CALC BMI ABV UP PARAM F/U: HCPCS | Performed by: INTERNAL MEDICINE

## 2018-11-26 PROCEDURE — 1036F TOBACCO NON-USER: CPT | Performed by: INTERNAL MEDICINE

## 2018-11-26 PROCEDURE — 80048 BASIC METABOLIC PNL TOTAL CA: CPT

## 2018-11-26 PROCEDURE — 83880 ASSAY OF NATRIURETIC PEPTIDE: CPT

## 2018-11-26 PROCEDURE — G8482 FLU IMMUNIZE ORDER/ADMIN: HCPCS | Performed by: INTERNAL MEDICINE

## 2018-11-26 PROCEDURE — 99214 OFFICE O/P EST MOD 30 MIN: CPT | Performed by: INTERNAL MEDICINE

## 2018-11-26 PROCEDURE — 1123F ACP DISCUSS/DSCN MKR DOCD: CPT | Performed by: INTERNAL MEDICINE

## 2018-11-26 PROCEDURE — 82043 UR ALBUMIN QUANTITATIVE: CPT

## 2018-11-26 PROCEDURE — 4040F PNEUMOC VAC/ADMIN/RCVD: CPT | Performed by: INTERNAL MEDICINE

## 2018-11-26 PROCEDURE — 93306 TTE W/DOPPLER COMPLETE: CPT

## 2018-11-26 NOTE — PROGRESS NOTES
CREATININE 2.0 07/30/2018     BNP:   Lab Results   Component Value Date    PROBNP 1,263 08/09/2018    PROBNP 2,535 07/30/2018    PROBNP 949 04/23/2018       Assessment:  Congestive heart failure, unspecified congestive heart failure chronicity, unspecified congestive heart failure type (HCC)--stable. Last ef was normal and she is nyha class 1 now. Will get next echo in a year 2018. Bi-v ICD--implanted 2005. Battery replaced 2012 in [place]. Stable and followed in device clinic  Cardiomyopathy--Left ventricular function is borderline with ejection fraction estimated at 50 %. There is mild-moderate tricuspid regurgitation with RVSP estimated at 43 mmHg. Essential HTN-BP stable        Plan:   Add to today's lab draw cbcd, LFTs, Bnp.   RTO in 4-6 months      I appreciate the opportunity of cooperating in the care of this individual.      Scribe's attestation: This note was scribed in the presence of Judy Guillen M.D. by Darya Montilla RN    The scribe's documentation has been prepared under my direction and personally reviewed by me in its entirety.   I confirm that the note above accurately reflects all work, treatment, procedures, and medical decision making performed by me.    Francisco J Chandler M.D., [facility] [place]

## 2018-11-27 LAB
ESTIMATED AVERAGE GLUCOSE: 220.2 MG/DL
HBA1C MFR BLD: 9.3 %

## 2018-12-03 ENCOUNTER — TELEPHONE (OUTPATIENT)
Dept: CARDIOLOGY CLINIC | Age: 83
End: 2018-12-03

## 2018-12-03 DIAGNOSIS — N18.4 CKD (CHRONIC KIDNEY DISEASE), STAGE IV (HCC): ICD-10-CM

## 2018-12-03 DIAGNOSIS — I10 ESSENTIAL HYPERTENSION: Primary | Chronic | ICD-10-CM

## 2018-12-03 DIAGNOSIS — I50.9 CONGESTIVE HEART FAILURE, UNSPECIFIED HF CHRONICITY, UNSPECIFIED HEART FAILURE TYPE (HCC): Chronic | ICD-10-CM

## 2018-12-03 DIAGNOSIS — Z95.810 AUTOMATIC IMPLANTABLE CARDIOVERTER-DEFIBRILLATOR IN SITU: Chronic | ICD-10-CM

## 2018-12-03 NOTE — TELEPHONE ENCOUNTER
Called patient and notified her of results/physician instructions below. Patient verbalized understanding and states she will have labs done in 2 weeks as she will be unable to have them done the week of Christmas. Placed orders for BMP and BNP for patient to have drawn in 2-3 weeks.

## 2018-12-05 ENCOUNTER — OFFICE VISIT (OUTPATIENT)
Dept: FAMILY MEDICINE CLINIC | Age: 83
End: 2018-12-05
Payer: MEDICARE

## 2018-12-05 VITALS
WEIGHT: 188 LBS | DIASTOLIC BLOOD PRESSURE: 40 MMHG | SYSTOLIC BLOOD PRESSURE: 90 MMHG | HEART RATE: 71 BPM | BODY MASS INDEX: 37.97 KG/M2 | OXYGEN SATURATION: 96 %

## 2018-12-05 DIAGNOSIS — E11.22 TYPE 2 DIABETES MELLITUS WITH STAGE 4 CHRONIC KIDNEY DISEASE, WITH LONG-TERM CURRENT USE OF INSULIN (HCC): ICD-10-CM

## 2018-12-05 DIAGNOSIS — Z79.4 TYPE 2 DIABETES MELLITUS WITH DIABETIC NEUROPATHY, WITH LONG-TERM CURRENT USE OF INSULIN (HCC): Primary | Chronic | ICD-10-CM

## 2018-12-05 DIAGNOSIS — Z79.4 TYPE 2 DIABETES MELLITUS WITH STAGE 4 CHRONIC KIDNEY DISEASE, WITH LONG-TERM CURRENT USE OF INSULIN (HCC): ICD-10-CM

## 2018-12-05 DIAGNOSIS — E11.40 TYPE 2 DIABETES MELLITUS WITH DIABETIC NEUROPATHY, WITH LONG-TERM CURRENT USE OF INSULIN (HCC): Primary | Chronic | ICD-10-CM

## 2018-12-05 DIAGNOSIS — N18.4 TYPE 2 DIABETES MELLITUS WITH STAGE 4 CHRONIC KIDNEY DISEASE, WITH LONG-TERM CURRENT USE OF INSULIN (HCC): ICD-10-CM

## 2018-12-05 DIAGNOSIS — I95.2 HYPOTENSION DUE TO DRUGS: ICD-10-CM

## 2018-12-05 DIAGNOSIS — I50.42 CHRONIC COMBINED SYSTOLIC AND DIASTOLIC CONGESTIVE HEART FAILURE (HCC): Chronic | ICD-10-CM

## 2018-12-05 DIAGNOSIS — Z95.810 AUTOMATIC IMPLANTABLE CARDIOVERTER-DEFIBRILLATOR IN SITU: Chronic | ICD-10-CM

## 2018-12-05 PROCEDURE — 1090F PRES/ABSN URINE INCON ASSESS: CPT | Performed by: FAMILY MEDICINE

## 2018-12-05 PROCEDURE — G8417 CALC BMI ABV UP PARAM F/U: HCPCS | Performed by: FAMILY MEDICINE

## 2018-12-05 PROCEDURE — G8427 DOCREV CUR MEDS BY ELIG CLIN: HCPCS | Performed by: FAMILY MEDICINE

## 2018-12-05 PROCEDURE — G8482 FLU IMMUNIZE ORDER/ADMIN: HCPCS | Performed by: FAMILY MEDICINE

## 2018-12-05 PROCEDURE — 4040F PNEUMOC VAC/ADMIN/RCVD: CPT | Performed by: FAMILY MEDICINE

## 2018-12-05 PROCEDURE — 1101F PT FALLS ASSESS-DOCD LE1/YR: CPT | Performed by: FAMILY MEDICINE

## 2018-12-05 PROCEDURE — 1123F ACP DISCUSS/DSCN MKR DOCD: CPT | Performed by: FAMILY MEDICINE

## 2018-12-05 PROCEDURE — 99214 OFFICE O/P EST MOD 30 MIN: CPT | Performed by: FAMILY MEDICINE

## 2018-12-05 PROCEDURE — 1036F TOBACCO NON-USER: CPT | Performed by: FAMILY MEDICINE

## 2018-12-05 RX ORDER — GEMFIBROZIL 600 MG/1
TABLET, FILM COATED ORAL
Qty: 180 TABLET | Refills: 3 | Status: SHIPPED | OUTPATIENT
Start: 2018-12-05 | End: 2019-01-01 | Stop reason: SDUPTHER

## 2018-12-05 RX ORDER — ATORVASTATIN CALCIUM 40 MG/1
TABLET, FILM COATED ORAL
Qty: 90 TABLET | Refills: 3 | Status: SHIPPED | OUTPATIENT
Start: 2018-12-05 | End: 2019-01-01 | Stop reason: SDUPTHER

## 2018-12-05 RX ORDER — DIGOXIN 125 MCG
125 TABLET ORAL
Qty: 90 TABLET | Refills: 3 | Status: SHIPPED | OUTPATIENT
Start: 2018-12-05 | End: 2020-01-01 | Stop reason: SDUPTHER

## 2018-12-05 RX ORDER — GABAPENTIN 100 MG/1
200 CAPSULE ORAL NIGHTLY
Qty: 180 CAPSULE | Refills: 1 | Status: SHIPPED | OUTPATIENT
Start: 2018-12-05 | End: 2019-03-12

## 2018-12-05 RX ORDER — LOSARTAN POTASSIUM 25 MG/1
25 TABLET ORAL NIGHTLY
Qty: 90 TABLET | Refills: 3 | Status: SHIPPED | OUTPATIENT
Start: 2018-12-05 | End: 2019-01-01 | Stop reason: SDUPTHER

## 2018-12-05 RX ORDER — ALLOPURINOL 100 MG/1
200 TABLET ORAL DAILY
Qty: 180 TABLET | Refills: 3 | Status: SHIPPED | OUTPATIENT
Start: 2018-12-05 | End: 2020-01-01 | Stop reason: SDUPTHER

## 2018-12-05 ASSESSMENT — ENCOUNTER SYMPTOMS
COUGH: 0
WHEEZING: 0
ABDOMINAL PAIN: 0
CONSTIPATION: 0
SHORTNESS OF BREATH: 1
TROUBLE SWALLOWING: 0
BACK PAIN: 1

## 2018-12-05 NOTE — PROGRESS NOTES
Subjective:      Patient ID: Lizz Tierney is a 80 y.o. female. HPI     Patient is here for follow-up of her multiple health problems namely her congestive heart failure diabetes mellitus chronic kidney disease. She has an implantable defibrillator. She has not had it fire. His chronic congestive heart failure and sees Dr. Jennifer Yan. Recent BUN and creatinine were elevated and her Lasix dose was decreased. She took her blood pressure medicine just prior to coming to the office namely the Coreg and the losartan. She was noted to have a low blood pressure here in the office for which she is asymptomatic. She watches her salt intake. She weighs herself daily. Treatment Adherence:   Medication compliance:  compliant all of the time  Diet compliance:  compliant most of the time  Weight trend: stable  Current exercise: Limited walking due to age      Diabetes Mellitus Type 2: Current symptoms/problems include neuropathy. Home blood sugar records: fasting range: 140-160, patient tests 4 time(s) per day, taking 25 units of Humalog prior to each meal, taking 50 units of Lantus at night. Any episodes of hypoglycemia? no  Eye exam current (within one year): yes  Tobacco history: She  reports that she quit smoking about 13 years ago. Her smoking use included Cigarettes. She has a 50.00 pack-year smoking history. She has never used smokeless tobacco.   Daily Aspirin? Yes    Hypertension:  Home blood pressure monitoring: No.  She is adherent to a low sodium diet. Patient denies chest pain, shortness of breath and lightheadedness. Antihypertensive medication side effects: no medication side effects noted. Use of agents associated with hypertension: none. Hyperlipidemia:  No new myalgias or GI upset on atorvastatin (Lipitor) and fenofibrate (Tricor, Trilipix).        Lab Results   Component Value Date    LABA1C 9.3 11/26/2018    LABA1C 8.5 08/27/2018    LABA1C 6.5 01/29/2018     Lab Results   Component Value Date

## 2018-12-11 ENCOUNTER — NURSE ONLY (OUTPATIENT)
Dept: CARDIOLOGY CLINIC | Age: 83
End: 2018-12-11
Payer: MEDICARE

## 2018-12-11 DIAGNOSIS — I42.9 PRIMARY CARDIOMYOPATHY (HCC): ICD-10-CM

## 2018-12-11 DIAGNOSIS — I50.22 CHRONIC SYSTOLIC HEART FAILURE (HCC): ICD-10-CM

## 2018-12-11 DIAGNOSIS — Z95.810 AUTOMATIC IMPLANTABLE CARDIOVERTER-DEFIBRILLATOR IN SITU: Chronic | ICD-10-CM

## 2018-12-11 PROCEDURE — 93297 REM INTERROG DEV EVAL ICPMS: CPT | Performed by: INTERNAL MEDICINE

## 2018-12-11 PROCEDURE — 93296 REM INTERROG EVL PM/IDS: CPT | Performed by: INTERNAL MEDICINE

## 2018-12-11 PROCEDURE — 93295 DEV INTERROG REMOTE 1/2/MLT: CPT | Performed by: INTERNAL MEDICINE

## 2018-12-17 ENCOUNTER — HOSPITAL ENCOUNTER (OUTPATIENT)
Age: 83
Discharge: HOME | End: 2018-12-17
Payer: MEDICARE

## 2018-12-17 ENCOUNTER — NURSE ONLY (OUTPATIENT)
Dept: FAMILY MEDICINE CLINIC | Age: 83
End: 2018-12-17

## 2018-12-17 VITALS — DIASTOLIC BLOOD PRESSURE: 60 MMHG | SYSTOLIC BLOOD PRESSURE: 110 MMHG

## 2018-12-17 DIAGNOSIS — I50.9 CONGESTIVE HEART FAILURE, UNSPECIFIED HF CHRONICITY, UNSPECIFIED HEART FAILURE TYPE (HCC): Chronic | ICD-10-CM

## 2018-12-17 DIAGNOSIS — E11.40 TYPE 2 DIABETES MELLITUS WITH DIABETIC NEUROPATHY, WITH LONG-TERM CURRENT USE OF INSULIN (HCC): Chronic | ICD-10-CM

## 2018-12-17 DIAGNOSIS — Z79.4 TYPE 2 DIABETES MELLITUS WITH DIABETIC NEUROPATHY, WITH LONG-TERM CURRENT USE OF INSULIN (HCC): Chronic | ICD-10-CM

## 2018-12-17 DIAGNOSIS — N18.4 CKD (CHRONIC KIDNEY DISEASE), STAGE IV (HCC): ICD-10-CM

## 2018-12-17 DIAGNOSIS — I10 ESSENTIAL HYPERTENSION: Chronic | ICD-10-CM

## 2018-12-17 DIAGNOSIS — Z95.810 AUTOMATIC IMPLANTABLE CARDIOVERTER-DEFIBRILLATOR IN SITU: Chronic | ICD-10-CM

## 2018-12-17 LAB
ANION GAP SERPL CALCULATED.3IONS-SCNC: 16 MMOL/L (ref 3–16)
BUN BLDV-MCNC: 50 MG/DL (ref 7–20)
CALCIUM SERPL-MCNC: 9.2 MG/DL (ref 8.3–10.6)
CHLORIDE BLD-SCNC: 102 MMOL/L (ref 99–110)
CO2: 25 MMOL/L (ref 21–32)
CREAT SERPL-MCNC: 1.8 MG/DL (ref 0.6–1.2)
GFR AFRICAN AMERICAN: 32
GFR NON-AFRICAN AMERICAN: 26
GLUCOSE BLD-MCNC: 72 MG/DL (ref 70–99)
POTASSIUM SERPL-SCNC: 3.5 MMOL/L (ref 3.5–5.1)
PRO-BNP: 1828 PG/ML (ref 0–449)
SODIUM BLD-SCNC: 143 MMOL/L (ref 136–145)

## 2018-12-17 PROCEDURE — 36415 COLL VENOUS BLD VENIPUNCTURE: CPT

## 2018-12-17 PROCEDURE — 83880 ASSAY OF NATRIURETIC PEPTIDE: CPT

## 2018-12-17 PROCEDURE — 80048 BASIC METABOLIC PNL TOTAL CA: CPT

## 2018-12-17 PROCEDURE — 83036 HEMOGLOBIN GLYCOSYLATED A1C: CPT

## 2018-12-18 DIAGNOSIS — N18.4 TYPE 2 DIABETES MELLITUS WITH STAGE 4 CHRONIC KIDNEY DISEASE, WITH LONG-TERM CURRENT USE OF INSULIN (HCC): Primary | ICD-10-CM

## 2018-12-18 DIAGNOSIS — Z79.4 TYPE 2 DIABETES MELLITUS WITH STAGE 4 CHRONIC KIDNEY DISEASE, WITH LONG-TERM CURRENT USE OF INSULIN (HCC): Primary | ICD-10-CM

## 2018-12-18 DIAGNOSIS — E11.22 TYPE 2 DIABETES MELLITUS WITH STAGE 4 CHRONIC KIDNEY DISEASE, WITH LONG-TERM CURRENT USE OF INSULIN (HCC): Primary | ICD-10-CM

## 2018-12-18 LAB
ESTIMATED AVERAGE GLUCOSE: 203 MG/DL
HBA1C MFR BLD: 8.7 %

## 2019-01-01 ENCOUNTER — HOSPITAL ENCOUNTER (OUTPATIENT)
Dept: WOMENS IMAGING | Age: 84
Discharge: HOME | End: 2019-09-09
Payer: MEDICARE

## 2019-01-01 ENCOUNTER — HOSPITAL ENCOUNTER (OUTPATIENT)
Age: 84
Discharge: HOME | End: 2019-11-04
Payer: MEDICARE

## 2019-01-01 ENCOUNTER — NURSE ONLY (OUTPATIENT)
Dept: CARDIOLOGY CLINIC | Age: 84
End: 2019-01-01
Payer: MEDICARE

## 2019-01-01 ENCOUNTER — OFFICE VISIT (OUTPATIENT)
Dept: FAMILY MEDICINE CLINIC | Age: 84
End: 2019-01-01
Payer: MEDICARE

## 2019-01-01 ENCOUNTER — TELEPHONE (OUTPATIENT)
Dept: FAMILY MEDICINE CLINIC | Age: 84
End: 2019-01-01

## 2019-01-01 ENCOUNTER — OFFICE VISIT (OUTPATIENT)
Dept: CARDIOLOGY CLINIC | Age: 84
End: 2019-01-01
Payer: MEDICARE

## 2019-01-01 ENCOUNTER — HOSPITAL ENCOUNTER (OUTPATIENT)
Age: 84
Discharge: HOME | End: 2019-07-08
Payer: MEDICARE

## 2019-01-01 ENCOUNTER — HOSPITAL ENCOUNTER (OUTPATIENT)
Age: 84
Discharge: HOME | End: 2019-10-07
Payer: MEDICARE

## 2019-01-01 VITALS
HEART RATE: 69 BPM | DIASTOLIC BLOOD PRESSURE: 60 MMHG | WEIGHT: 179 LBS | BODY MASS INDEX: 35.55 KG/M2 | OXYGEN SATURATION: 97 % | SYSTOLIC BLOOD PRESSURE: 110 MMHG

## 2019-01-01 VITALS
SYSTOLIC BLOOD PRESSURE: 120 MMHG | DIASTOLIC BLOOD PRESSURE: 56 MMHG | HEART RATE: 66 BPM | BODY MASS INDEX: 35.34 KG/M2 | OXYGEN SATURATION: 98 % | HEIGHT: 60 IN | WEIGHT: 180 LBS

## 2019-01-01 VITALS
WEIGHT: 176 LBS | BODY MASS INDEX: 34.93 KG/M2 | OXYGEN SATURATION: 98 % | SYSTOLIC BLOOD PRESSURE: 120 MMHG | HEART RATE: 64 BPM | DIASTOLIC BLOOD PRESSURE: 80 MMHG

## 2019-01-01 DIAGNOSIS — D50.0 IRON DEFICIENCY ANEMIA DUE TO CHRONIC BLOOD LOSS: ICD-10-CM

## 2019-01-01 DIAGNOSIS — Z79.4 TYPE 2 DIABETES MELLITUS WITH DIABETIC NEUROPATHY, WITH LONG-TERM CURRENT USE OF INSULIN (HCC): Chronic | ICD-10-CM

## 2019-01-01 DIAGNOSIS — Z79.4 TYPE 2 DIABETES MELLITUS WITH STAGE 4 CHRONIC KIDNEY DISEASE, WITH LONG-TERM CURRENT USE OF INSULIN (HCC): ICD-10-CM

## 2019-01-01 DIAGNOSIS — I10 ESSENTIAL HYPERTENSION: Chronic | ICD-10-CM

## 2019-01-01 DIAGNOSIS — Z23 NEED FOR PROPHYLACTIC VACCINATION AND INOCULATION AGAINST INFLUENZA: Primary | ICD-10-CM

## 2019-01-01 DIAGNOSIS — I50.22 CHRONIC SYSTOLIC CONGESTIVE HEART FAILURE (HCC): Chronic | ICD-10-CM

## 2019-01-01 DIAGNOSIS — E78.2 MIXED HYPERLIPIDEMIA: ICD-10-CM

## 2019-01-01 DIAGNOSIS — E11.22 TYPE 2 DIABETES MELLITUS WITH STAGE 1 CHRONIC KIDNEY DISEASE, UNSPECIFIED WHETHER LONG TERM INSULIN USE (HCC): ICD-10-CM

## 2019-01-01 DIAGNOSIS — E03.9 ACQUIRED HYPOTHYROIDISM: ICD-10-CM

## 2019-01-01 DIAGNOSIS — Z13.220 SCREENING FOR LIPID DISORDERS: ICD-10-CM

## 2019-01-01 DIAGNOSIS — M81.0 AGE-RELATED OSTEOPOROSIS WITHOUT CURRENT PATHOLOGICAL FRACTURE: ICD-10-CM

## 2019-01-01 DIAGNOSIS — I50.22 CHRONIC SYSTOLIC HEART FAILURE (HCC): ICD-10-CM

## 2019-01-01 DIAGNOSIS — E11.22 TYPE 2 DIABETES MELLITUS WITH STAGE 4 CHRONIC KIDNEY DISEASE, WITH LONG-TERM CURRENT USE OF INSULIN (HCC): ICD-10-CM

## 2019-01-01 DIAGNOSIS — I10 ESSENTIAL HYPERTENSION: ICD-10-CM

## 2019-01-01 DIAGNOSIS — Z12.39 BREAST CANCER SCREENING: ICD-10-CM

## 2019-01-01 DIAGNOSIS — N18.4 CKD (CHRONIC KIDNEY DISEASE), STAGE IV (HCC): ICD-10-CM

## 2019-01-01 DIAGNOSIS — N18.1 TYPE 2 DIABETES MELLITUS WITH STAGE 1 CHRONIC KIDNEY DISEASE, UNSPECIFIED WHETHER LONG TERM INSULIN USE (HCC): ICD-10-CM

## 2019-01-01 DIAGNOSIS — Z85.3 PERSONAL HISTORY OF MALIGNANT NEOPLASM OF BREAST: ICD-10-CM

## 2019-01-01 DIAGNOSIS — J45.909 UNCOMPLICATED ASTHMA, UNSPECIFIED ASTHMA SEVERITY, UNSPECIFIED WHETHER PERSISTENT: ICD-10-CM

## 2019-01-01 DIAGNOSIS — E11.40 TYPE 2 DIABETES MELLITUS WITH DIABETIC NEUROPATHY, WITH LONG-TERM CURRENT USE OF INSULIN (HCC): Chronic | ICD-10-CM

## 2019-01-01 DIAGNOSIS — N18.4 TYPE 2 DIABETES MELLITUS WITH STAGE 4 CHRONIC KIDNEY DISEASE, WITH LONG-TERM CURRENT USE OF INSULIN (HCC): ICD-10-CM

## 2019-01-01 DIAGNOSIS — D63.1 ANEMIA IN STAGE 4 CHRONIC KIDNEY DISEASE (HCC): ICD-10-CM

## 2019-01-01 DIAGNOSIS — I42.9 PRIMARY CARDIOMYOPATHY (HCC): ICD-10-CM

## 2019-01-01 DIAGNOSIS — Z95.810 AUTOMATIC IMPLANTABLE CARDIOVERTER-DEFIBRILLATOR IN SITU: Chronic | ICD-10-CM

## 2019-01-01 DIAGNOSIS — Z95.810 AUTOMATIC IMPLANTABLE CARDIOVERTER-DEFIBRILLATOR IN SITU: ICD-10-CM

## 2019-01-01 DIAGNOSIS — N18.4 ANEMIA IN STAGE 4 CHRONIC KIDNEY DISEASE (HCC): ICD-10-CM

## 2019-01-01 DIAGNOSIS — J45.909 UNCOMPLICATED ASTHMA, UNSPECIFIED ASTHMA SEVERITY, UNSPECIFIED WHETHER PERSISTENT: Primary | ICD-10-CM

## 2019-01-01 DIAGNOSIS — I50.22 CHRONIC SYSTOLIC HEART FAILURE (HCC): Primary | ICD-10-CM

## 2019-01-01 LAB
A/G RATIO: 1.3 (ref 1.1–2.2)
A/G RATIO: 1.4 (ref 1.1–2.2)
A/G RATIO: 1.6 (ref 1.1–2.2)
ALBUMIN SERPL-MCNC: 3.9 G/DL (ref 3.4–5)
ALBUMIN SERPL-MCNC: 4 G/DL (ref 3.4–5)
ALBUMIN SERPL-MCNC: 4.5 G/DL (ref 3.4–5)
ALP BLD-CCNC: 56 U/L (ref 40–129)
ALP BLD-CCNC: 60 U/L (ref 40–129)
ALP BLD-CCNC: 61 U/L (ref 40–129)
ALT SERPL-CCNC: 11 U/L (ref 10–40)
ALT SERPL-CCNC: 12 U/L (ref 10–40)
ALT SERPL-CCNC: 14 U/L (ref 10–40)
ANION GAP SERPL CALCULATED.3IONS-SCNC: 16 MMOL/L (ref 3–16)
ANION GAP SERPL CALCULATED.3IONS-SCNC: 17 MMOL/L (ref 3–16)
ANION GAP SERPL CALCULATED.3IONS-SCNC: 21 MMOL/L (ref 3–16)
AST SERPL-CCNC: 18 U/L (ref 15–37)
AST SERPL-CCNC: 18 U/L (ref 15–37)
AST SERPL-CCNC: 19 U/L (ref 15–37)
BASOPHILS ABSOLUTE: 0 K/UL (ref 0–0.2)
BASOPHILS ABSOLUTE: 0.1 K/UL (ref 0–0.2)
BASOPHILS RELATIVE PERCENT: 0.7 %
BASOPHILS RELATIVE PERCENT: 0.8 %
BILIRUB SERPL-MCNC: 0.3 MG/DL (ref 0–1)
BILIRUB SERPL-MCNC: 0.3 MG/DL (ref 0–1)
BILIRUB SERPL-MCNC: <0.2 MG/DL (ref 0–1)
BUN BLDV-MCNC: 73 MG/DL (ref 7–20)
BUN BLDV-MCNC: 77 MG/DL (ref 7–20)
BUN BLDV-MCNC: 92 MG/DL (ref 7–20)
CALCIUM SERPL-MCNC: 9.3 MG/DL (ref 8.3–10.6)
CALCIUM SERPL-MCNC: 9.4 MG/DL (ref 8.3–10.6)
CALCIUM SERPL-MCNC: 9.5 MG/DL (ref 8.3–10.6)
CHLORIDE BLD-SCNC: 102 MMOL/L (ref 99–110)
CHLORIDE BLD-SCNC: 103 MMOL/L (ref 99–110)
CHLORIDE BLD-SCNC: 104 MMOL/L (ref 99–110)
CHOLESTEROL, FASTING: 177 MG/DL (ref 0–199)
CHOLESTEROL, FASTING: 191 MG/DL (ref 0–199)
CHOLESTEROL, TOTAL: 203 MG/DL (ref 0–199)
CO2: 23 MMOL/L (ref 21–32)
CO2: 23 MMOL/L (ref 21–32)
CO2: 25 MMOL/L (ref 21–32)
CREAT SERPL-MCNC: 2 MG/DL (ref 0.6–1.2)
CREAT SERPL-MCNC: 2.3 MG/DL (ref 0.6–1.2)
CREAT SERPL-MCNC: 2.3 MG/DL (ref 0.6–1.2)
CREATININE URINE: 58.6 MG/DL (ref 28–259)
EOSINOPHILS ABSOLUTE: 0.4 K/UL (ref 0–0.6)
EOSINOPHILS ABSOLUTE: 0.4 K/UL (ref 0–0.6)
EOSINOPHILS RELATIVE PERCENT: 5.3 %
EOSINOPHILS RELATIVE PERCENT: 5.4 %
ESTIMATED AVERAGE GLUCOSE: 148.5 MG/DL
ESTIMATED AVERAGE GLUCOSE: 157.1 MG/DL
ESTIMATED AVERAGE GLUCOSE: 159.9 MG/DL
GFR AFRICAN AMERICAN: 24
GFR AFRICAN AMERICAN: 24
GFR AFRICAN AMERICAN: 28
GFR NON-AFRICAN AMERICAN: 20
GFR NON-AFRICAN AMERICAN: 20
GFR NON-AFRICAN AMERICAN: 23
GLOBULIN: 2.9 G/DL
GLOBULIN: 2.9 G/DL
GLOBULIN: 3.1 G/DL
GLUCOSE BLD-MCNC: 97 MG/DL (ref 70–99)
GLUCOSE BLD-MCNC: 99 MG/DL (ref 70–99)
GLUCOSE FASTING: 134 MG/DL (ref 70–99)
GLUCOSE FASTING: 97 MG/DL (ref 70–99)
HBA1C MFR BLD: 6.8 %
HBA1C MFR BLD: 7.1 %
HBA1C MFR BLD: 7.2 %
HCT VFR BLD CALC: 32.3 % (ref 36–48)
HCT VFR BLD CALC: 33.5 % (ref 36–48)
HDLC SERPL-MCNC: 40 MG/DL (ref 40–60)
HDLC SERPL-MCNC: 46 MG/DL (ref 40–60)
HDLC SERPL-MCNC: 52 MG/DL (ref 40–60)
HEMOGLOBIN: 10.8 G/DL (ref 12–16)
HEMOGLOBIN: 11.3 G/DL (ref 12–16)
LDL CHOLESTEROL CALCULATED: 111 MG/DL
LDL CHOLESTEROL CALCULATED: 113 MG/DL
LDL CHOLESTEROL CALCULATED: 129 MG/DL
LYMPHOCYTES ABSOLUTE: 1.9 K/UL (ref 1–5.1)
LYMPHOCYTES ABSOLUTE: 2.1 K/UL (ref 1–5.1)
LYMPHOCYTES RELATIVE PERCENT: 28.1 %
LYMPHOCYTES RELATIVE PERCENT: 30.7 %
MCH RBC QN AUTO: 32.6 PG (ref 26–34)
MCH RBC QN AUTO: 32.7 PG (ref 26–34)
MCHC RBC AUTO-ENTMCNC: 33.6 G/DL (ref 31–36)
MCHC RBC AUTO-ENTMCNC: 33.6 G/DL (ref 31–36)
MCV RBC AUTO: 97.2 FL (ref 80–100)
MCV RBC AUTO: 97.4 FL (ref 80–100)
MONOCYTES ABSOLUTE: 0.7 K/UL (ref 0–1.3)
MONOCYTES ABSOLUTE: 0.8 K/UL (ref 0–1.3)
MONOCYTES RELATIVE PERCENT: 11.1 %
MONOCYTES RELATIVE PERCENT: 11.5 %
NEUTROPHILS ABSOLUTE: 3.5 K/UL (ref 1.7–7.7)
NEUTROPHILS ABSOLUTE: 3.7 K/UL (ref 1.7–7.7)
NEUTROPHILS RELATIVE PERCENT: 52.1 %
NEUTROPHILS RELATIVE PERCENT: 54.3 %
PDW BLD-RTO: 15.1 % (ref 12.4–15.4)
PDW BLD-RTO: 15.2 % (ref 12.4–15.4)
PLATELET # BLD: 198 K/UL (ref 135–450)
PLATELET # BLD: 199 K/UL (ref 135–450)
PMV BLD AUTO: 10.6 FL (ref 5–10.5)
PMV BLD AUTO: 10.9 FL (ref 5–10.5)
POTASSIUM SERPL-SCNC: 4 MMOL/L (ref 3.5–5.1)
POTASSIUM SERPL-SCNC: 4 MMOL/L (ref 3.5–5.1)
POTASSIUM SERPL-SCNC: 4.4 MMOL/L (ref 3.5–5.1)
PROTEIN PROTEIN: 207 MG/DL
RBC # BLD: 3.33 M/UL (ref 4–5.2)
RBC # BLD: 3.44 M/UL (ref 4–5.2)
SODIUM BLD-SCNC: 143 MMOL/L (ref 136–145)
SODIUM BLD-SCNC: 144 MMOL/L (ref 136–145)
SODIUM BLD-SCNC: 147 MMOL/L (ref 136–145)
TOTAL PROTEIN: 6.9 G/DL (ref 6.4–8.2)
TOTAL PROTEIN: 7 G/DL (ref 6.4–8.2)
TOTAL PROTEIN: 7.4 G/DL (ref 6.4–8.2)
TRIGL SERPL-MCNC: 138 MG/DL (ref 0–150)
TRIGLYCERIDE, FASTING: 130 MG/DL (ref 0–150)
TRIGLYCERIDE, FASTING: 132 MG/DL (ref 0–150)
TSH REFLEX: 3.37 UIU/ML (ref 0.27–4.2)
VLDLC SERPL CALC-MCNC: 26 MG/DL
VLDLC SERPL CALC-MCNC: 26 MG/DL
VLDLC SERPL CALC-MCNC: 28 MG/DL
WBC # BLD: 6.7 K/UL (ref 4–11)
WBC # BLD: 6.8 K/UL (ref 4–11)

## 2019-01-01 PROCEDURE — G8427 DOCREV CUR MEDS BY ELIG CLIN: HCPCS | Performed by: INTERNAL MEDICINE

## 2019-01-01 PROCEDURE — 84156 ASSAY OF PROTEIN URINE: CPT

## 2019-01-01 PROCEDURE — 85025 COMPLETE CBC W/AUTO DIFF WBC: CPT

## 2019-01-01 PROCEDURE — 1036F TOBACCO NON-USER: CPT | Performed by: INTERNAL MEDICINE

## 2019-01-01 PROCEDURE — 4040F PNEUMOC VAC/ADMIN/RCVD: CPT | Performed by: FAMILY MEDICINE

## 2019-01-01 PROCEDURE — G8484 FLU IMMUNIZE NO ADMIN: HCPCS | Performed by: INTERNAL MEDICINE

## 2019-01-01 PROCEDURE — 93297 REM INTERROG DEV EVAL ICPMS: CPT | Performed by: INTERNAL MEDICINE

## 2019-01-01 PROCEDURE — 93295 DEV INTERROG REMOTE 1/2/MLT: CPT | Performed by: INTERNAL MEDICINE

## 2019-01-01 PROCEDURE — G8417 CALC BMI ABV UP PARAM F/U: HCPCS | Performed by: FAMILY MEDICINE

## 2019-01-01 PROCEDURE — 1090F PRES/ABSN URINE INCON ASSESS: CPT | Performed by: FAMILY MEDICINE

## 2019-01-01 PROCEDURE — G0008 ADMIN INFLUENZA VIRUS VAC: HCPCS | Performed by: FAMILY MEDICINE

## 2019-01-01 PROCEDURE — 90653 IIV ADJUVANT VACCINE IM: CPT | Performed by: FAMILY MEDICINE

## 2019-01-01 PROCEDURE — 80061 LIPID PANEL: CPT

## 2019-01-01 PROCEDURE — 1036F TOBACCO NON-USER: CPT | Performed by: FAMILY MEDICINE

## 2019-01-01 PROCEDURE — G8417 CALC BMI ABV UP PARAM F/U: HCPCS | Performed by: INTERNAL MEDICINE

## 2019-01-01 PROCEDURE — 99214 OFFICE O/P EST MOD 30 MIN: CPT | Performed by: FAMILY MEDICINE

## 2019-01-01 PROCEDURE — 80053 COMPREHEN METABOLIC PANEL: CPT

## 2019-01-01 PROCEDURE — 83036 HEMOGLOBIN GLYCOSYLATED A1C: CPT

## 2019-01-01 PROCEDURE — 1123F ACP DISCUSS/DSCN MKR DOCD: CPT | Performed by: FAMILY MEDICINE

## 2019-01-01 PROCEDURE — G8427 DOCREV CUR MEDS BY ELIG CLIN: HCPCS | Performed by: FAMILY MEDICINE

## 2019-01-01 PROCEDURE — 4040F PNEUMOC VAC/ADMIN/RCVD: CPT | Performed by: INTERNAL MEDICINE

## 2019-01-01 PROCEDURE — 1090F PRES/ABSN URINE INCON ASSESS: CPT | Performed by: INTERNAL MEDICINE

## 2019-01-01 PROCEDURE — 36415 COLL VENOUS BLD VENIPUNCTURE: CPT

## 2019-01-01 PROCEDURE — 82570 ASSAY OF URINE CREATININE: CPT

## 2019-01-01 PROCEDURE — G8482 FLU IMMUNIZE ORDER/ADMIN: HCPCS | Performed by: FAMILY MEDICINE

## 2019-01-01 PROCEDURE — 99214 OFFICE O/P EST MOD 30 MIN: CPT | Performed by: INTERNAL MEDICINE

## 2019-01-01 PROCEDURE — 93296 REM INTERROG EVL PM/IDS: CPT | Performed by: INTERNAL MEDICINE

## 2019-01-01 PROCEDURE — 77063 BREAST TOMOSYNTHESIS BI: CPT

## 2019-01-01 PROCEDURE — 1123F ACP DISCUSS/DSCN MKR DOCD: CPT | Performed by: INTERNAL MEDICINE

## 2019-01-01 PROCEDURE — 84443 ASSAY THYROID STIM HORMONE: CPT

## 2019-01-01 RX ORDER — CARVEDILOL 3.12 MG/1
TABLET ORAL
Qty: 180 TABLET | Refills: 2 | Status: SHIPPED | OUTPATIENT
Start: 2019-01-01 | End: 2020-01-01

## 2019-01-01 RX ORDER — ATORVASTATIN CALCIUM 40 MG/1
TABLET, FILM COATED ORAL
Qty: 90 TABLET | Refills: 0 | Status: SHIPPED | OUTPATIENT
Start: 2019-01-01 | End: 2020-01-01

## 2019-01-01 RX ORDER — GABAPENTIN 100 MG/1
200 CAPSULE ORAL NIGHTLY
Qty: 180 CAPSULE | Refills: 1 | Status: SHIPPED | OUTPATIENT
Start: 2019-01-01 | End: 2020-01-01 | Stop reason: SDUPTHER

## 2019-01-01 RX ORDER — GEMFIBROZIL 600 MG/1
TABLET, FILM COATED ORAL
Qty: 180 TABLET | Refills: 0 | Status: SHIPPED | OUTPATIENT
Start: 2019-01-01 | End: 2020-01-01

## 2019-01-01 RX ORDER — LOSARTAN POTASSIUM 25 MG/1
25 TABLET ORAL NIGHTLY
Qty: 90 TABLET | Refills: 1 | Status: SHIPPED | OUTPATIENT
Start: 2019-01-01 | End: 2019-01-01 | Stop reason: SDUPTHER

## 2019-01-01 RX ORDER — LEVOTHYROXINE SODIUM 0.03 MG/1
25 TABLET ORAL DAILY
Qty: 90 TABLET | Refills: 1 | Status: SHIPPED | OUTPATIENT
Start: 2019-01-01 | End: 2020-01-01

## 2019-01-01 RX ORDER — LOSARTAN POTASSIUM 25 MG/1
25 TABLET ORAL NIGHTLY
Qty: 90 TABLET | Refills: 0 | Status: SHIPPED | OUTPATIENT
Start: 2019-01-01 | End: 2020-01-01

## 2019-01-01 ASSESSMENT — ENCOUNTER SYMPTOMS
SHORTNESS OF BREATH: 0
CONSTIPATION: 0
ABDOMINAL PAIN: 0
BACK PAIN: 0
DIARRHEA: 0
BACK PAIN: 0
CHEST TIGHTNESS: 0
WHEEZING: 0
COUGH: 0
DIARRHEA: 0
CONSTIPATION: 0
RHINORRHEA: 0
RHINORRHEA: 0
ABDOMINAL PAIN: 0
SHORTNESS OF BREATH: 1

## 2019-01-01 ASSESSMENT — PATIENT HEALTH QUESTIONNAIRE - PHQ9
1. LITTLE INTEREST OR PLEASURE IN DOING THINGS: 0
2. FEELING DOWN, DEPRESSED OR HOPELESS: 0
SUM OF ALL RESPONSES TO PHQ QUESTIONS 1-9: 0
SUM OF ALL RESPONSES TO PHQ QUESTIONS 1-9: 0
SUM OF ALL RESPONSES TO PHQ9 QUESTIONS 1 & 2: 0

## 2019-03-11 ENCOUNTER — HOSPITAL ENCOUNTER (OUTPATIENT)
Age: 84
Discharge: HOME | End: 2019-03-11
Payer: MEDICARE

## 2019-03-11 DIAGNOSIS — R60.0 LOCALIZED EDEMA: ICD-10-CM

## 2019-03-11 DIAGNOSIS — N18.4 CHRONIC KIDNEY DISEASE (CKD), ACTIVE MEDICAL MANAGEMENT WITHOUT DIALYSIS, STAGE 4 (SEVERE) (HCC): ICD-10-CM

## 2019-03-11 LAB
ANION GAP SERPL CALCULATED.3IONS-SCNC: 17 MMOL/L (ref 3–16)
BUN BLDV-MCNC: 48 MG/DL (ref 7–20)
CALCIUM SERPL-MCNC: 9.3 MG/DL (ref 8.3–10.6)
CHLORIDE BLD-SCNC: 105 MMOL/L (ref 99–110)
CO2: 27 MMOL/L (ref 21–32)
CREAT SERPL-MCNC: 1.9 MG/DL (ref 0.6–1.2)
CREATININE URINE: 27.3 MG/DL (ref 28–259)
GFR AFRICAN AMERICAN: 30
GFR NON-AFRICAN AMERICAN: 25
GLUCOSE BLD-MCNC: 58 MG/DL (ref 70–99)
POTASSIUM SERPL-SCNC: 4.2 MMOL/L (ref 3.5–5.1)
PROTEIN PROTEIN: 99 MG/DL
SODIUM BLD-SCNC: 149 MMOL/L (ref 136–145)
VITAMIN D 25-HYDROXY: 38.1 NG/ML

## 2019-03-11 PROCEDURE — 36415 COLL VENOUS BLD VENIPUNCTURE: CPT

## 2019-03-11 PROCEDURE — 84156 ASSAY OF PROTEIN URINE: CPT

## 2019-03-11 PROCEDURE — 82570 ASSAY OF URINE CREATININE: CPT

## 2019-03-11 PROCEDURE — 80048 BASIC METABOLIC PNL TOTAL CA: CPT

## 2019-03-11 PROCEDURE — 82306 VITAMIN D 25 HYDROXY: CPT

## 2019-03-12 ENCOUNTER — OFFICE VISIT (OUTPATIENT)
Dept: FAMILY MEDICINE CLINIC | Age: 84
End: 2019-03-12
Payer: MEDICARE

## 2019-03-12 ENCOUNTER — NURSE ONLY (OUTPATIENT)
Dept: CARDIOLOGY CLINIC | Age: 84
End: 2019-03-12
Payer: MEDICARE

## 2019-03-12 VITALS
OXYGEN SATURATION: 98 % | BODY MASS INDEX: 37.37 KG/M2 | WEIGHT: 185 LBS | HEART RATE: 73 BPM | SYSTOLIC BLOOD PRESSURE: 118 MMHG | DIASTOLIC BLOOD PRESSURE: 80 MMHG

## 2019-03-12 DIAGNOSIS — Z95.810 AUTOMATIC IMPLANTABLE CARDIOVERTER-DEFIBRILLATOR IN SITU: Chronic | ICD-10-CM

## 2019-03-12 DIAGNOSIS — I10 ESSENTIAL HYPERTENSION: Chronic | ICD-10-CM

## 2019-03-12 DIAGNOSIS — I50.22 CHRONIC SYSTOLIC HEART FAILURE (HCC): ICD-10-CM

## 2019-03-12 DIAGNOSIS — E78.2 MIXED HYPERLIPIDEMIA: ICD-10-CM

## 2019-03-12 DIAGNOSIS — Z79.4 TYPE 2 DIABETES MELLITUS WITH DIABETIC NEUROPATHY, WITH LONG-TERM CURRENT USE OF INSULIN (HCC): Primary | Chronic | ICD-10-CM

## 2019-03-12 DIAGNOSIS — I42.9 PRIMARY CARDIOMYOPATHY (HCC): ICD-10-CM

## 2019-03-12 DIAGNOSIS — I50.23 ACUTE ON CHRONIC SYSTOLIC HEART FAILURE (HCC): Chronic | ICD-10-CM

## 2019-03-12 DIAGNOSIS — E11.40 TYPE 2 DIABETES MELLITUS WITH DIABETIC NEUROPATHY, WITH LONG-TERM CURRENT USE OF INSULIN (HCC): Primary | Chronic | ICD-10-CM

## 2019-03-12 PROCEDURE — 99214 OFFICE O/P EST MOD 30 MIN: CPT | Performed by: FAMILY MEDICINE

## 2019-03-12 PROCEDURE — 93297 REM INTERROG DEV EVAL ICPMS: CPT | Performed by: INTERNAL MEDICINE

## 2019-03-12 PROCEDURE — G8482 FLU IMMUNIZE ORDER/ADMIN: HCPCS | Performed by: FAMILY MEDICINE

## 2019-03-12 PROCEDURE — 93296 REM INTERROG EVL PM/IDS: CPT | Performed by: INTERNAL MEDICINE

## 2019-03-12 PROCEDURE — 93295 DEV INTERROG REMOTE 1/2/MLT: CPT | Performed by: INTERNAL MEDICINE

## 2019-03-12 PROCEDURE — 1123F ACP DISCUSS/DSCN MKR DOCD: CPT | Performed by: FAMILY MEDICINE

## 2019-03-12 PROCEDURE — G8427 DOCREV CUR MEDS BY ELIG CLIN: HCPCS | Performed by: FAMILY MEDICINE

## 2019-03-12 PROCEDURE — 1090F PRES/ABSN URINE INCON ASSESS: CPT | Performed by: FAMILY MEDICINE

## 2019-03-12 PROCEDURE — 1036F TOBACCO NON-USER: CPT | Performed by: FAMILY MEDICINE

## 2019-03-12 PROCEDURE — 1101F PT FALLS ASSESS-DOCD LE1/YR: CPT | Performed by: FAMILY MEDICINE

## 2019-03-12 PROCEDURE — 4040F PNEUMOC VAC/ADMIN/RCVD: CPT | Performed by: FAMILY MEDICINE

## 2019-03-12 PROCEDURE — G8417 CALC BMI ABV UP PARAM F/U: HCPCS | Performed by: FAMILY MEDICINE

## 2019-03-12 RX ORDER — FUROSEMIDE 40 MG/1
80 TABLET ORAL 2 TIMES DAILY
Qty: 120 TABLET | Refills: 3 | Status: SHIPPED | OUTPATIENT
Start: 2019-03-12 | End: 2019-01-01

## 2019-03-12 ASSESSMENT — ENCOUNTER SYMPTOMS
RHINORRHEA: 0
SHORTNESS OF BREATH: 0

## 2019-04-01 RX ORDER — ALENDRONATE SODIUM 70 MG/1
TABLET ORAL
Qty: 12 TABLET | Refills: 3 | Status: SHIPPED | OUTPATIENT
Start: 2019-04-01 | End: 2020-01-01 | Stop reason: SDUPTHER

## 2019-04-02 ENCOUNTER — OFFICE VISIT (OUTPATIENT)
Dept: FAMILY MEDICINE CLINIC | Age: 84
End: 2019-04-02
Payer: MEDICARE

## 2019-04-02 VITALS
OXYGEN SATURATION: 99 % | SYSTOLIC BLOOD PRESSURE: 137 MMHG | TEMPERATURE: 98.5 F | BODY MASS INDEX: 35.55 KG/M2 | DIASTOLIC BLOOD PRESSURE: 66 MMHG | WEIGHT: 179 LBS | HEART RATE: 67 BPM

## 2019-04-02 DIAGNOSIS — J40 BRONCHITIS: Primary | ICD-10-CM

## 2019-04-02 PROCEDURE — 1123F ACP DISCUSS/DSCN MKR DOCD: CPT | Performed by: FAMILY MEDICINE

## 2019-04-02 PROCEDURE — 1090F PRES/ABSN URINE INCON ASSESS: CPT | Performed by: FAMILY MEDICINE

## 2019-04-02 PROCEDURE — 99213 OFFICE O/P EST LOW 20 MIN: CPT | Performed by: FAMILY MEDICINE

## 2019-04-02 PROCEDURE — G8428 CUR MEDS NOT DOCUMENT: HCPCS | Performed by: FAMILY MEDICINE

## 2019-04-02 PROCEDURE — G8417 CALC BMI ABV UP PARAM F/U: HCPCS | Performed by: FAMILY MEDICINE

## 2019-04-02 PROCEDURE — 1036F TOBACCO NON-USER: CPT | Performed by: FAMILY MEDICINE

## 2019-04-02 PROCEDURE — 4040F PNEUMOC VAC/ADMIN/RCVD: CPT | Performed by: FAMILY MEDICINE

## 2019-04-02 RX ORDER — BENZONATATE 200 MG/1
200 CAPSULE ORAL 3 TIMES DAILY PRN
Qty: 30 CAPSULE | Refills: 0 | Status: SHIPPED | OUTPATIENT
Start: 2019-04-02 | End: 2019-04-09

## 2019-04-02 RX ORDER — GUAIFENESIN 600 MG/1
600 TABLET, EXTENDED RELEASE ORAL 2 TIMES DAILY
Qty: 20 TABLET | Refills: 0 | Status: SHIPPED | OUTPATIENT
Start: 2019-04-02 | End: 2019-04-12

## 2019-04-02 RX ORDER — CEFDINIR 300 MG/1
300 CAPSULE ORAL 2 TIMES DAILY
Qty: 20 CAPSULE | Refills: 0 | Status: SHIPPED | OUTPATIENT
Start: 2019-04-02 | End: 2019-04-12

## 2019-04-02 ASSESSMENT — ENCOUNTER SYMPTOMS
RHINORRHEA: 1
SHORTNESS OF BREATH: 1
SORE THROAT: 1
COUGH: 1

## 2019-04-02 NOTE — PROGRESS NOTES
SUBJECTIVE:    Trinidad Pearce is a 80 y.o. female who presents for a follow up visit. Chief Complaint   Patient presents with    Cough     productive-yellow sputum x 1 week         Cough   This is a new problem. The current episode started 1 to 4 weeks ago. The problem has been gradually worsening. The problem occurs constantly. The cough is productive of sputum. Associated symptoms include nasal congestion, postnasal drip, rhinorrhea, a sore throat and shortness of breath. Pertinent negatives include no chest pain, chills, ear congestion, ear pain, fever or headaches. Nothing aggravates the symptoms. Patient's medications, allergies, past medical,surgical, social and family histories were reviewed and updated as appropriate.      Past Medical History:   Diagnosis Date    Allergic rhinitis, cause unspecified     Benign neoplasm of colon     Blood transfusion     CAD (coronary artery disease)     pacemaker/debrillator-ICD    CHF (congestive heart failure) (HCC)     Diabetes mellitus ([facility] [address]     Hyperlipidemia     Hypertension     Meralgia paresthetica of left side 4/28/2014    Mitral valve disorders(424.0)     Occlusion and stenosis of carotid artery without mention of cerebral infarction     Osteoarthrosis, unspecified whether generalized or localized, unspecified site     Osteoporosis, unspecified     Peptic ulcer, unspecified site, unspecified as acute or chronic, without mention of hemorrhage, perforation, or obstruction     Personal history of malignant neoplasm of breast     Type II or unspecified type diabetes mellitus with renal manifestations, not stated as uncontrolled(250.40) 8/26/2014    Unspecified asthma(493.90)      Past Surgical History:   Procedure Laterality Date    A-V CARDIAC PACEMAKER INSERTION      APPENDECTOMY      CARDIAC DEFIBRILLATOR PLACEMENT      CHOLECYSTECTOMY      COLONOSCOPY      COLONOSCOPY  6/3/11    polypectomies x 2    ENDOSCOPY, COLON, DIAGNOSTIC MOUTH TWO  TIMES DAILY WITH MEALS 180 tablet 2    Blood Glucose Monitoring Suppl RAFAEL 1 Device by Does not apply route 4 times daily 1 Device 0    ONETOUCH DELICA LANCETS FINE MISC TEST FOUR TIMES DAILY 300 each 3    fluocinonide (LIDEX) 0.05 % external solution Apply topically 2 times daily. 60 mL 2    Insulin Pen Needle (BD PEN NEEDLE GABRIEL U/F) 32G X 4 MM MISC 1 each by Does not apply route 3 times daily. 400 each 3    FERROUS SULFATE Take 325 mg by mouth daily Pt. Unsure of dose, dr julia navas ordered it      Garlic 690 MG TABS Take 1 tablet by mouth daily       Multiple Vitamins-Minerals (CENTRUM SILVER PO) Take 1 tablet by mouth daily. No current facility-administered medications for this visit. Review of Systems   Constitutional: Negative for chills and fever. HENT: Positive for postnasal drip, rhinorrhea and sore throat. Negative for ear pain. Respiratory: Positive for cough and shortness of breath. Cardiovascular: Negative for chest pain. Neurological: Negative for headaches. OBJECTIVE:    /66   Pulse 67   Temp 98.5 °F (36.9 °C)   Wt 179 lb (81.2 kg)   SpO2 99%   BMI 35.55 kg/m²    Physical Exam   Constitutional: She is oriented to person, place, and time. She appears well-developed and well-nourished. Obese   HENT:   Head: Normocephalic and atraumatic. Right Ear: Tympanic membrane and external ear normal.   Left Ear: Tympanic membrane and external ear normal.   Nose: Nose normal.   Mouth/Throat: Oropharynx is clear and moist.   Wears bilateral hearing aids. Eyes: Conjunctivae and EOM are normal. Right eye exhibits no discharge. Neck: Normal range of motion. Neck supple. No JVD present. No tracheal deviation present. No thyromegaly present. Cardiovascular: Normal rate, regular rhythm and normal heart sounds. Pulmonary/Chest: Effort normal and breath sounds normal. No respiratory distress. She has no rales.    Lymphadenopathy:     She has no cervical adenopathy. Neurological: She is alert and oriented to person, place, and time. Skin: Skin is warm and dry. Psychiatric: She has a normal mood and affect. ASSESSMENT/PLAN:    Juliana Villarreal was seen today for cough. Diagnoses and all orders for this visit:    Bronchitis  -     cefdinir (OMNICEF) 300 MG capsule; Take 1 capsule by mouth 2 times daily for 10 days  -     guaiFENesin (MUCINEX) 600 MG extended release tablet; Take 1 tablet by mouth 2 times daily for 10 days  -     benzonatate (TESSALON) 200 MG capsule; Take 1 capsule by mouth 3 times daily as needed for Cough        Return for regularly scheduled follow-up. Please note portions of this note were completed with a voicerecognition program.  Efforts were made to edit the dictations but occasionally words are mis-transcribed.

## 2019-04-08 ENCOUNTER — HOSPITAL ENCOUNTER (OUTPATIENT)
Age: 84
Discharge: HOME | End: 2019-04-08
Payer: MEDICARE

## 2019-04-08 DIAGNOSIS — E78.2 MIXED HYPERLIPIDEMIA: ICD-10-CM

## 2019-04-08 DIAGNOSIS — E11.40 TYPE 2 DIABETES MELLITUS WITH DIABETIC NEUROPATHY, WITH LONG-TERM CURRENT USE OF INSULIN (HCC): Chronic | ICD-10-CM

## 2019-04-08 DIAGNOSIS — Z79.4 TYPE 2 DIABETES MELLITUS WITH DIABETIC NEUROPATHY, WITH LONG-TERM CURRENT USE OF INSULIN (HCC): Chronic | ICD-10-CM

## 2019-04-08 LAB
A/G RATIO: 1.1 (ref 1.1–2.2)
ALBUMIN SERPL-MCNC: 3.8 G/DL (ref 3.4–5)
ALP BLD-CCNC: 60 U/L (ref 40–129)
ALT SERPL-CCNC: 19 U/L (ref 10–40)
ANION GAP SERPL CALCULATED.3IONS-SCNC: 13 MMOL/L (ref 3–16)
AST SERPL-CCNC: 23 U/L (ref 15–37)
BASOPHILS ABSOLUTE: 0.1 K/UL (ref 0–0.2)
BASOPHILS RELATIVE PERCENT: 0.8 %
BILIRUB SERPL-MCNC: <0.2 MG/DL (ref 0–1)
BUN BLDV-MCNC: 60 MG/DL (ref 7–20)
CALCIUM SERPL-MCNC: 9.1 MG/DL (ref 8.3–10.6)
CHLORIDE BLD-SCNC: 107 MMOL/L (ref 99–110)
CHOLESTEROL, FASTING: 203 MG/DL (ref 0–199)
CO2: 27 MMOL/L (ref 21–32)
CREAT SERPL-MCNC: 2.1 MG/DL (ref 0.6–1.2)
EOSINOPHILS ABSOLUTE: 0.5 K/UL (ref 0–0.6)
EOSINOPHILS RELATIVE PERCENT: 5.6 %
ESTIMATED AVERAGE GLUCOSE: 171.4 MG/DL
GFR AFRICAN AMERICAN: 27
GFR NON-AFRICAN AMERICAN: 22
GLOBULIN: 3.5 G/DL
GLUCOSE FASTING: 101 MG/DL (ref 70–99)
HBA1C MFR BLD: 7.6 %
HCT VFR BLD CALC: 34.1 % (ref 36–48)
HDLC SERPL-MCNC: 41 MG/DL (ref 40–60)
HEMOGLOBIN: 11.4 G/DL (ref 12–16)
LDL CHOLESTEROL CALCULATED: 137 MG/DL
LYMPHOCYTES ABSOLUTE: 2.8 K/UL (ref 1–5.1)
LYMPHOCYTES RELATIVE PERCENT: 31.6 %
MCH RBC QN AUTO: 32 PG (ref 26–34)
MCHC RBC AUTO-ENTMCNC: 33.4 G/DL (ref 31–36)
MCV RBC AUTO: 95.7 FL (ref 80–100)
MONOCYTES ABSOLUTE: 0.8 K/UL (ref 0–1.3)
MONOCYTES RELATIVE PERCENT: 9.5 %
NEUTROPHILS ABSOLUTE: 4.6 K/UL (ref 1.7–7.7)
NEUTROPHILS RELATIVE PERCENT: 52.5 %
PDW BLD-RTO: 15 % (ref 12.4–15.4)
PLATELET # BLD: 256 K/UL (ref 135–450)
PMV BLD AUTO: 10.4 FL (ref 5–10.5)
POTASSIUM SERPL-SCNC: 4.2 MMOL/L (ref 3.5–5.1)
RBC # BLD: 3.56 M/UL (ref 4–5.2)
SODIUM BLD-SCNC: 147 MMOL/L (ref 136–145)
T4 FREE: 0.8 NG/DL (ref 0.9–1.8)
TOTAL PROTEIN: 7.3 G/DL (ref 6.4–8.2)
TRIGLYCERIDE, FASTING: 125 MG/DL (ref 0–150)
TSH REFLEX: 5.11 UIU/ML (ref 0.27–4.2)
VLDLC SERPL CALC-MCNC: 25 MG/DL
WBC # BLD: 8.8 K/UL (ref 4–11)

## 2019-04-08 PROCEDURE — 80061 LIPID PANEL: CPT

## 2019-04-08 PROCEDURE — 36415 COLL VENOUS BLD VENIPUNCTURE: CPT

## 2019-04-08 PROCEDURE — 85025 COMPLETE CBC W/AUTO DIFF WBC: CPT

## 2019-04-08 PROCEDURE — 84439 ASSAY OF FREE THYROXINE: CPT

## 2019-04-08 PROCEDURE — 83036 HEMOGLOBIN GLYCOSYLATED A1C: CPT

## 2019-04-08 PROCEDURE — 84443 ASSAY THYROID STIM HORMONE: CPT

## 2019-04-08 PROCEDURE — 80053 COMPREHEN METABOLIC PANEL: CPT

## 2019-04-11 ENCOUNTER — OFFICE VISIT (OUTPATIENT)
Dept: FAMILY MEDICINE CLINIC | Age: 84
End: 2019-04-11
Payer: MEDICARE

## 2019-04-11 VITALS
SYSTOLIC BLOOD PRESSURE: 120 MMHG | OXYGEN SATURATION: 97 % | DIASTOLIC BLOOD PRESSURE: 70 MMHG | WEIGHT: 180 LBS | BODY MASS INDEX: 35.75 KG/M2 | HEART RATE: 67 BPM

## 2019-04-11 DIAGNOSIS — I10 ESSENTIAL HYPERTENSION: Primary | Chronic | ICD-10-CM

## 2019-04-11 DIAGNOSIS — E11.40 TYPE 2 DIABETES MELLITUS WITH DIABETIC NEUROPATHY, WITH LONG-TERM CURRENT USE OF INSULIN (HCC): Chronic | ICD-10-CM

## 2019-04-11 DIAGNOSIS — Z79.4 TYPE 2 DIABETES MELLITUS WITH DIABETIC NEUROPATHY, WITH LONG-TERM CURRENT USE OF INSULIN (HCC): Chronic | ICD-10-CM

## 2019-04-11 DIAGNOSIS — E03.9 ACQUIRED HYPOTHYROIDISM: ICD-10-CM

## 2019-04-11 DIAGNOSIS — I50.22 CHRONIC SYSTOLIC CONGESTIVE HEART FAILURE (HCC): Chronic | ICD-10-CM

## 2019-04-11 DIAGNOSIS — Z13.220 SCREENING FOR LIPID DISORDERS: ICD-10-CM

## 2019-04-11 PROCEDURE — G8417 CALC BMI ABV UP PARAM F/U: HCPCS | Performed by: FAMILY MEDICINE

## 2019-04-11 PROCEDURE — 4040F PNEUMOC VAC/ADMIN/RCVD: CPT | Performed by: FAMILY MEDICINE

## 2019-04-11 PROCEDURE — 1036F TOBACCO NON-USER: CPT | Performed by: FAMILY MEDICINE

## 2019-04-11 PROCEDURE — 99214 OFFICE O/P EST MOD 30 MIN: CPT | Performed by: FAMILY MEDICINE

## 2019-04-11 PROCEDURE — 1090F PRES/ABSN URINE INCON ASSESS: CPT | Performed by: FAMILY MEDICINE

## 2019-04-11 PROCEDURE — G8427 DOCREV CUR MEDS BY ELIG CLIN: HCPCS | Performed by: FAMILY MEDICINE

## 2019-04-11 PROCEDURE — 1123F ACP DISCUSS/DSCN MKR DOCD: CPT | Performed by: FAMILY MEDICINE

## 2019-04-11 RX ORDER — LEVOTHYROXINE SODIUM 0.03 MG/1
25 TABLET ORAL DAILY
Qty: 90 TABLET | Refills: 1 | Status: SHIPPED | OUTPATIENT
Start: 2019-04-11 | End: 2019-01-01 | Stop reason: SDUPTHER

## 2019-04-11 ASSESSMENT — PATIENT HEALTH QUESTIONNAIRE - PHQ9
SUM OF ALL RESPONSES TO PHQ9 QUESTIONS 1 & 2: 0
SUM OF ALL RESPONSES TO PHQ QUESTIONS 1-9: 0
2. FEELING DOWN, DEPRESSED OR HOPELESS: 0
SUM OF ALL RESPONSES TO PHQ QUESTIONS 1-9: 0
1. LITTLE INTEREST OR PLEASURE IN DOING THINGS: 0

## 2019-04-11 ASSESSMENT — ENCOUNTER SYMPTOMS
SHORTNESS OF BREATH: 1
ABDOMINAL PAIN: 0
CONSTIPATION: 0
BACK PAIN: 0
DIARRHEA: 0

## 2019-04-11 NOTE — PROGRESS NOTES
SUBJECTIVE:    Keisha Mcneill is a 80 y.o. female who presents for a follow up visit. Chief Complaint   Patient presents with    Follow-up     Patient is here for  follow up, discuss labs. Diabetes   She presents for her follow-up diabetic visit. She has type 2 diabetes mellitus. The initial diagnosis of diabetes was made 30 years ago. Her disease course has been stable (Hemoglobin A1c 7.6). There are no hypoglycemic associated symptoms. Pertinent negatives for hypoglycemia include no dizziness, headaches or nervousness/anxiousness. Associated symptoms include foot paresthesias. Pertinent negatives for diabetes include no chest pain and no fatigue. There are no hypoglycemic complications. Symptoms are stable. Diabetic complications include nephropathy and peripheral neuropathy. Risk factors for coronary artery disease include diabetes mellitus, dyslipidemia, hypertension, obesity and sedentary lifestyle. Current diabetic treatment includes insulin injections. She is currently taking insulin pre-breakfast, pre-lunch, pre-dinner and at bedtime. Insulin injections are given by patient. Her weight is stable. She is following a generally healthy diet. When asked about meal planning, she reported none. She has not had a previous visit with a dietitian. She never participates in exercise. Her home blood glucose trend is fluctuating minimally. An ACE inhibitor/angiotensin II receptor blocker is being taken. She sees a podiatrist.Eye exam is current. Hyperlipidemia   This is a chronic problem. The current episode started more than 1 year ago. The problem is controlled. Lipid results: Total cholesterol 203, triglycerides 125, HDL 41, . Exacerbating diseases include hypothyroidism and obesity. Factors aggravating her hyperlipidemia include beta blockers and fatty foods. Associated symptoms include shortness of breath (NAGY). Pertinent negatives include no chest pain.  Current antihyperlipidemic treatment includes statins. The current treatment provides significant improvement of lipids. Compliance problems include adherence to exercise and adherence to diet. Risk factors for coronary artery disease include dyslipidemia, diabetes mellitus, hypertension, a sedentary lifestyle and obesity. Hypertension   This is a chronic problem. The current episode started more than 1 year ago. The problem is controlled. Associated symptoms include peripheral edema and shortness of breath (NAGY). Pertinent negatives include no chest pain, headaches, malaise/fatigue or palpitations. There are no associated agents to hypertension. Risk factors for coronary artery disease include diabetes mellitus, dyslipidemia, obesity and sedentary lifestyle. Past treatments include diuretics, beta blockers and angiotensin blockers. The current treatment provides significant improvement. Compliance problems include exercise and diet. Patient's medications, allergies, past medical,surgical, social and family histories were reviewed and updated as appropriate.      Past Medical History:   Diagnosis Date    Allergic rhinitis, cause unspecified     Benign neoplasm of colon     Blood transfusion     CAD (coronary artery disease)     pacemaker/debrillator-ICD    CHF (congestive heart failure) (HCC)     Diabetes mellitus ([facility] [address]     Hyperlipidemia     Hypertension     Meralgia paresthetica of left side 4/28/2014    Mitral valve disorders(424.0)     Occlusion and stenosis of carotid artery without mention of cerebral infarction     Osteoarthrosis, unspecified whether generalized or localized, unspecified site     Osteoporosis, unspecified     Peptic ulcer, unspecified site, unspecified as acute or chronic, without mention of hemorrhage, perforation, or obstruction     Personal history of malignant neoplasm of breast     Type II or unspecified type diabetes mellitus with renal manifestations, not stated as uncontrolled(250.40) 8/26/2014  Unspecified asthma(493.90)      Past Surgical History:   Procedure Laterality Date    A-V CARDIAC PACEMAKER INSERTION      APPENDECTOMY      CARDIAC DEFIBRILLATOR PLACEMENT      CHOLECYSTECTOMY      COLONOSCOPY      COLONOSCOPY  6/3/11    polypectomies x 2    ENDOSCOPY, COLON, DIAGNOSTIC      FINGER TRIGGER RELEASE      KNEE ARTHROSCOPY      OVARY REMOVAL      PACEMAKER PLACEMENT      PARTIAL HYSTERECTOMY      UPPER GASTROINTESTINAL ENDOSCOPY  2012    with biopsies    UPPER GASTROINTESTINAL ENDOSCOPY  12    UPPER GASTROINTESTINAL ENDOSCOPY  12    UPPER GASTROINTESTINAL ENDOSCOPY  12    AND LARYNGOSCOPY WITH TONGUE AND VALLECULA BIOPSY    VARICOSE VEIN SURGERY       Family History   Problem Relation Age of Onset    Heart Disease Mother     Heart Disease Father     Cancer Brother      Social History     Socioeconomic History    Marital status:      Spouse name: Not on file    Number of children: Not on file    Years of education: Not on file    Highest education level: Not on file   Occupational History    Not on file   Social Needs    Financial resource strain: Not on file    Food insecurity:     Worry: Not on file     Inability: Not on file    Transportation needs:     Medical: Not on file     Non-medical: Not on file   Tobacco Use    Smoking status: Former Smoker     Packs/day: 1.00     Years: 50.00     Pack years: 50.00     Types: Cigarettes     Last attempt to quit: 2005     Years since quittin.1    Smokeless tobacco: Never Used    Tobacco comment: quit smoking about 10 years ago   Substance and Sexual Activity    Alcohol use:  Yes     Alcohol/week: 0.0 oz     Comment: occasional    Drug use: No    Sexual activity: Not on file   Lifestyle    Physical activity:     Days per week: Not on file     Minutes per session: Not on file    Stress: Not on file   Relationships    Social connections:     Talks on phone: Not on file     Gets together: Not on file     Attends [employer] service: Not on file     Active member of club or organization: Not on file     Attends meetings of clubs or organizations: Not on file     Relationship status: Not on file    Intimate partner violence:     Fear of current or ex partner: Not on file     Emotionally abused: Not on file     Physically abused: Not on file     Forced sexual activity: Not on file   Other Topics Concern    Not on file   Social History Narrative    Not on file     Allergies   Allergen Reactions    Asa [Aspirin]      GI bleeding    Morphine Other (See Comments)     Makes me goofy I climb the walls    Ace Inhibitors      cough    Codeine Other (See Comments)     Current Outpatient Medications   Medication Sig Dispense Refill    levothyroxine (SYNTHROID) 25 MCG tablet Take 1 tablet by mouth daily 90 tablet 1    cefdinir (OMNICEF) 300 MG capsule Take 1 capsule by mouth 2 times daily for 10 days 20 capsule 0    guaiFENesin (MUCINEX) 600 MG extended release tablet Take 1 tablet by mouth 2 times daily for 10 days 20 tablet 0    alendronate (FOSAMAX) 70 MG tablet TAKE 1 TABLET BY MOUTH  EVERY 7 DAYS 12 tablet 3    furosemide (LASIX) 40 MG tablet Take 2 tablets by mouth 2 times daily 120 tablet 3    allopurinol (ZYLOPRIM) 100 MG tablet Take 2 tablets by mouth daily 180 tablet 3    digoxin (LANOXIN) 125 MCG tablet Take 1 tablet by mouth three times a week Monday, weds, friday 90 tablet 3    blood glucose test strips (ASCENSIA AUTODISC VI;ONE TOUCH ULTRA TEST VI) strip 1 each by In Vitro route 4 times daily 500 each 3    gemfibrozil (LOPID) 600 MG tablet TAKE 1 TABLET BY MOUTH TWO  TIMES DAILY 180 tablet 3    atorvastatin (LIPITOR) 40 MG tablet TAKE 1 TABLET BY MOUTH  NIGHTLY 90 tablet 3    insulin glargine (LANTUS SOLOSTAR) 100 UNIT/ML injection pen Inject 60 Units into the skin nightly 60 mL 3    losartan (COZAAR) 25 MG tablet Take 1 tablet by mouth nightly 90 tablet 3    insulin lispro (HUMALOG Normal range of motion. Neck supple. No JVD present. No tracheal deviation present. No thyromegaly present. Cardiovascular: Normal rate, regular rhythm and normal heart sounds. Pulmonary/Chest: Effort normal and breath sounds normal. No respiratory distress. She has no rales. Musculoskeletal: She exhibits edema (trace pretibial). Lymphadenopathy:     She has no cervical adenopathy. Neurological: She is alert and oriented to person, place, and time. Skin: Skin is warm and dry. Psychiatric: She has a normal mood and affect. ASSESSMENT/PLAN:    Elpidio Patel was seen today for follow-up. Diagnoses and all orders for this visit:    Essential hypertension  Stable on current medications    Chronic systolic congestive heart failure (HCC)  Stable    Type 2 diabetes mellitus with diabetic neuropathy, with long-term current use of insulin (HCC)  -     Hemoglobin A1C; Future    Acquired hypothyroidism  -     TSH with Reflex; Future  -     levothyroxine (SYNTHROID) 25 MCG tablet; Take 1 tablet by mouth daily    Screening for lipid disorders  -     Comprehensive Metabolic Panel, Fasting; Future  -     Lipid, Fasting; Future        Return in about 3 months (around 7/11/2019). With fasting lab prior    Please note portions of this note were completed with a voicerecognition program.  Efforts were made to edit the dictations but occasionally words are mis-transcribed.

## 2019-04-18 NOTE — PROGRESS NOTES
[address]   Advanced Heart Failure/Pulmonary Hypertension  Cardiac Follow up       Kayla Guitérrez  YOB: 1929    Date of Visit:  4/18/19    No chief complaint on file. History of Present Illness:   Kayla Gutiérrez is seen in follow up for s/d CHF, NICM, HTN, s/p BiV ICD [employer] device  (2005 and replaced in [facility]. She had a low EF for years 15-20% but in 2010 it was read as normal) as well as HLD, DM, non obstructive CAD, CKD stage 4 as well has hx of breast cancer and anemia. She has had steroid injections to her knee that limits her ambulation due to chronic problems. She was admitted 3/2018 for respiratory distress, an echo 2/13/2016 showed EF 60%. She follows with Dr. Daryle Linsey from Nephrology. Today, she presents for follow up for CHF with EF of 45% in Nov 2018 s/p BiV ICD, HTN with CKD and hyperlipidemia. She also has Moderate to severe mitral regurgitation on her echo. Per review of notes in Epic, she was started on Omnicef x 10 days, Mucinex x 10 days and Tessalon as needed for Bronchitis per her PCP on 4-2-19.             NYHA:   II  ACC/ AHA Stage:    C    Past Medical History:   has a past medical history of Allergic rhinitis, cause unspecified, Benign neoplasm of colon, Blood transfusion, CAD (coronary artery disease), CHF (congestive heart failure) ([address] Diabetes mellitus ([address] Hyperlipidemia, Hypertension, Meralgia paresthetica of left side, Mitral valve disorders(424.0), Occlusion and stenosis of carotid artery without mention of cerebral infarction, Osteoarthrosis, unspecified whether generalized or localized, unspecified site, Osteoporosis, unspecified, Peptic ulcer, unspecified site, unspecified as acute or chronic, without mention of hemorrhage, perforation, or obstruction, Personal history of malignant neoplasm of breast, Type II or 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 Surgical History:   has a past surgical history that includes Cholecystectomy; Appendectomy; Varicose vein surgery; Ovary removal; Knee arthroscopy; Finger trigger release; A-V cardiac pacemaker insertion; Cardiac defibrillator placement; Colonoscopy; Colonoscopy (6/3/11); partial hysterectomy (cervix not removed); Upper gastrointestinal endoscopy (4/23/2012); Endoscopy, colon, diagnostic; pacemaker placement; Upper gastrointestinal endoscopy (5/2/12); Upper gastrointestinal endoscopy (6/7/12); and Upper gastrointestinal endoscopy (7/13/12). Social History:   reports that she quit smoking about 14 years ago. Her smoking use included cigarettes. She has a 50.00 pack-year smoking history. She has never used smokeless tobacco. She reports that she drinks alcohol. She reports that she does not use drugs. Family History:   Family History   Problem Relation Age of Onset    Heart Disease Mother     Heart Disease Father     Cancer Brother        Home Medications:  Prior to Admission medications    Medication Sig Start Date End Date Taking?  Authorizing Provider   levothyroxine (SYNTHROID) 25 MCG tablet Take 1 tablet by mouth daily 4/11/19   Corrina Millan MD   alendronate (FOSAMAX) 70 MG tablet TAKE 1 TABLET BY MOUTH  EVERY 7 DAYS 4/1/19   Boyd Chan MD   furosemide (LASIX) 40 MG tablet Take 2 tablets by mouth 2 times daily 3/12/19 3/11/20  Corrina Millan MD   allopurinol (ZYLOPRIM) 100 MG tablet Take 2 tablets by mouth daily 12/5/18   Raya Jordan MD   digoxin [facility]) 125 MCG tablet Take 1 tablet by mouth three times a week Monday, weds, friday 12/5/18   Raya Jordan MD   blood glucose test strips (ASCENSIA AUTODISC VI;ONE TOUCH ULTRA TEST VI) strip 1 each by In Vitro route 4 times daily 12/5/18   Raya Jordan MD   gemfibrozil (LOPID) 600 MG tablet TAKE 1 TABLET BY MOUTH TWO  TIMES DAILY 12/5/18   Raya Jordan MD   atorvastatin (LIPITOR) 40 MG tablet TAKE 1 TABLET BY MOUTH  NIGHTLY 12/5/18   Missy Julio MD   insulin glargine (LANTUS SOLOSTAR) 100 UNIT/ML injection pen Inject 60 Units into the skin nightly 12/5/18   Missy Julio MD   losartan (COZAAR) 25 MG tablet Take 1 tablet by mouth nightly 12/5/18   Missy Julio MD   insulin lispro (HUMALOG KWIKPEN) 100 UNIT/ML pen Inject 25 Units into the skin 3 times daily (before meals) 9/6/18   Madelyn Van MD   carvedilol (COREG) 3.125 MG tablet TAKE 1 TABLET BY MOUTH TWO  TIMES DAILY WITH MEALS 6/29/18   Dayton Snyder MD   Blood Glucose Monitoring Suppl RAFAEL 1 Device by Does not apply route 4 times daily 3/21/16   Missy Julio MD   Maryfrances Peasant LANCETS FINE MISC TEST FOUR TIMES DAILY 3/21/16   Missy Julio MD   fluocinonide (LIDEX) 0.05 % external solution Apply topically 2 times daily. 7/1/15   Missy Julio MD   Insulin Pen Needle (BD PEN NEEDLE GABRIEL U/F) 32G X 4 MM MISC 1 each by Does not apply route 3 times daily. 4/28/14   Missy Julio MD   FERROUS SULFATE Take 325 mg by mouth daily Pt. Unsure of dose, dr julia Urban ordered it 4/29/12   Historical Provider, MD   Garlic 438 MG TABS Take 1 tablet by mouth daily     Historical Provider, MD   Multiple Vitamins-Minerals (CENTRUM SILVER PO) Take 1 tablet by mouth daily. Historical Provider, MD        Allergies:  Zollie Dial [aspirin]; Morphine; Ace inhibitors; and Codeine     Review of Systems:   · Constitutional: there has been no unanticipated weight loss. There's been no change in energy level, sleep pattern, or activity level. · Eyes: No visual changes or diplopia. No scleral icterus. · ENT: No Headaches, hearing loss or vertigo. No mouth sores or sore throat. · Cardiovascular: Reviewed in HPI  · Respiratory: No cough or wheezing, no sputum production. No hematemesis. · Gastrointestinal: No abdominal pain, appetite loss, blood in stools.  No change in bowel or bladder WBC 9.0 07/30/2018    RBC 3.56 04/08/2019    RBC 3.54 11/26/2018    RBC 3.59 07/30/2018    RBC 3.49 10/29/2015    RBC 3.26 10/07/2015    HGB 11.4 04/08/2019    HGB 11.4 11/26/2018    HGB 11.2 07/30/2018    HCT 34.1 04/08/2019    HCT 34.2 11/26/2018    HCT 34.0 07/30/2018    MCV 95.7 04/08/2019    MCV 96.5 11/26/2018    MCV 94.6 07/30/2018    RDW 15.0 04/08/2019    RDW 15.1 11/26/2018    RDW 15.3 07/30/2018     04/08/2019     11/26/2018     07/30/2018     BMP:   Lab Results   Component Value Date     04/08/2019     03/11/2019     12/17/2018    K 4.2 04/08/2019    K 4.2 03/11/2019    K 3.5 12/17/2018     04/08/2019     03/11/2019     12/17/2018    CO2 27 04/08/2019    CO2 27 03/11/2019    CO2 25 12/17/2018    PHOS 4.2 01/29/2018    PHOS 5.2 12/13/2017    PHOS 4.9 02/16/2016    BUN 60 04/08/2019    BUN 48 03/11/2019    BUN 50 12/17/2018    CREATININE 2.1 04/08/2019    CREATININE 1.9 03/11/2019    CREATININE 1.8 12/17/2018     BNP:   Lab Results   Component Value Date    PROBNP 1,828 12/17/2018    PROBNP 1,027 11/26/2018    PROBNP 1,263 08/09/2018     Recent Hospitalization or Testing:     Echo 11-26-18  Summary   -The left ventricle is mildly dilated.   -Mildly reduced global systolic function with an ejection fraction estimated at 45%.  -Basal and mid lateral and basal and mid inferolateral wall akinesis noted.   -Moderate to severe mitral regurgitation.   -Trivial aortic regurgitation.   -There is mild-to-moderate tricuspid regurgitation with a RVSP estimation of 44 mmHg.   -Mild to moderate pulmonic regurgitation present.   -Diastolic filling parameters suggest grade II diastolic   dysfunction. E/e'=18.15   -Possible pleural effusion noted measuring 6.62 cm.   -Pacemaker / ICD lead is visualized in the right heart. Echo 2/13/16:    Technically limited study due to body habitus. Left ventricular size is mildly increased .   Left ventricular function is borderline with ejection fraction estimated at 50 %. Small posterobasal LV aneurysm seen on parasternal long views. Moderate to severe mitral regurgitation is present. The left atrium is dilated. Pacer / ICD wire is visualized in the right ventricle. There is mild-moderate tricuspid regurgitation with RVSP estimated at 43 mmHg. Assessment:  No diagnosis found. Congestive heart failure,    Bi-v ICD--Implanted 2005. Battery replaced 2012 in [place].  Stable and followed in device clinic    Essential HTN      Plan:   1  2  3            I appreciate the opportunity of cooperating in the care of this individual.    Chandana Ramirez M.D., [facility] [place]

## 2019-04-22 ENCOUNTER — OFFICE VISIT (OUTPATIENT)
Dept: CARDIOLOGY CLINIC | Age: 84
End: 2019-04-22
Payer: MEDICARE

## 2019-04-22 VITALS
HEART RATE: 68 BPM | WEIGHT: 179 LBS | SYSTOLIC BLOOD PRESSURE: 132 MMHG | DIASTOLIC BLOOD PRESSURE: 64 MMHG | BODY MASS INDEX: 36.08 KG/M2 | HEIGHT: 59 IN

## 2019-04-22 DIAGNOSIS — N18.4 CKD (CHRONIC KIDNEY DISEASE), STAGE IV (HCC): ICD-10-CM

## 2019-04-22 DIAGNOSIS — I50.22 SYSTOLIC CHF, CHRONIC (HCC): Primary | ICD-10-CM

## 2019-04-22 DIAGNOSIS — Z95.810 AUTOMATIC IMPLANTABLE CARDIOVERTER-DEFIBRILLATOR IN SITU: Chronic | ICD-10-CM

## 2019-04-22 DIAGNOSIS — J41.0 SIMPLE CHRONIC BRONCHITIS (HCC): ICD-10-CM

## 2019-04-22 DIAGNOSIS — I10 ESSENTIAL HYPERTENSION: Chronic | ICD-10-CM

## 2019-04-22 PROCEDURE — 1090F PRES/ABSN URINE INCON ASSESS: CPT | Performed by: INTERNAL MEDICINE

## 2019-04-22 PROCEDURE — 1036F TOBACCO NON-USER: CPT | Performed by: INTERNAL MEDICINE

## 2019-04-22 PROCEDURE — 3023F SPIROM DOC REV: CPT | Performed by: INTERNAL MEDICINE

## 2019-04-22 PROCEDURE — G8427 DOCREV CUR MEDS BY ELIG CLIN: HCPCS | Performed by: INTERNAL MEDICINE

## 2019-04-22 PROCEDURE — G8926 SPIRO NO PERF OR DOC: HCPCS | Performed by: INTERNAL MEDICINE

## 2019-04-22 PROCEDURE — G8417 CALC BMI ABV UP PARAM F/U: HCPCS | Performed by: INTERNAL MEDICINE

## 2019-04-22 PROCEDURE — 1123F ACP DISCUSS/DSCN MKR DOCD: CPT | Performed by: INTERNAL MEDICINE

## 2019-04-22 PROCEDURE — 99214 OFFICE O/P EST MOD 30 MIN: CPT | Performed by: INTERNAL MEDICINE

## 2019-04-22 PROCEDURE — 4040F PNEUMOC VAC/ADMIN/RCVD: CPT | Performed by: INTERNAL MEDICINE

## 2019-04-22 NOTE — PROGRESS NOTES
[facility]   Advanced Heart Failure/Pulmonary Hypertension  Cardiac Follow up       Kiersten Guerrero  YOB: 1929    Date of Visit:  4/22/19    Chief Complaint   Patient presents with    Congestive Heart Failure     No cardiac compalints     History of Present Illness:   Kiersten Guerrero is seen in follow up for s/d CHF, NICM, HTN, s/p BiV ICD (2005) as well as HLD, DM, CVD as well has hx of breast cancer and anemia. She has had steroid injections to her knee that limits her ambulation due to chronic problems. She was admitted 3/2018 for respiratory distress, an echo 2/13/2016 showed EF 60% and normal and discharge home improved. Today she states she is doing well from the cardiac standpoint. She celebrated her 80 th birthday last month and her family threw her a surprise party. Blood work earlier this month showed patient appeared slightly dehydrated. Her weight is down 7-8 lbs since March. She takes furosemide 80 mg twice a day. This dose was increased by Dr. Marcie Dillon but was also approved by Dr. Kenn Charlton. Her edema and shortness of breath have improved. She gets short of breath when she does too much, but she denies orthopnea, PND, palpitations. She was started on low dose of Synthroid due to TSH 5.11. Recent Hospitalization or Testing:     Echo 11/26/18: The left ventricle is mildly dilated.   -Mildly reduced global systolic function with an ejection fraction estimated   at 45%.  -Basal and mid lateral and basal and mid inferolateral wall akinesis noted.   -Moderate to severe mitral regurgitation.   -Trivial aortic regurgitation.   -There is mild-to-moderate tricuspid regurgitation with a RVSP estimation of   44 mmHg.   -Mild to moderate pulmonic regurgitation present.   -Diastolic filling parameters suggest grade II diastolic   dysfunction. E/e'=18.15   -Possible pleural effusion noted measuring 6.62 cm.   -Pacemaker / ICD lead is visualized in the right heart.       Echo 2/13/16:    Technically limited study due to body habitus. Left ventricular size is mildly increased . Left ventricular function is borderline with ejection fraction estimated at 50 %. Small posterobasal LV aneurysm seen on parasternal long views. Moderate to severe mitral regurgitation is present. The left atrium is dilated. Pacer / ICD wire is visualized in the right ventricle. There is mild-moderate tricuspid regurgitation with RVSP estimated at 43 mmHg. NYHA:   II  ACC/ AHA Stage:    C    Past Medical History:   has a past medical history of Allergic rhinitis, cause unspecified, Benign neoplasm of colon, Blood transfusion, CAD (coronary artery disease), CHF (congestive heart failure) ([address] Diabetes mellitus ([address] Hyperlipidemia, Hypertension, Meralgia paresthetica of left side, Mitral valve disorders(424.0), Occlusion and stenosis of carotid artery without mention of cerebral infarction, Osteoarthrosis, unspecified whether generalized or localized, unspecified site, Osteoporosis, unspecified, Peptic ulcer, unspecified site, unspecified as acute or chronic, without mention of hemorrhage, perforation, or obstruction, Personal history of malignant neoplasm of breast, Type II or 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 Surgical History:   has a past surgical history that includes Cholecystectomy; Appendectomy; Varicose vein surgery; Ovary removal; Knee arthroscopy; Finger trigger release; A-V cardiac pacemaker insertion; Cardiac defibrillator placement; Colonoscopy; Colonoscopy (6/3/11); partial hysterectomy (cervix not removed); Upper gastrointestinal endoscopy (4/23/2012); Endoscopy, colon, diagnostic; pacemaker placement; Upper gastrointestinal endoscopy (5/2/12); Upper gastrointestinal endoscopy (6/7/12); and Upper gastrointestinal endoscopy (7/13/12).    Social History:   reports that she quit smoking about clots or swollen lymph nodes. · Allergic/Immunologic: No nasal congestion or hives. Physical Examination:        Wt Readings from Last 3 Encounters:   04/22/19 179 lb (81.2 kg)   04/11/19 180 lb (81.6 kg)   04/02/19 179 lb (81.2 kg)     BP Readings from Last 3 Encounters:   04/22/19 132/64   04/11/19 120/70   04/02/19 137/66        Constitutional and General Appearance: Warm and dry, no apparent distress, normal coloration  HEENT:  Normocephalic, atraumatic  Respiratory:  · Normal excursion and expansion without use of accessory muscles  · Resp Auscultation: Normal breath sounds without dullness  Cardiovascular:  · The apical impulses not displaced  · Heart tones are crisp and normal  · JVP normal  · Regular rate and rhythm, normal E0J5, soft systolic murmur, no g/r/c  · Peripheral pulses are symmetrical and full  · There is no clubbing, cyanosis of the extremities.   · Trace edema bilaterally  · Pedal Pulses: 2+ and equal   Abdomen:  · No masses or tenderness  · Liver/Spleen: No Abnormalities Noted  Neurological/Psychiatric:  · Alert and oriented in all spheres  · Moves all extremities well  · Exhibits normal gait balance and coordination  · No abnormalities of mood, affect, memory, mentation, or behavior are noted    Lab Data:  CBC:   Lab Results   Component Value Date    WBC 8.8 04/08/2019    WBC 6.0 11/26/2018    WBC 9.0 07/30/2018    RBC 3.56 04/08/2019    RBC 3.54 11/26/2018    RBC 3.59 07/30/2018    RBC 3.49 10/29/2015    RBC 3.26 10/07/2015    HGB 11.4 04/08/2019    HGB 11.4 11/26/2018    HGB 11.2 07/30/2018    HCT 34.1 04/08/2019    HCT 34.2 11/26/2018    HCT 34.0 07/30/2018    MCV 95.7 04/08/2019    MCV 96.5 11/26/2018    MCV 94.6 07/30/2018    RDW 15.0 04/08/2019    RDW 15.1 11/26/2018    RDW 15.3 07/30/2018     04/08/2019     11/26/2018     07/30/2018     BMP:   Lab Results   Component Value Date     04/08/2019     03/11/2019     12/17/2018    K 4.2 04/08/2019 K 4.2 03/11/2019    K 3.5 12/17/2018     04/08/2019     03/11/2019     12/17/2018    CO2 27 04/08/2019    CO2 27 03/11/2019    CO2 25 12/17/2018    PHOS 4.2 01/29/2018    PHOS 5.2 12/13/2017    PHOS 4.9 02/16/2016    BUN 60 04/08/2019    BUN 48 03/11/2019    BUN 50 12/17/2018    CREATININE 2.1 04/08/2019    CREATININE 1.9 03/11/2019    CREATININE 1.8 12/17/2018     BNP:   Lab Results   Component Value Date    PROBNP 1,828 12/17/2018    PROBNP 1,027 11/26/2018    PROBNP 1,263 08/09/2018     Labs were reviewed including labs from other hospital systems through [facility]. Cardiac testing was reviewed including echos, nuclear scans, cardiac catheterization, including from other hospital systems through [facility]. Assessment:  1. Systolic CHF, chronic ([address]    2. Simple chronic bronchitis ([address]    3. CKD (chronic kidney disease), stage IV ([address]    4. Essential hypertension    5. Automatic implantable cardioverter-defibrillator in situ        Congestive heart failure, unspecified congestive heart failure chronicity, unspecified congestive heart failure type [address] [address]--  2/2016 Echo - EF 50%, 11/2018 Echo - EF 45%. 4/8/19 labs suggest patient is dry - BUN 60, Creat 2.1 although patient feels better with less shortness of breath and edema. Will adjust furosemide dose slightly . >> furosemide 80 mg am and 40 mg pm three days a week, continue 80 mg twice a day all other days of the week  Patient to continue to monitor weight and call office if shortness of breath, edema worsens or weight increases    Bi-v ICD--implanted 2005. Battery replaced 2012 in [place]. Stable and followed in [facility] device clinic  Interrogations every 3 months. Essential HTN-  Blood pressure 132/64, pulse 68, height 4' 11\" (1.499 m), weight 179 lb (81.2 kg), not currently breastfeeding.   BP stable - no change in medications    CKD  Follows with Dr. Senia Sullivan - 4/8/19 labs - BUN 60, Creat 2.1 (up from BUN 48, Creat 1.9 on 3/11/19)      Plan:   1. Furosemide 80 mg in am and 40 mg in pm on Mon, Wed, Fri. Take furosemide 80 mg twice a day on other days of week (Tues, Thurs, Sat, Sun). 2. Repeat labs to be ordered by Dr. Chilango Hodge in June 2019  3. Keep weighing yourself. Call our office for worsening shortness of breath, swelling, or weight gain  4. Follow up in six months     I appreciate the opportunity of cooperating in the care of this individual.    Scribe's attestation: This note was scribed in the presence of Lord Yang M.D. by Eula Mccloud RN    The scribe's documentation has been prepared under my direction and personally reviewed by me in its entirety.   I confirm that the note above accurately reflects all work, treatment, procedures, and medical decision making performed by me.        Dianne Pierson M.D., [facility]

## 2019-04-22 NOTE — LETTER
[address] [facility]  [address] [address] [place] [address]  Phone: [phone]  Fax: [phone]    Kiran Gamez MD        April 23, 2019     Glenis Sylvester MD  [address] [facility]  [address]    Patient: Cheri Botello  MR Number: [id number]  YOB: 1929  Date of Visit: 4/22/2019    Dear Dr. Glenis Sylvester:     Below are the relevant portions of my assessment and plan of care. [address]   Advanced Heart Failure/Pulmonary Hypertension  Cardiac Follow up       Cheri Botello  YOB: 1929    Date of Visit:  4/18/19    No chief complaint on file. History of Present Illness:   Cheri Botello is seen in follow up for s/d CHF, NICM, HTN, s/p BiV ICD [employer] device  (2005 and replaced in [facility]. She had a low EF for years 15-20% but in 2010 it was read as normal) as well as HLD, DM, non obstructive CAD, CKD stage 4 as well has hx of breast cancer and anemia. She has had steroid injections to her knee that limits her ambulation due to chronic problems. She was admitted 3/2018 for respiratory distress, an echo 2/13/2016 showed EF 60%. She follows with Dr. Bernadine Martínez from Nephrology. Today, she presents for follow up for CHF with EF of 45% in Nov 2018 s/p BiV ICD, HTN with CKD and hyperlipidemia. She also has Moderate to severe mitral regurgitation on her echo. Per review of notes in Epic, she was started on Omnicef x 10 days, Mucinex x 10 days and Tessalon as needed for Bronchitis per her PCP on 4-2-19.             NYHA:   II  ACC/ AHA Stage:    C    Past Medical History:   has a past medical history of Allergic rhinitis, cause unspecified, Benign neoplasm of colon, Blood transfusion, CAD (coronary artery disease), CHF (congestive heart failure) ([address] Diabetes mellitus ([address] Hyperlipidemia, Hypertension, Meralgia paresthetica of left side, Mitral valve disorders(424.0), Occlusion and stenosis of carotid artery without Ana Luisa Antonio MD   digoxin [facility] TRANSPLANT [facility]) 125 MCG tablet Take 1 tablet by mouth three times a week Monday, weds, friday 12/5/18   Ana Luisa Antonio MD   blood glucose test strips (ASCENSIA AUTODISC VI;ONE TOUCH ULTRA TEST VI) strip 1 each by In Vitro route 4 times daily 12/5/18   nAa Luisa Antonio MD   gemfibrozil (LOPID) 600 MG tablet TAKE 1 TABLET BY MOUTH TWO  TIMES DAILY 12/5/18   Ana Luisa Antonio MD   atorvastatin (LIPITOR) 40 MG tablet TAKE 1 TABLET BY MOUTH  NIGHTLY 12/5/18   Ana Luisa Antonio MD   insulin glargine (LANTUS SOLOSTAR) 100 UNIT/ML injection pen Inject 60 Units into the skin nightly 12/5/18   Ana Luisa Antonio MD   losartan (COZAAR) 25 MG tablet Take 1 tablet by mouth nightly 12/5/18   Ana Luisa Antonio MD   insulin lispro (HUMALOG KWIKPEN) 100 UNIT/ML pen Inject 25 Units into the skin 3 times daily (before meals) 9/6/18   Taniya Carey MD   carvedilol (COREG) 3.125 MG tablet TAKE 1 TABLET BY MOUTH TWO  TIMES DAILY WITH MEALS 6/29/18   Min Mckeon MD   Blood Glucose Monitoring Suppl RAFAEL 1 Device by Does not apply route 4 times daily 3/21/16   Ana Luisa Antonio MD   Jyl Petite LANCETS FINE [address] TEST FOUR TIMES DAILY 3/21/16   Ana Luisa Antonio MD   fluocinonide (LIDEX) 0.05 % external solution Apply topically 2 times daily. 7/1/15   Ana Luisa Antonio MD   Insulin Pen Needle (BD PEN NEEDLE GABRIEL U/F) 32G X 4 MM MISC 1 each by Does not apply route 3 times daily. 4/28/14   Ana Luisa Antonio MD   FERROUS SULFATE Take 325 mg by mouth daily Pt. Unsure of dose, dr julia Mcghee ordered it 4/29/12   Historical Provider, MD   Garlic 292 MG TABS Take 1 tablet by mouth daily     Historical Provider, MD   Multiple Vitamins-Minerals (CENTRUM SILVER PO) Take 1 tablet by mouth daily. Historical Provider, MD        Allergies:  Caryn Grand [aspirin]; Morphine;  Ace inhibitors; and Codeine     Review of Systems: · Constitutional: there has been no unanticipated weight loss. There's been no change in energy level, sleep pattern, or activity level. · Eyes: No visual changes or diplopia. No scleral icterus. · ENT: No Headaches, hearing loss or vertigo. No mouth sores or sore throat. · Cardiovascular: Reviewed in HPI  · Respiratory: No cough or wheezing, no sputum production. No hematemesis. · Gastrointestinal: No abdominal pain, appetite loss, blood in stools. No change in bowel or bladder habits. · Genitourinary: No dysuria, trouble voiding, or hematuria. · Musculoskeletal:  No gait disturbance, weakness or joint complaints. · Integumentary: No rash or pruritis. · Neurological: No headache, diplopia, change in muscle strength, numbness or tingling. No change in gait, balance, coordination, mood, affect, memory, mentation, behavior. · Psychiatric: No anxiety, no depression. · Endocrine: No malaise, fatigue or temperature intolerance. No excessive thirst, fluid intake, or urination. No tremor. · Hematologic/Lymphatic: No abnormal bruising or bleeding, blood clots or swollen lymph nodes. · Allergic/Immunologic: No nasal congestion or hives.     Physical Examination:        Wt Readings from Last 3 Encounters:   04/11/19 180 lb (81.6 kg)   04/02/19 179 lb (81.2 kg)   03/18/19 186 lb 9.6 oz (84.6 kg)     BP Readings from Last 3 Encounters:   04/11/19 120/70   04/02/19 137/66   03/18/19 (!) 170/75        Constitutional and General Appearance: Warm and dry, no apparent distress, normal coloration  HEENT:  Normocephalic, atraumatic  Respiratory:  · Normal excursion and expansion without use of accessory muscles  · Resp Auscultation: Normal breath sounds without dullness  Cardiovascular:  · The apical impulses not displaced  · Heart tones are crisp and normal  · JVP less than 8 cm H2O  · Regular rate and rhythm, normal J7Q7, soft systolic murmur, no g/r/c  · Peripheral pulses are symmetrical and full · There is no clubbing, cyanosis of the extremities. · Trace non-pitting edema  · Pedal Pulses: 2+ and equal   Abdomen:  · No masses or tenderness  · Liver/Spleen: No Abnormalities Noted  Neurological/Psychiatric:  · Alert and oriented in all spheres  · Moves all extremities well  · Exhibits normal gait balance and coordination  · No abnormalities of mood, affect, memory, mentation, or behavior are noted    Lab Data:  CBC:   Lab Results   Component Value Date    WBC 8.8 04/08/2019    WBC 6.0 11/26/2018    WBC 9.0 07/30/2018    RBC 3.56 04/08/2019    RBC 3.54 11/26/2018    RBC 3.59 07/30/2018    RBC 3.49 10/29/2015    RBC 3.26 10/07/2015    HGB 11.4 04/08/2019    HGB 11.4 11/26/2018    HGB 11.2 07/30/2018    HCT 34.1 04/08/2019    HCT 34.2 11/26/2018    HCT 34.0 07/30/2018    MCV 95.7 04/08/2019    MCV 96.5 11/26/2018    MCV 94.6 07/30/2018    RDW 15.0 04/08/2019    RDW 15.1 11/26/2018    RDW 15.3 07/30/2018     04/08/2019     11/26/2018     07/30/2018     BMP:   Lab Results   Component Value Date     04/08/2019     03/11/2019     12/17/2018    K 4.2 04/08/2019    K 4.2 03/11/2019    K 3.5 12/17/2018     04/08/2019     03/11/2019     12/17/2018    CO2 27 04/08/2019    CO2 27 03/11/2019    CO2 25 12/17/2018    PHOS 4.2 01/29/2018    PHOS 5.2 12/13/2017    PHOS 4.9 02/16/2016    BUN 60 04/08/2019    BUN 48 03/11/2019    BUN 50 12/17/2018    CREATININE 2.1 04/08/2019    CREATININE 1.9 03/11/2019    CREATININE 1.8 12/17/2018     BNP:   Lab Results   Component Value Date    PROBNP 1,828 12/17/2018    PROBNP 1,027 11/26/2018    PROBNP 1,263 08/09/2018     Recent Hospitalization or Testing:     Echo 11-26-18  Summary   -The left ventricle is mildly dilated.   -Mildly reduced global systolic function with an ejection fraction estimated at 45%.  -Basal and mid lateral and basal and mid inferolateral wall akinesis noted.   -Moderate to severe mitral regurgitation.  -Trivial aortic regurgitation.   -There is mild-to-moderate tricuspid regurgitation with a RVSP estimation of 44 mmHg.   -Mild to moderate pulmonic regurgitation present.   -Diastolic filling parameters suggest grade II diastolic   dysfunction. E/e'=18.15   -Possible pleural effusion noted measuring 6.62 cm.   -Pacemaker / ICD lead is visualized in the right heart. Echo 2/13/16:    Technically limited study due to body habitus. Left ventricular size is mildly increased . Left ventricular function is borderline with ejection fraction estimated at 50 %. Small posterobasal LV aneurysm seen on parasternal long views. Moderate to severe mitral regurgitation is present. The left atrium is dilated. Pacer / ICD wire is visualized in the right ventricle. There is mild-moderate tricuspid regurgitation with RVSP estimated at 43 mmHg. Assessment:  No diagnosis found. Congestive heart failure,    Bi-v ICD--Implanted 2005. Battery replaced 2012 in [place]. Stable and followed in device clinic    Essential HTN      Plan:   1  2  3            I appreciate the opportunity of cooperating in the care of this individual.    Mario Eid M.D., [address] [address] [facility].   Advanced Heart Failure/Pulmonary Hypertension  Cardiac Follow up       Kayla Gutiérrez  YOB: 1929    Date of Visit:  4/22/19    Chief Complaint   Patient presents with    Congestive Heart Failure     No cardiac compalints     History of Present Illness:   Kayla Gutiérrez is seen in follow up for s/d CHF, NICM, HTN, s/p BiV ICD (2005) as well as HLD, DM, CVD as well has hx of breast cancer and anemia. She has had steroid injections to her knee that limits her ambulation due to chronic problems. She was admitted 3/2018 for respiratory distress, an echo 2/13/2016 showed EF 60% and normal and discharge home improved. Today she states she is doing well from the cardiac standpoint.   She celebrated her 80 th birthday last month and her family threw her a surprise party. Blood work earlier this month showed patient appeared slightly dehydrated. Her weight is down 7-8 lbs since March. She takes furosemide 80 mg twice a day. This dose was increased by Dr. Daquan Jeffries but was also approved by Dr. Oren Oswald. Her edema and shortness of breath have improved. She gets short of breath when she does too much, but she denies orthopnea, PND, palpitations. She was started on low dose of Synthroid due to TSH 5.11. Recent Hospitalization or Testing:     Echo 11/26/18: The left ventricle is mildly dilated.   -Mildly reduced global systolic function with an ejection fraction estimated   at 45%.  -Basal and mid lateral and basal and mid inferolateral wall akinesis noted.   -Moderate to severe mitral regurgitation.   -Trivial aortic regurgitation.   -There is mild-to-moderate tricuspid regurgitation with a RVSP estimation of   44 mmHg.   -Mild to moderate pulmonic regurgitation present.   -Diastolic filling parameters suggest grade II diastolic   dysfunction. E/e'=18.15   -Possible pleural effusion noted measuring 6.62 cm.   -Pacemaker / ICD lead is visualized in the right heart. Echo 2/13/16:    Technically limited study due to body habitus. Left ventricular size is mildly increased . Left ventricular function is borderline with ejection fraction estimated at 50 %. Small posterobasal LV aneurysm seen on parasternal long views. Moderate to severe mitral regurgitation is present. The left atrium is dilated. Pacer / ICD wire is visualized in the right ventricle. There is mild-moderate tricuspid regurgitation with RVSP estimated at 43 mmHg.       NYHA:   II  ACC/ AHA Stage:    C    Past Medical History:   has a past medical history of Allergic rhinitis, cause unspecified, Benign neoplasm of colon, Blood transfusion, CAD (coronary artery disease), CHF (congestive heart failure) [address] [address] Diabetes mellitus ([facility] [address] Hyperlipidemia, Hypertension, Meralgia paresthetica of left side, Mitral valve disorders(424.0), Occlusion and stenosis of carotid artery without mention of cerebral infarction, Osteoarthrosis, unspecified whether generalized or localized, unspecified site, Osteoporosis, unspecified, Peptic ulcer, unspecified site, unspecified as acute or chronic, without mention of hemorrhage, perforation, or obstruction, Personal history of malignant neoplasm of breast, Type II or 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 Surgical History:   has a past surgical history that includes Cholecystectomy; Appendectomy; Varicose vein surgery; Ovary removal; Knee arthroscopy; Finger trigger release; A-V cardiac pacemaker insertion; Cardiac defibrillator placement; Colonoscopy; Colonoscopy (6/3/11); partial hysterectomy (cervix not removed); Upper gastrointestinal endoscopy (4/23/2012); Endoscopy, colon, diagnostic; pacemaker placement; Upper gastrointestinal endoscopy (5/2/12); Upper gastrointestinal endoscopy (6/7/12); and Upper gastrointestinal endoscopy (7/13/12). Social History:   reports that she quit smoking about 14 years ago. Her smoking use included cigarettes. She has a 50.00 pack-year smoking history. She has never used smokeless tobacco. She reports that she drinks alcohol. She reports that she does not use drugs. Family History:   Family History   Problem Relation Age of Onset    Heart Disease Mother     Heart Disease Father     Cancer Brother        Home Medications:  Prior to Admission medications    Medication Sig Start Date End Date Taking?  Authorizing Provider   levothyroxine (SYNTHROID) 25 MCG tablet Take 1 tablet by mouth daily 4/11/19  Yes Pierre Matamoros MD   alendronate (FOSAMAX) 70 MG tablet TAKE 1 TABLET BY MOUTH  EVERY 7 DAYS 4/1/19  Yes Herrera Nelson MD   furosemide (LASIX) 40 MG tablet Take 2 tablets by mouth 2 times daily 3/12/19 3/11/20 Yes Herrera Nelson MD   allopurinol (ZYLOPRIM) 100 MG tablet Take 2 tablets by mouth daily 12/5/18  Yes Rip Martins MD   digoxin [facility]) 125 MCG tablet Take 1 tablet by mouth three times a week Monday, weds, friday 12/5/18  Yes Rip Martins MD   gemfibrozil (LOPID) 600 MG tablet TAKE 1 TABLET BY MOUTH TWO  TIMES DAILY 12/5/18  Yes Rip Martins MD   atorvastatin (LIPITOR) 40 MG tablet TAKE 1 TABLET BY MOUTH  NIGHTLY 12/5/18  Yes Rip Martins MD   insulin glargine (LANTUS SOLOSTAR) 100 UNIT/ML injection pen Inject 60 Units into the skin nightly 12/5/18  Yes Rip Martins MD   losartan (COZAAR) 25 MG tablet Take 1 tablet by mouth nightly 12/5/18  Yes Rip Martins MD   insulin lispro (HUMALOG KWIKPEN) 100 UNIT/ML pen Inject 25 Units into the skin 3 times daily (before meals) 9/6/18  Yes Rosendo Persaud MD   carvedilol (COREG) 3.125 MG tablet TAKE 1 TABLET BY MOUTH TWO  TIMES DAILY WITH MEALS 6/29/18  Yes David Foss MD   fluocinonide (LIDEX) 0.05 % external solution Apply topically 2 times daily. 7/1/15  Yes Rip Martins MD   FERROUS SULFATE Take 325 mg by mouth daily Pt.  Unsure of dose, dr julia Mullen ordered it 4/29/12  Yes Historical Provider, MD   Garlic 490 MG TABS Take 1 tablet by mouth daily    Yes Historical Provider, MD   blood glucose test strips (ASCENSIA AUTODISC VI;ONE TOUCH ULTRA TEST VI) strip 1 each by In Vitro route 4 times daily 12/5/18   Rip Martins MD   Blood Glucose Monitoring Suppl RAFAEL 1 Device by Does not apply route 4 times daily 3/21/16   Rip Martins MD   [facility] LANCETS FINE MISC TEST FOUR TIMES DAILY 3/21/16   Rip Martins MD   Insulin Pen Needle (BD PEN NEEDLE GABRIEL U/F) 32G X 4 MM MISC 1 each by Does not apply route 3 times daily. 4/28/14   Maryuri Sands MD   Multiple Vitamins-Minerals (CENTRUM SILVER PO) Take 1 tablet by mouth daily. Historical Provider, MD        Allergies:  Veronica Helling [aspirin]; Morphine; Ace inhibitors; and Codeine     Review of Systems:   · Constitutional: there has been no unanticipated weight loss. There's been no change in energy level, sleep pattern, or activity level. · Eyes: No visual changes or diplopia. No scleral icterus. · ENT: No Headaches, hearing loss or vertigo. No mouth sores or sore throat. · Cardiovascular: Reviewed in HPI  · Respiratory: No cough or wheezing, no sputum production. No hematemesis. · Gastrointestinal: No abdominal pain, appetite loss, blood in stools. No change in bowel or bladder habits. · Genitourinary: No dysuria, trouble voiding, or hematuria. · Musculoskeletal:  No gait disturbance, weakness or joint complaints. · Integumentary: No rash or pruritis. · Neurological: No headache, diplopia, change in muscle strength, numbness or tingling. No change in gait, balance, coordination, mood, affect, memory, mentation, behavior. · Psychiatric: No anxiety, no depression. · Endocrine: No malaise, fatigue or temperature intolerance. No excessive thirst, fluid intake, or urination. No tremor. · Hematologic/Lymphatic: No abnormal bruising or bleeding, blood clots or swollen lymph nodes. · Allergic/Immunologic: No nasal congestion or hives.     Physical Examination:        Wt Readings from Last 3 Encounters:   04/22/19 179 lb (81.2 kg)   04/11/19 180 lb (81.6 kg)   04/02/19 179 lb (81.2 kg)     BP Readings from Last 3 Encounters:   04/22/19 132/64   04/11/19 120/70   04/02/19 137/66        Constitutional and General Appearance: Warm and dry, no apparent distress, normal coloration  HEENT:  Normocephalic, atraumatic  Respiratory:  · Normal excursion and expansion without use of accessory muscles · Resp Auscultation: Normal breath sounds without dullness  Cardiovascular:  · The apical impulses not displaced  · Heart tones are crisp and normal  · JVP normal  · Regular rate and rhythm, normal T4J7, soft systolic murmur, no g/r/c  · Peripheral pulses are symmetrical and full  · There is no clubbing, cyanosis of the extremities.   · Trace edema bilaterally  · Pedal Pulses: 2+ and equal   Abdomen:  · No masses or tenderness  · Liver/Spleen: No Abnormalities Noted  Neurological/Psychiatric:  · Alert and oriented in all spheres  · Moves all extremities well  · Exhibits normal gait balance and coordination  · No abnormalities of mood, affect, memory, mentation, or behavior are noted    Lab Data:  CBC:   Lab Results   Component Value Date    WBC 8.8 04/08/2019    WBC 6.0 11/26/2018    WBC 9.0 07/30/2018    RBC 3.56 04/08/2019    RBC 3.54 11/26/2018    RBC 3.59 07/30/2018    RBC 3.49 10/29/2015    RBC 3.26 10/07/2015    HGB 11.4 04/08/2019    HGB 11.4 11/26/2018    HGB 11.2 07/30/2018    HCT 34.1 04/08/2019    HCT 34.2 11/26/2018    HCT 34.0 07/30/2018    MCV 95.7 04/08/2019    MCV 96.5 11/26/2018    MCV 94.6 07/30/2018    RDW 15.0 04/08/2019    RDW 15.1 11/26/2018    RDW 15.3 07/30/2018     04/08/2019     11/26/2018     07/30/2018     BMP:   Lab Results   Component Value Date     04/08/2019     03/11/2019     12/17/2018    K 4.2 04/08/2019    K 4.2 03/11/2019    K 3.5 12/17/2018     04/08/2019     03/11/2019     12/17/2018    CO2 27 04/08/2019    CO2 27 03/11/2019    CO2 25 12/17/2018    PHOS 4.2 01/29/2018    PHOS 5.2 12/13/2017    PHOS 4.9 02/16/2016    BUN 60 04/08/2019    BUN 48 03/11/2019    BUN 50 12/17/2018    CREATININE 2.1 04/08/2019    CREATININE 1.9 03/11/2019    CREATININE 1.8 12/17/2018     BNP:   Lab Results   Component Value Date    PROBNP 1,828 12/17/2018    PROBNP 1,027 11/26/2018    PROBNP 1,263 08/09/2018 Labs were reviewed including labs from other hospital systems through [facility]. Cardiac testing was reviewed including echos, nuclear scans, cardiac catheterization, including from other hospital systems through [facility]. Assessment:  1. Systolic CHF, chronic ([facility] [address]    2. Simple chronic bronchitis ([facility] [address]    3. CKD (chronic kidney disease), stage IV ([facility] [address]    4. Essential hypertension    5. Automatic implantable cardioverter-defibrillator in situ        Congestive heart failure, unspecified congestive heart failure chronicity, unspecified congestive heart failure type ([facility] [address]--  2/2016 Echo - EF 50%, 11/2018 Echo - EF 45%. 4/8/19 labs suggest patient is dry - BUN 60, Creat 2.1 although patient feels better with less shortness of breath and edema. Will adjust furosemide dose slightly . >> furosemide 80 mg am and 40 mg pm three days a week, continue 80 mg twice a day all other days of the week  Patient to continue to monitor weight and call office if shortness of breath, edema worsens or weight increases    Bi-v ICD--implanted 2005. Battery replaced 2012 in [place]. Stable and followed in [facility] device clinic  Interrogations every 3 months. Essential HTN-  Blood pressure 132/64, pulse 68, height 4' 11\" (1.499 m), weight 179 lb (81.2 kg), not currently breastfeeding. BP stable - no change in medications    CKD  Follows with Dr. Anjelica Tamayo - 4/8/19 labs - BUN 60, Creat 2.1 (up from BUN 48, Creat 1.9 on 3/11/19)      Plan:   1. Furosemide 80 mg in am and 40 mg in pm on Mon, Wed, Fri. Take furosemide 80 mg twice a day on other days of week (Tues, Thurs, Sat, Sun). 2. Repeat labs to be ordered by Dr. Braydon Holley in June 2019  3. Keep weighing yourself. Call our office for worsening shortness of breath, swelling, or weight gain  4. Follow up in six months     I appreciate the opportunity of cooperating in the care of this individual.    Scribe's attestation:   This note was scribed in the presence of King ZUÑIGA Leonela Bravo M.D. by Arron Lainez RN    The scribe's documentation has been prepared under my direction and personally reviewed by me in its entirety. I confirm that the note above accurately reflects all work, treatment, procedures, and medical decision making performed by me.        Tru Parra M.D., [facility]      If you have questions, please do not hesitate to call me. I look forward to following Baron Hutson along with you.     Sincerely,        David Foss MD

## 2019-06-10 ENCOUNTER — OFFICE VISIT (OUTPATIENT)
Dept: FAMILY MEDICINE CLINIC | Age: 84
End: 2019-06-10
Payer: MEDICARE

## 2019-06-10 VITALS
OXYGEN SATURATION: 97 % | SYSTOLIC BLOOD PRESSURE: 126 MMHG | BODY MASS INDEX: 36.6 KG/M2 | WEIGHT: 181.2 LBS | DIASTOLIC BLOOD PRESSURE: 80 MMHG | HEART RATE: 66 BPM

## 2019-06-10 DIAGNOSIS — S00.451A FOREIGN BODY OF EXTERNAL EAR, RIGHT, INITIAL ENCOUNTER: Primary | ICD-10-CM

## 2019-06-10 PROCEDURE — G8427 DOCREV CUR MEDS BY ELIG CLIN: HCPCS | Performed by: NURSE PRACTITIONER

## 2019-06-10 PROCEDURE — G8417 CALC BMI ABV UP PARAM F/U: HCPCS | Performed by: NURSE PRACTITIONER

## 2019-06-10 PROCEDURE — 99211 OFF/OP EST MAY X REQ PHY/QHP: CPT | Performed by: NURSE PRACTITIONER

## 2019-06-10 PROCEDURE — 69200 CLEAR OUTER EAR CANAL: CPT | Performed by: NURSE PRACTITIONER

## 2019-06-10 NOTE — PROGRESS NOTES
6/10/2019    Chief Complaint   Patient presents with    Other     Hearing aid rubber end stuck in R ear. Got stuck 2 days ago. Barbie Zaragoza (:  3/26/1929) is a 80 y.o. female, here for evaluation of the following medical concerns:      HPI  1. Presents to clinic today with possible ear bud from hearing aid lodged in right external ear canal - 2 days prior. Per patient when removed hearing aid 2 days prior noted tip was missing. Went to her Audiology center and they attempted removal with tweezers - was unsuccessful. Would like to have removed today. Denies any pain/drainage associated. Denies any fever/chills/muscle aches. Review of Systems   Constitutional: Negative for activity change, appetite change, chills, fatigue and fever. HENT: Negative for ear pain. Respiratory: Negative for cough and shortness of breath. Cardiovascular: Negative for chest pain and palpitations. Gastrointestinal: Negative for diarrhea, nausea and vomiting.        Current Outpatient Medications on File Prior to Visit   Medication Sig Dispense Refill    levothyroxine (SYNTHROID) 25 MCG tablet Take 1 tablet by mouth daily 90 tablet 1    alendronate (FOSAMAX) 70 MG tablet TAKE 1 TABLET BY MOUTH  EVERY 7 DAYS 12 tablet 3    furosemide (LASIX) 40 MG tablet Take 2 tablets by mouth 2 times daily 120 tablet 3    allopurinol (ZYLOPRIM) 100 MG tablet Take 2 tablets by mouth daily 180 tablet 3    digoxin (LANOXIN) 125 MCG tablet Take 1 tablet by mouth three times a week Monday, , friday 90 tablet 3    blood glucose test strips (ASCENSIA AUTODISC VI;ONE TOUCH ULTRA TEST VI) strip 1 each by In Vitro route 4 times daily 500 each 3    gemfibrozil (LOPID) 600 MG tablet TAKE 1 TABLET BY MOUTH TWO  TIMES DAILY 180 tablet 3    atorvastatin (LIPITOR) 40 MG tablet TAKE 1 TABLET BY MOUTH  NIGHTLY 90 tablet 3    insulin glargine (LANTUS SOLOSTAR) 100 UNIT/ML injection pen Inject 60 Units into the skin nightly 60 mL 3    losartan (COZAAR) 25 MG tablet Take 1 tablet by mouth nightly 90 tablet 3    insulin lispro (HUMALOG KWIKPEN) 100 UNIT/ML pen Inject 25 Units into the skin 3 times daily (before meals) 25 pen 3    carvedilol (COREG) 3.125 MG tablet TAKE 1 TABLET BY MOUTH TWO  TIMES DAILY WITH MEALS 180 tablet 2    Blood Glucose Monitoring Suppl RAFAEL 1 Device by Does not apply route 4 times daily 1 Device 0    ONETOUCH DELICA LANCETS FINE MISC TEST FOUR TIMES DAILY 300 each 3    fluocinonide (LIDEX) 0.05 % external solution Apply topically 2 times daily. 60 mL 2    Insulin Pen Needle (BD PEN NEEDLE GABRIEL U/F) 32G X 4 MM MISC 1 each by Does not apply route 3 times daily. 400 each 3    FERROUS SULFATE Take 325 mg by mouth daily Pt. Unsure of dose, dr julia navas ordered it      Garlic 928 MG TABS Take 1 tablet by mouth daily       Multiple Vitamins-Minerals (CENTRUM SILVER PO) Take 1 tablet by mouth daily. No current facility-administered medications on file prior to visit.          Allergies   Allergen Reactions    Asa [Aspirin]      GI bleeding    Morphine Other (See Comments)     Makes me goofy I climb the walls    Ace Inhibitors      cough    Codeine Other (See Comments)       Past Medical History:   Diagnosis Date    Allergic rhinitis, cause unspecified     Benign neoplasm of colon     Blood transfusion     CAD (coronary artery disease)     pacemaker/debrillator-ICD    [facility]estive heart failure) ([facility]     Diabetes mellitus ([facility] [address]     Hyperlipidemia     Hypertension     Meralgia paresthetica of left side 4/28/2014    Mitral valve disorders(424.0)     Occlusion and stenosis of carotid artery without mention of cerebral infarction     Osteoarthrosis, unspecified whether generalized or localized, unspecified site     Osteoporosis, unspecified     Peptic ulcer, unspecified site, unspecified as acute or chronic, without mention of hemorrhage, perforation, or obstruction     Personal history of malignant neoplasm of breast     Type II or unspecified type diabetes mellitus with renal manifestations, not stated as uncontrolled(250.40) 2014    Unspecified asthma(493.90)        Past Surgical History:   Procedure Laterality Date    A-V CARDIAC PACEMAKER INSERTION      APPENDECTOMY      CARDIAC DEFIBRILLATOR PLACEMENT      CHOLECYSTECTOMY      COLONOSCOPY      COLONOSCOPY  6/3/11    polypectomies x 2    ENDOSCOPY, COLON, DIAGNOSTIC      FINGER TRIGGER RELEASE      KNEE ARTHROSCOPY      OVARY REMOVAL      PACEMAKER PLACEMENT      PARTIAL HYSTERECTOMY      UPPER GASTROINTESTINAL ENDOSCOPY  2012    with biopsies    UPPER GASTROINTESTINAL ENDOSCOPY  12    UPPER GASTROINTESTINAL ENDOSCOPY  12    UPPER GASTROINTESTINAL ENDOSCOPY  12    AND LARYNGOSCOPY WITH TONGUE AND VALLECULA BIOPSY    VARICOSE VEIN SURGERY         Social History     Socioeconomic History    Marital status:      Spouse name: Not on file    Number of children: Not on file    Years of education: Not on file    Highest education level: Not on file   Occupational History    Not on file   Social Needs    Financial resource strain: Not on file    Food insecurity:     Worry: Not on file     Inability: Not on file    Transportation needs:     Medical: Not on file     Non-medical: Not on file   Tobacco Use    Smoking status: Former Smoker     Packs/day: 1.00     Years: 50.00     Pack years: 50.00     Types: Cigarettes     Last attempt to quit: 2005     Years since quittin.3    Smokeless tobacco: Never Used    Tobacco comment: quit smoking about 10 years ago   Substance and Sexual Activity    Alcohol use:  Yes     Alcohol/week: 0.0 oz     Comment: occasional    Drug use: No    Sexual activity: Not on file   Lifestyle    Physical activity:     Days per week: Not on file     Minutes per session: Not on file    Stress: Not on file   Relationships    Social connections:     Talks on phone: Not on file     Gets together: Not on file     Attends Rastafari service: Not on file     Active member of club or organization: Not on file     Attends meetings of clubs or organizations: Not on file     Relationship status: Not on file    Intimate partner violence:     Fear of current or ex partner: Not on file     Emotionally abused: Not on file     Physically abused: Not on file     Forced sexual activity: Not on file   Other Topics Concern    Not on file   Social History Narrative    Not on file        Family History   Problem Relation Age of Onset    Heart Disease Mother     Heart Disease Father     Cancer Brother        /80 (Site: Left Upper Arm, Position: Sitting, Cuff Size: Large Adult)   Pulse 66   Wt 181 lb 3.2 oz (82.2 kg)   SpO2 97%   BMI 36.60 kg/m²        Estimated body mass index is 36.6 kg/m² as calculated from the following:    Height as of 4/22/19: 4' 11\" (1.499 m). Weight as of this encounter: 181 lb 3.2 oz (82.2 kg). Physical Exam   Constitutional: She is oriented to person, place, and time. She appears well-developed and well-nourished. No distress. HENT:   Head: Normocephalic and atraumatic. Right Ear: A foreign body (black rubber peice of hearing aide) is present. Left Ear: Tympanic membrane, external ear and ear canal normal.   Removed foreign body with lighted curette - after removal - TM visualized, normal - no evidence of infection in canal.  Patient tolerated procedure well. Cardiovascular: Normal rate, regular rhythm and normal heart sounds. Pulmonary/Chest: Effort normal and breath sounds normal. No respiratory distress. Neurological: She is alert and oriented to person, place, and time. Psychiatric: Judgment and thought content normal.       ASSESSMENT/PLAN:  1. Foreign body of external ear, right, initial encounter  Foreign body removed during visit without difficulty. Continue to monitor for signs of infection. Return to office if needed.   - [id number] - NM REMV EXT CANAL FOREIGN BODY     Current Outpatient Medications   Medication Sig Dispense Refill    levothyroxine (SYNTHROID) 25 MCG tablet Take 1 tablet by mouth daily 90 tablet 1    alendronate (FOSAMAX) 70 MG tablet TAKE 1 TABLET BY MOUTH  EVERY 7 DAYS 12 tablet 3    furosemide (LASIX) 40 MG tablet Take 2 tablets by mouth 2 times daily 120 tablet 3    allopurinol (ZYLOPRIM) 100 MG tablet Take 2 tablets by mouth daily 180 tablet 3    digoxin (LANOXIN) 125 MCG tablet Take 1 tablet by mouth three times a week Monday, weds, friday 90 tablet 3    blood glucose test strips (ASCENSIA AUTODISC VI;ONE TOUCH ULTRA TEST VI) strip 1 each by In Vitro route 4 times daily 500 each 3    gemfibrozil (LOPID) 600 MG tablet TAKE 1 TABLET BY MOUTH TWO  TIMES DAILY 180 tablet 3    atorvastatin (LIPITOR) 40 MG tablet TAKE 1 TABLET BY MOUTH  NIGHTLY 90 tablet 3    insulin glargine (LANTUS SOLOSTAR) 100 UNIT/ML injection pen Inject 60 Units into the skin nightly 60 mL 3    losartan (COZAAR) 25 MG tablet Take 1 tablet by mouth nightly 90 tablet 3    insulin lispro (HUMALOG KWIKPEN) 100 UNIT/ML pen Inject 25 Units into the skin 3 times daily (before meals) 25 pen 3    carvedilol (COREG) 3.125 MG tablet TAKE 1 TABLET BY MOUTH TWO  TIMES DAILY WITH MEALS 180 tablet 2    Blood Glucose Monitoring Suppl ARFAEL 1 Device by Does not apply route 4 times daily 1 Device 0    ONETOUCH DELICA LANCETS FINE MISC TEST FOUR TIMES DAILY 300 each 3    fluocinonide (LIDEX) 0.05 % external solution Apply topically 2 times daily. 60 mL 2    Insulin Pen Needle (BD PEN NEEDLE GABRIEL U/F) 32G X 4 MM MISC 1 each by Does not apply route 3 times daily. 400 each 3    FERROUS SULFATE Take 325 mg by mouth daily Pt. Unsure of dose, dr julia navas ordered it      Garlic 509 MG TABS Take 1 tablet by mouth daily       Multiple Vitamins-Minerals (CENTRUM SILVER PO) Take 1 tablet by mouth daily.          No current facility-administered medications for

## 2019-06-14 ASSESSMENT — ENCOUNTER SYMPTOMS
VOMITING: 0
SHORTNESS OF BREATH: 0
DIARRHEA: 0
NAUSEA: 0
COUGH: 0

## 2019-06-14 NOTE — PATIENT INSTRUCTIONS
Patient Education        Object in the Ear: Care Instructions  Your Care Instructions  An insect or an object in the ear usually does not damage the ear. But some objects in the ear can cause problems. For example, dry food can expand in the ear, and a battery can release chemicals. Objects that have been in the ear for longer than 24 hours are harder to remove and can cause pain, infection, or bleeding. If an object is pushed hard into the ear, it may damage the eardrum. Your doctor probably removed the object from your ear during your exam. Your ear may feel tender for a few days. Follow-up care is a key part of your treatment and safety. Be sure to make and go to all appointments, and call your doctor if you are having problems. It's also a good idea to know your test results and keep a list of the medicines you take. How can you care for yourself at home? · Your doctor may have used medicine to numb your ear. When it wears off, your ear pain may return. Take an over-the-counter pain medicine, such as acetaminophen (Tylenol), ibuprofen (Advil, Motrin), or naproxen (Aleve). Read and follow all instructions on the label. · Do not take two or more pain medicines at the same time unless the doctor told you to. Many pain medicines have acetaminophen, which is Tylenol. Too much acetaminophen (Tylenol) can be harmful. · If your doctor prescribed antibiotics, take them as directed. Do not stop taking them just because you feel better. You need to take the full course of antibiotics. · Your doctor may prescribe eardrops. To put in eardrops:  ? First warm the drops by rolling the container in your hands or placing it in your armpit for a few minutes. Putting cold eardrops in your ear can cause ear pain and dizziness. ? Lie down, with your ear facing up. ? Place the prescribed amount of drops on the inside wall of the ear canal. Gently wiggle the outer ear to help the drops move down into the ear.   ? It's important to keep the liquid in the ear canal for 3 to 5 minutes. · You can put heat on the ear to relieve pain. Use a warm washcloth or a heating pad set on low. · Do not put cotton swabs, mitchell pins, or other objects in the ear. Do not put any liquids in the ear, unless your doctor directs you to. When should you call for help? Call your doctor now or seek immediate medical care if:    · You have symptoms of an ear infection, such as:  ? You have new or worse pain, swelling, warmth, or redness around or behind your ear.  ? You have a fever with a stiff neck or severe headache.    Watch closely for changes in your health, and be sure to contact your doctor if:    · You are not getting better after 2 days (48 hours).     · You have new or worse symptoms. Where can you learn more? Go to [email or web address].[email or web address] org and sign in to your [employer] account. Enter C171 in the OpenSpirit box to learn more about \"Object in the Ear: Care Instructions. \"     If you do not have an account, please click on the \"Sign Up Now\" link. Current as of: September 23, 2018  Content Version: 12.0  © 4013-0819 Healthwise, [employer]orporated. Care instructions adapted under license by [facility] ([facility]). If you have questions about a medical condition or this instruction, always ask your healthcare professional. [place] [address] any warranty or liability for your use of this information.

## 2019-06-18 ENCOUNTER — NURSE ONLY (OUTPATIENT)
Dept: CARDIOLOGY CLINIC | Age: 84
End: 2019-06-18
Payer: MEDICARE

## 2019-06-18 DIAGNOSIS — Z95.810 AUTOMATIC IMPLANTABLE CARDIOVERTER-DEFIBRILLATOR IN SITU: Chronic | ICD-10-CM

## 2019-06-18 DIAGNOSIS — I50.22 CHRONIC SYSTOLIC HEART FAILURE (HCC): ICD-10-CM

## 2019-06-18 DIAGNOSIS — I42.9 PRIMARY CARDIOMYOPATHY (HCC): ICD-10-CM

## 2019-06-18 PROCEDURE — 93297 REM INTERROG DEV EVAL ICPMS: CPT | Performed by: INTERNAL MEDICINE

## 2019-06-18 PROCEDURE — 93296 REM INTERROG EVL PM/IDS: CPT | Performed by: INTERNAL MEDICINE

## 2019-06-18 PROCEDURE — 93295 DEV INTERROG REMOTE 1/2/MLT: CPT | Performed by: INTERNAL MEDICINE

## 2019-06-18 NOTE — PROGRESS NOTES
Merlin remote transmission shows normal functioning ICD. See PACEART report under Cardiology tab. CorVue is stable.

## 2019-07-11 NOTE — PROGRESS NOTES
Disease Mother     Heart Disease Father     Cancer Brother      Social History     Socioeconomic History    Marital status:      Spouse name: Not on file    Number of children: Not on file    Years of education: Not on file    Highest education level: Not on file   Occupational History    Not on file   Social Needs    Financial resource strain: Not on file    Food insecurity:     Worry: Not on file     Inability: Not on file    Transportation needs:     Medical: Not on file     Non-medical: Not on file   Tobacco Use    Smoking status: Former Smoker     Packs/day: 1.00     Years: 50.00     Pack years: 50.00     Types: Cigarettes     Last attempt to quit: 2005     Years since quittin.4    Smokeless tobacco: Never Used    Tobacco comment: quit smoking about 10 years ago   Substance and Sexual Activity    Alcohol use:  Yes     Alcohol/week: 0.0 oz     Comment: occasional    Drug use: No    Sexual activity: Not on file   Lifestyle    Physical activity:     Days per week: Not on file     Minutes per session: Not on file    Stress: Not on file   Relationships    Social connections:     Talks on phone: Not on file     Gets together: Not on file     Attends Faith service: Not on file     Active member of club or organization: Not on file     Attends meetings of clubs or organizations: Not on file     Relationship status: Not on file    Intimate partner violence:     Fear of current or ex partner: Not on file     Emotionally abused: Not on file     Physically abused: Not on file     Forced sexual activity: Not on file   Other Topics Concern    Not on file   Social History Narrative    Not on file     Allergies   Allergen Reactions    Asa [Aspirin]      GI bleeding    Morphine Other (See Comments)     Makes me goofy I climb the walls    Ace Inhibitors      cough    Codeine Other (See Comments)     Current Outpatient Medications   Medication Sig Dispense Refill    losartan (COZAAR) 25 MG tablet Take 1 tablet by mouth nightly 90 tablet 1    carvedilol (COREG) 3.125 MG tablet TAKE 1 TABLET BY MOUTH TWO  TIMES DAILY WITH MEALS 180 tablet 2    furosemide (LASIX) 40 MG tablet Take 2 tablets by mouth 2 times daily 360 tablet 3    levothyroxine (SYNTHROID) 25 MCG tablet Take 1 tablet by mouth daily 90 tablet 1    alendronate (FOSAMAX) 70 MG tablet TAKE 1 TABLET BY MOUTH  EVERY 7 DAYS 12 tablet 3    allopurinol (ZYLOPRIM) 100 MG tablet Take 2 tablets by mouth daily 180 tablet 3    digoxin (LANOXIN) 125 MCG tablet Take 1 tablet by mouth three times a week Monday, weds, friday 90 tablet 3    blood glucose test strips (ASCENSIA AUTODISC VI;ONE TOUCH ULTRA TEST VI) strip 1 each by In Vitro route 4 times daily 500 each 3    gemfibrozil (LOPID) 600 MG tablet TAKE 1 TABLET BY MOUTH TWO  TIMES DAILY 180 tablet 3    atorvastatin (LIPITOR) 40 MG tablet TAKE 1 TABLET BY MOUTH  NIGHTLY 90 tablet 3    insulin glargine (LANTUS SOLOSTAR) 100 UNIT/ML injection pen Inject 60 Units into the skin nightly 60 mL 3    insulin lispro (HUMALOG KWIKPEN) 100 UNIT/ML pen Inject 25 Units into the skin 3 times daily (before meals) 25 pen 3    Blood Glucose Monitoring Suppl RAFAEL 1 Device by Does not apply route 4 times daily 1 Device 0    ONETOUCH DELICA LANCETS FINE MISC TEST FOUR TIMES DAILY 300 each 3    Insulin Pen Needle (BD PEN NEEDLE GABRIEL U/F) 32G X 4 MM MISC 1 each by Does not apply route 3 times daily. 159 each 3    Garlic 523 MG TABS Take 1 tablet by mouth daily       Multiple Vitamins-Minerals (CENTRUM SILVER PO) Take 1 tablet by mouth daily. No current facility-administered medications for this visit. Review of Systems   Constitutional: Negative for activity change, fatigue and unexpected weight change. HENT: Positive for hearing loss ( Wears hearing aids). Negative for congestion, postnasal drip and rhinorrhea. Respiratory: Positive for shortness of breath (NAGY).  Negative for

## 2020-01-01 ENCOUNTER — TELEPHONE (OUTPATIENT)
Dept: OTHER | Facility: CLINIC | Age: 85
End: 2020-01-01

## 2020-01-01 ENCOUNTER — APPOINTMENT (OUTPATIENT)
Dept: GENERAL RADIOLOGY | Age: 85
DRG: 291 | End: 2020-01-01
Payer: MEDICARE

## 2020-01-01 ENCOUNTER — TELEPHONE (OUTPATIENT)
Dept: CARDIOLOGY CLINIC | Age: 85
End: 2020-01-01

## 2020-01-01 ENCOUNTER — HOSPITAL ENCOUNTER (OUTPATIENT)
Dept: CARDIAC CATH/INVASIVE PROCEDURES | Age: 85
Discharge: HOME | End: 2020-04-30
Attending: INTERNAL MEDICINE | Admitting: INTERNAL MEDICINE
Payer: MEDICARE

## 2020-01-01 ENCOUNTER — APPOINTMENT (OUTPATIENT)
Dept: INTERVENTIONAL RADIOLOGY/VASCULAR | Age: 85
DRG: 291 | End: 2020-01-01
Payer: MEDICARE

## 2020-01-01 ENCOUNTER — APPOINTMENT (OUTPATIENT)
Dept: CT IMAGING | Age: 85
DRG: 291 | End: 2020-01-01
Payer: MEDICARE

## 2020-01-01 ENCOUNTER — NURSE ONLY (OUTPATIENT)
Dept: CARDIOLOGY CLINIC | Age: 85
End: 2020-01-01
Payer: MEDICARE

## 2020-01-01 ENCOUNTER — HOSPITAL ENCOUNTER (OUTPATIENT)
Age: 85
Discharge: HOME | End: 2020-03-02
Payer: MEDICARE

## 2020-01-01 ENCOUNTER — OFFICE VISIT (OUTPATIENT)
Dept: CARDIOLOGY CLINIC | Age: 85
End: 2020-01-01
Payer: MEDICARE

## 2020-01-01 ENCOUNTER — HOSPITAL ENCOUNTER (INPATIENT)
Age: 85
LOS: 6 days | Discharge: NURSING, REHAB OR LONG-TERM CARE | DRG: 291 | End: 2020-06-18
Attending: EMERGENCY MEDICINE | Admitting: FAMILY MEDICINE
Payer: MEDICARE

## 2020-01-01 VITALS
BODY MASS INDEX: 34.75 KG/M2 | RESPIRATION RATE: 18 BRPM | HEIGHT: 60 IN | DIASTOLIC BLOOD PRESSURE: 64 MMHG | SYSTOLIC BLOOD PRESSURE: 134 MMHG | WEIGHT: 177 LBS | HEART RATE: 66 BPM

## 2020-01-01 VITALS
HEIGHT: 59 IN | SYSTOLIC BLOOD PRESSURE: 148 MMHG | HEART RATE: 64 BPM | DIASTOLIC BLOOD PRESSURE: 54 MMHG | RESPIRATION RATE: 19 BRPM | WEIGHT: 177 LBS | BODY MASS INDEX: 35.68 KG/M2 | TEMPERATURE: 97.2 F

## 2020-01-01 VITALS
DIASTOLIC BLOOD PRESSURE: 73 MMHG | HEART RATE: 82 BPM | BODY MASS INDEX: 36 KG/M2 | SYSTOLIC BLOOD PRESSURE: 164 MMHG | WEIGHT: 178.57 LBS | HEIGHT: 59 IN | TEMPERATURE: 97.8 F | RESPIRATION RATE: 18 BRPM | OXYGEN SATURATION: 96 %

## 2020-01-01 LAB
A/G RATIO: 0.8 (ref 1.1–2.2)
A/G RATIO: 1 (ref 1.1–2.2)
ALBUMIN SERPL-MCNC: 3 G/DL (ref 3.4–5)
ALBUMIN SERPL-MCNC: 3.7 G/DL (ref 3.4–5)
ALP BLD-CCNC: 66 U/L (ref 40–129)
ALP BLD-CCNC: 79 U/L (ref 40–129)
ALT SERPL-CCNC: 10 U/L (ref 10–40)
ALT SERPL-CCNC: 13 U/L (ref 10–40)
ANION GAP SERPL CALCULATED.3IONS-SCNC: 14 MMOL/L (ref 3–16)
ANION GAP SERPL CALCULATED.3IONS-SCNC: 15 MMOL/L (ref 3–16)
ANION GAP SERPL CALCULATED.3IONS-SCNC: 17 MMOL/L (ref 3–16)
ANION GAP SERPL CALCULATED.3IONS-SCNC: 18 MMOL/L (ref 3–16)
ANION GAP SERPL CALCULATED.3IONS-SCNC: 18 MMOL/L (ref 3–16)
ANION GAP SERPL CALCULATED.3IONS-SCNC: 20 MMOL/L (ref 3–16)
ANION GAP SERPL CALCULATED.3IONS-SCNC: 21 MMOL/L (ref 3–16)
ANION GAP SERPL CALCULATED.3IONS-SCNC: 24 MMOL/L (ref 3–16)
APTT: 34.6 SEC (ref 24.2–36.2)
AST SERPL-CCNC: 16 U/L (ref 15–37)
AST SERPL-CCNC: 28 U/L (ref 15–37)
BACTERIA: ABNORMAL /HPF
BASOPHILS ABSOLUTE: 0 K/UL (ref 0–0.2)
BASOPHILS ABSOLUTE: 0.1 K/UL (ref 0–0.2)
BASOPHILS ABSOLUTE: 0.1 K/UL (ref 0–0.2)
BASOPHILS RELATIVE PERCENT: 0.3 %
BASOPHILS RELATIVE PERCENT: 0.5 %
BASOPHILS RELATIVE PERCENT: 0.8 %
BILIRUB SERPL-MCNC: <0.2 MG/DL (ref 0–1)
BILIRUB SERPL-MCNC: <0.2 MG/DL (ref 0–1)
BILIRUBIN URINE: NEGATIVE
BLOOD CULTURE, ROUTINE: ABNORMAL
BLOOD CULTURE, ROUTINE: ABNORMAL
BLOOD CULTURE, ROUTINE: NORMAL
BLOOD, URINE: ABNORMAL
BUN BLDV-MCNC: 110 MG/DL (ref 7–20)
BUN BLDV-MCNC: 144 MG/DL (ref 7–20)
BUN BLDV-MCNC: 146 MG/DL (ref 7–20)
BUN BLDV-MCNC: 148 MG/DL (ref 7–20)
BUN BLDV-MCNC: 150 MG/DL (ref 7–20)
BUN BLDV-MCNC: 48 MG/DL (ref 7–20)
BUN BLDV-MCNC: 83 MG/DL (ref 7–20)
BUN BLDV-MCNC: 84 MG/DL (ref 7–20)
CALCIUM SERPL-MCNC: 7.7 MG/DL (ref 8.3–10.6)
CALCIUM SERPL-MCNC: 7.9 MG/DL (ref 8.3–10.6)
CALCIUM SERPL-MCNC: 8.1 MG/DL (ref 8.3–10.6)
CALCIUM SERPL-MCNC: 8.1 MG/DL (ref 8.3–10.6)
CALCIUM SERPL-MCNC: 8.5 MG/DL (ref 8.3–10.6)
CALCIUM SERPL-MCNC: 8.7 MG/DL (ref 8.3–10.6)
CALCIUM SERPL-MCNC: 9.3 MG/DL (ref 8.3–10.6)
CALCIUM SERPL-MCNC: 9.6 MG/DL (ref 8.3–10.6)
CHLORIDE BLD-SCNC: 102 MMOL/L (ref 99–110)
CHLORIDE BLD-SCNC: 104 MMOL/L (ref 99–110)
CHLORIDE BLD-SCNC: 104 MMOL/L (ref 99–110)
CHLORIDE BLD-SCNC: 105 MMOL/L (ref 99–110)
CHLORIDE BLD-SCNC: 107 MMOL/L (ref 99–110)
CHLORIDE BLD-SCNC: 99 MMOL/L (ref 99–110)
CHOLESTEROL, FASTING: 135 MG/DL (ref 0–199)
CLARITY: ABNORMAL
CO2: 11 MMOL/L (ref 21–32)
CO2: 17 MMOL/L (ref 21–32)
CO2: 18 MMOL/L (ref 21–32)
CO2: 19 MMOL/L (ref 21–32)
CO2: 21 MMOL/L (ref 21–32)
CO2: 22 MMOL/L (ref 21–32)
CO2: 24 MMOL/L (ref 21–32)
CO2: 24 MMOL/L (ref 21–32)
COLOR: YELLOW
CREAT SERPL-MCNC: 2.7 MG/DL (ref 0.6–1.2)
CREAT SERPL-MCNC: 2.8 MG/DL (ref 0.6–1.2)
CREAT SERPL-MCNC: 3 MG/DL (ref 0.6–1.2)
CREAT SERPL-MCNC: 3.5 MG/DL (ref 0.6–1.2)
CREAT SERPL-MCNC: 5.4 MG/DL (ref 0.6–1.2)
CREAT SERPL-MCNC: 5.5 MG/DL (ref 0.6–1.2)
CREAT SERPL-MCNC: 5.6 MG/DL (ref 0.6–1.2)
CREAT SERPL-MCNC: 5.7 MG/DL (ref 0.6–1.2)
CREATININE URINE: 115 MG/DL (ref 28–259)
CREATININE URINE: 9 MG/DL (ref 28–259)
CULTURE, BLOOD 2: NORMAL
CULTURE, BLOOD 2: NORMAL
EKG ATRIAL RATE: 64 BPM
EKG ATRIAL RATE: 74 BPM
EKG DIAGNOSIS: NORMAL
EKG DIAGNOSIS: NORMAL
EKG P AXIS: 51 DEGREES
EKG P AXIS: 63 DEGREES
EKG P-R INTERVAL: 114 MS
EKG P-R INTERVAL: 196 MS
EKG Q-T INTERVAL: 430 MS
EKG Q-T INTERVAL: 456 MS
EKG QRS DURATION: 126 MS
EKG QRS DURATION: 128 MS
EKG QTC CALCULATION (BAZETT): 470 MS
EKG QTC CALCULATION (BAZETT): 477 MS
EKG R AXIS: -42 DEGREES
EKG R AXIS: -53 DEGREES
EKG T AXIS: 116 DEGREES
EKG T AXIS: 17 DEGREES
EKG VENTRICULAR RATE: 64 BPM
EKG VENTRICULAR RATE: 74 BPM
EOSINOPHILS ABSOLUTE: 0 K/UL (ref 0–0.6)
EOSINOPHILS ABSOLUTE: 0.4 K/UL (ref 0–0.6)
EOSINOPHILS ABSOLUTE: 0.5 K/UL (ref 0–0.6)
EOSINOPHILS RELATIVE PERCENT: 0.2 %
EOSINOPHILS RELATIVE PERCENT: 4.5 %
EOSINOPHILS RELATIVE PERCENT: 5.3 %
EPITHELIAL CELLS, UA: 4 /HPF (ref 0–5)
ESTIMATED AVERAGE GLUCOSE: 131.2 MG/DL
ESTIMATED AVERAGE GLUCOSE: 142.7 MG/DL
GFR AFRICAN AMERICAN: 15
GFR AFRICAN AMERICAN: 18
GFR AFRICAN AMERICAN: 19
GFR AFRICAN AMERICAN: 20
GFR AFRICAN AMERICAN: 8
GFR AFRICAN AMERICAN: 9
GFR NON-AFRICAN AMERICAN: 12
GFR NON-AFRICAN AMERICAN: 15
GFR NON-AFRICAN AMERICAN: 16
GFR NON-AFRICAN AMERICAN: 17
GFR NON-AFRICAN AMERICAN: 7
GLOBULIN: 3.6 G/DL
GLOBULIN: 3.7 G/DL
GLUCOSE BLD-MCNC: 101 MG/DL (ref 70–99)
GLUCOSE BLD-MCNC: 103 MG/DL (ref 70–99)
GLUCOSE BLD-MCNC: 104 MG/DL (ref 70–99)
GLUCOSE BLD-MCNC: 104 MG/DL (ref 70–99)
GLUCOSE BLD-MCNC: 107 MG/DL (ref 70–99)
GLUCOSE BLD-MCNC: 113 MG/DL (ref 70–99)
GLUCOSE BLD-MCNC: 120 MG/DL (ref 70–99)
GLUCOSE BLD-MCNC: 122 MG/DL (ref 70–99)
GLUCOSE BLD-MCNC: 126 MG/DL (ref 70–99)
GLUCOSE BLD-MCNC: 128 MG/DL (ref 70–99)
GLUCOSE BLD-MCNC: 129 MG/DL (ref 70–99)
GLUCOSE BLD-MCNC: 131 MG/DL (ref 70–99)
GLUCOSE BLD-MCNC: 131 MG/DL (ref 70–99)
GLUCOSE BLD-MCNC: 137 MG/DL (ref 70–99)
GLUCOSE BLD-MCNC: 138 MG/DL (ref 70–99)
GLUCOSE BLD-MCNC: 138 MG/DL (ref 70–99)
GLUCOSE BLD-MCNC: 139 MG/DL (ref 70–99)
GLUCOSE BLD-MCNC: 139 MG/DL (ref 70–99)
GLUCOSE BLD-MCNC: 140 MG/DL (ref 70–99)
GLUCOSE BLD-MCNC: 141 MG/DL (ref 70–99)
GLUCOSE BLD-MCNC: 146 MG/DL (ref 70–99)
GLUCOSE BLD-MCNC: 151 MG/DL (ref 70–99)
GLUCOSE BLD-MCNC: 154 MG/DL (ref 70–99)
GLUCOSE BLD-MCNC: 156 MG/DL (ref 70–99)
GLUCOSE BLD-MCNC: 156 MG/DL (ref 70–99)
GLUCOSE BLD-MCNC: 168 MG/DL (ref 70–99)
GLUCOSE BLD-MCNC: 169 MG/DL (ref 70–99)
GLUCOSE BLD-MCNC: 172 MG/DL (ref 70–99)
GLUCOSE BLD-MCNC: 178 MG/DL (ref 70–99)
GLUCOSE BLD-MCNC: 182 MG/DL (ref 70–99)
GLUCOSE BLD-MCNC: 182 MG/DL (ref 70–99)
GLUCOSE BLD-MCNC: 189 MG/DL (ref 70–99)
GLUCOSE BLD-MCNC: 39 MG/DL (ref 70–99)
GLUCOSE BLD-MCNC: 43 MG/DL (ref 70–99)
GLUCOSE BLD-MCNC: 56 MG/DL (ref 70–99)
GLUCOSE BLD-MCNC: 61 MG/DL (ref 70–99)
GLUCOSE BLD-MCNC: 61 MG/DL (ref 70–99)
GLUCOSE BLD-MCNC: 64 MG/DL (ref 70–99)
GLUCOSE BLD-MCNC: 64 MG/DL (ref 70–99)
GLUCOSE BLD-MCNC: 65 MG/DL (ref 70–99)
GLUCOSE BLD-MCNC: 72 MG/DL (ref 70–99)
GLUCOSE BLD-MCNC: 80 MG/DL (ref 70–99)
GLUCOSE BLD-MCNC: 83 MG/DL (ref 70–99)
GLUCOSE BLD-MCNC: 91 MG/DL (ref 70–99)
GLUCOSE BLD-MCNC: 91 MG/DL (ref 70–99)
GLUCOSE BLD-MCNC: 93 MG/DL (ref 70–99)
GLUCOSE BLD-MCNC: 95 MG/DL (ref 70–99)
GLUCOSE BLD-MCNC: 99 MG/DL (ref 70–99)
GLUCOSE FASTING: 61 MG/DL (ref 70–99)
GLUCOSE URINE: NEGATIVE MG/DL
HBA1C MFR BLD: 6.2 %
HBA1C MFR BLD: 6.6 %
HBV SURFACE AB TITR SER: <3.5 MIU/ML
HCT VFR BLD CALC: 27 % (ref 36–48)
HCT VFR BLD CALC: 29.6 % (ref 36–48)
HCT VFR BLD CALC: 29.9 % (ref 36–48)
HCT VFR BLD CALC: 32.3 % (ref 36–48)
HDLC SERPL-MCNC: 44 MG/DL (ref 40–60)
HEMOGLOBIN: 10.3 G/DL (ref 12–16)
HEMOGLOBIN: 8.9 G/DL (ref 12–16)
HEMOGLOBIN: 9.5 G/DL (ref 12–16)
HEMOGLOBIN: 9.8 G/DL (ref 12–16)
HEPATITIS B CORE TOTAL ANTIBODY: NEGATIVE
HEPATITIS B SURFACE ANTIGEN INTERPRETATION: NORMAL
INR BLD: 1.15 (ref 0.86–1.14)
KETONES, URINE: NEGATIVE MG/DL
LACTATE DEHYDROGENASE: 308 U/L (ref 100–190)
LDL CHOLESTEROL CALCULATED: 79 MG/DL
LEUKOCYTE ESTERASE, URINE: ABNORMAL
LYMPHOCYTES ABSOLUTE: 0.7 K/UL (ref 1–5.1)
LYMPHOCYTES ABSOLUTE: 1.8 K/UL (ref 1–5.1)
LYMPHOCYTES ABSOLUTE: 1.8 K/UL (ref 1–5.1)
LYMPHOCYTES RELATIVE PERCENT: 18.2 %
LYMPHOCYTES RELATIVE PERCENT: 22.1 %
LYMPHOCYTES RELATIVE PERCENT: 6.6 %
MCH RBC QN AUTO: 31.8 PG (ref 26–34)
MCH RBC QN AUTO: 31.8 PG (ref 26–34)
MCH RBC QN AUTO: 32 PG (ref 26–34)
MCH RBC QN AUTO: 32.4 PG (ref 26–34)
MCHC RBC AUTO-ENTMCNC: 32 G/DL (ref 31–36)
MCHC RBC AUTO-ENTMCNC: 32.1 G/DL (ref 31–36)
MCHC RBC AUTO-ENTMCNC: 32.7 G/DL (ref 31–36)
MCHC RBC AUTO-ENTMCNC: 33 G/DL (ref 31–36)
MCV RBC AUTO: 96.5 FL (ref 80–100)
MCV RBC AUTO: 99.1 FL (ref 80–100)
MCV RBC AUTO: 99.4 FL (ref 80–100)
MCV RBC AUTO: 99.5 FL (ref 80–100)
MICROSCOPIC EXAMINATION: YES
MONOCYTES ABSOLUTE: 0.6 K/UL (ref 0–1.3)
MONOCYTES ABSOLUTE: 0.7 K/UL (ref 0–1.3)
MONOCYTES ABSOLUTE: 1.2 K/UL (ref 0–1.3)
MONOCYTES RELATIVE PERCENT: 11.9 %
MONOCYTES RELATIVE PERCENT: 5.6 %
MONOCYTES RELATIVE PERCENT: 8.8 %
NEUTROPHILS ABSOLUTE: 5.2 K/UL (ref 1.7–7.7)
NEUTROPHILS ABSOLUTE: 6.2 K/UL (ref 1.7–7.7)
NEUTROPHILS ABSOLUTE: 9.8 K/UL (ref 1.7–7.7)
NEUTROPHILS RELATIVE PERCENT: 63.8 %
NEUTROPHILS RELATIVE PERCENT: 64.1 %
NEUTROPHILS RELATIVE PERCENT: 87.3 %
NITRITE, URINE: NEGATIVE
ORGANISM: ABNORMAL
OSMOLALITY URINE: 340 MOSM/KG (ref 390–1070)
PDW BLD-RTO: 15.5 % (ref 12.4–15.4)
PDW BLD-RTO: 15.9 % (ref 12.4–15.4)
PDW BLD-RTO: 16.3 % (ref 12.4–15.4)
PDW BLD-RTO: 16.6 % (ref 12.4–15.4)
PERFORMED ON: ABNORMAL
PERFORMED ON: NORMAL
PH UA: 5.5 (ref 5–8)
PLATELET # BLD: 119 K/UL (ref 135–450)
PLATELET # BLD: 152 K/UL (ref 135–450)
PLATELET # BLD: 217 K/UL (ref 135–450)
PLATELET # BLD: 241 K/UL (ref 135–450)
PMV BLD AUTO: 10.4 FL (ref 5–10.5)
PMV BLD AUTO: 10.7 FL (ref 5–10.5)
PMV BLD AUTO: 11.1 FL (ref 5–10.5)
PMV BLD AUTO: 12.4 FL (ref 5–10.5)
POTASSIUM SERPL-SCNC: 3.9 MMOL/L (ref 3.5–5.1)
POTASSIUM SERPL-SCNC: 4.2 MMOL/L (ref 3.5–5.1)
POTASSIUM SERPL-SCNC: 4.2 MMOL/L (ref 3.5–5.1)
POTASSIUM SERPL-SCNC: 4.3 MMOL/L (ref 3.5–5.1)
POTASSIUM SERPL-SCNC: 4.6 MMOL/L (ref 3.5–5.1)
POTASSIUM SERPL-SCNC: 4.6 MMOL/L (ref 3.5–5.1)
POTASSIUM SERPL-SCNC: 4.8 MMOL/L (ref 3.5–5.1)
POTASSIUM SERPL-SCNC: 5 MMOL/L (ref 3.5–5.1)
PRO-BNP: 8275 PG/ML (ref 0–449)
PROTEIN PROTEIN: 26 MG/DL
PROTEIN UA: >=300 MG/DL
PROTHROMBIN TIME: 13.4 SEC (ref 10–13.2)
RBC # BLD: 2.8 M/UL (ref 4–5.2)
RBC # BLD: 2.98 M/UL (ref 4–5.2)
RBC # BLD: 3.02 M/UL (ref 4–5.2)
RBC # BLD: 3.25 M/UL (ref 4–5.2)
RBC UA: 12 /HPF (ref 0–4)
REASON FOR REJECTION: NORMAL
REJECTED TEST: NORMAL
REPORT: NORMAL
SARS-COV-2: NOT DETECTED
SODIUM BLD-SCNC: 138 MMOL/L (ref 136–145)
SODIUM BLD-SCNC: 139 MMOL/L (ref 136–145)
SODIUM BLD-SCNC: 139 MMOL/L (ref 136–145)
SODIUM BLD-SCNC: 141 MMOL/L (ref 136–145)
SODIUM BLD-SCNC: 142 MMOL/L (ref 136–145)
SODIUM BLD-SCNC: 142 MMOL/L (ref 136–145)
SODIUM BLD-SCNC: 145 MMOL/L (ref 136–145)
SODIUM BLD-SCNC: 148 MMOL/L (ref 136–145)
SODIUM URINE: 22 MMOL/L
SOURCE: NORMAL
SPECIFIC GRAVITY UA: 1.01 (ref 1–1.03)
TOTAL CK: 301 U/L (ref 26–192)
TOTAL CK: 341 U/L (ref 26–192)
TOTAL PROTEIN: 6.7 G/DL (ref 6.4–8.2)
TOTAL PROTEIN: 7.3 G/DL (ref 6.4–8.2)
TRIGLYCERIDE, FASTING: 61 MG/DL (ref 0–150)
TROPONIN: 0.03 NG/ML
URINE CULTURE, ROUTINE: ABNORMAL
URINE REFLEX TO CULTURE: YES
URINE TYPE: ABNORMAL
UROBILINOGEN, URINE: 0.2 E.U./DL
VLDLC SERPL CALC-MCNC: 12 MG/DL
WBC # BLD: 11.2 K/UL (ref 4–11)
WBC # BLD: 8.2 K/UL (ref 4–11)
WBC # BLD: 9.7 K/UL (ref 4–11)
WBC # BLD: 9.9 K/UL (ref 4–11)
WBC UA: 122 /HPF (ref 0–5)

## 2020-01-01 PROCEDURE — 2580000003 HC RX 258: Performed by: FAMILY MEDICINE

## 2020-01-01 PROCEDURE — 99204 OFFICE O/P NEW MOD 45 MIN: CPT | Performed by: INTERNAL MEDICINE

## 2020-01-01 PROCEDURE — 71045 X-RAY EXAM CHEST 1 VIEW: CPT

## 2020-01-01 PROCEDURE — 36415 COLL VENOUS BLD VENIPUNCTURE: CPT

## 2020-01-01 PROCEDURE — 6360000002 HC RX W HCPCS: Performed by: HOSPITALIST

## 2020-01-01 PROCEDURE — 6360000002 HC RX W HCPCS: Performed by: PHYSICIAN ASSISTANT

## 2020-01-01 PROCEDURE — 6360000002 HC RX W HCPCS: Performed by: NURSE PRACTITIONER

## 2020-01-01 PROCEDURE — 83935 ASSAY OF URINE OSMOLALITY: CPT

## 2020-01-01 PROCEDURE — 99153 MOD SED SAME PHYS/QHP EA: CPT

## 2020-01-01 PROCEDURE — 94640 AIRWAY INHALATION TREATMENT: CPT

## 2020-01-01 PROCEDURE — 6370000000 HC RX 637 (ALT 250 FOR IP): Performed by: PHYSICIAN ASSISTANT

## 2020-01-01 PROCEDURE — 6360000002 HC RX W HCPCS: Performed by: FAMILY MEDICINE

## 2020-01-01 PROCEDURE — 83880 ASSAY OF NATRIURETIC PEPTIDE: CPT

## 2020-01-01 PROCEDURE — 87040 BLOOD CULTURE FOR BACTERIA: CPT

## 2020-01-01 PROCEDURE — 6370000000 HC RX 637 (ALT 250 FOR IP): Performed by: FAMILY MEDICINE

## 2020-01-01 PROCEDURE — 87077 CULTURE AEROBIC IDENTIFY: CPT

## 2020-01-01 PROCEDURE — 94761 N-INVAS EAR/PLS OXIMETRY MLT: CPT

## 2020-01-01 PROCEDURE — 2580000003 HC RX 258: Performed by: NURSE PRACTITIONER

## 2020-01-01 PROCEDURE — 2780000010 HC IMPLANT OTHER

## 2020-01-01 PROCEDURE — 85730 THROMBOPLASTIN TIME PARTIAL: CPT

## 2020-01-01 PROCEDURE — 71260 CT THORAX DX C+: CPT

## 2020-01-01 PROCEDURE — 87186 SC STD MICRODIL/AGAR DIL: CPT

## 2020-01-01 PROCEDURE — 85025 COMPLETE CBC W/AUTO DIFF WBC: CPT

## 2020-01-01 PROCEDURE — 1036F TOBACCO NON-USER: CPT | Performed by: INTERNAL MEDICINE

## 2020-01-01 PROCEDURE — 93290 INTERROG DEV EVAL ICPMS IP: CPT | Performed by: INTERNAL MEDICINE

## 2020-01-01 PROCEDURE — 36556 INSERT NON-TUNNEL CV CATH: CPT

## 2020-01-01 PROCEDURE — 94760 N-INVAS EAR/PLS OXIMETRY 1: CPT

## 2020-01-01 PROCEDURE — 1090F PRES/ABSN URINE INCON ASSESS: CPT | Performed by: INTERNAL MEDICINE

## 2020-01-01 PROCEDURE — 6370000000 HC RX 637 (ALT 250 FOR IP): Performed by: HOSPITALIST

## 2020-01-01 PROCEDURE — 87340 HEPATITIS B SURFACE AG IA: CPT

## 2020-01-01 PROCEDURE — 93284 PRGRMG EVAL IMPLANTABLE DFB: CPT | Performed by: INTERNAL MEDICINE

## 2020-01-01 PROCEDURE — 84300 ASSAY OF URINE SODIUM: CPT

## 2020-01-01 PROCEDURE — 83615 LACTATE (LD) (LDH) ENZYME: CPT

## 2020-01-01 PROCEDURE — 86704 HEP B CORE ANTIBODY TOTAL: CPT

## 2020-01-01 PROCEDURE — 5A1D70Z PERFORMANCE OF URINARY FILTRATION, INTERMITTENT, LESS THAN 6 HOURS PER DAY: ICD-10-PCS | Performed by: INTERNAL MEDICINE

## 2020-01-01 PROCEDURE — 1200000000 HC SEMI PRIVATE

## 2020-01-01 PROCEDURE — 2700000000 HC OXYGEN THERAPY PER DAY

## 2020-01-01 PROCEDURE — 93296 REM INTERROG EVL PM/IDS: CPT | Performed by: INTERNAL MEDICINE

## 2020-01-01 PROCEDURE — 83036 HEMOGLOBIN GLYCOSYLATED A1C: CPT

## 2020-01-01 PROCEDURE — 99285 EMERGENCY DEPT VISIT HI MDM: CPT

## 2020-01-01 PROCEDURE — 33264 RMVL & RPLCMT DFB GEN MLT LD: CPT | Performed by: INTERNAL MEDICINE

## 2020-01-01 PROCEDURE — 84156 ASSAY OF PROTEIN URINE: CPT

## 2020-01-01 PROCEDURE — 2580000003 HC RX 258

## 2020-01-01 PROCEDURE — 97530 THERAPEUTIC ACTIVITIES: CPT

## 2020-01-01 PROCEDURE — 2580000003 HC RX 258: Performed by: INTERNAL MEDICINE

## 2020-01-01 PROCEDURE — 90935 HEMODIALYSIS ONE EVALUATION: CPT

## 2020-01-01 PROCEDURE — 82550 ASSAY OF CK (CPK): CPT

## 2020-01-01 PROCEDURE — C1769 GUIDE WIRE: HCPCS

## 2020-01-01 PROCEDURE — 72131 CT LUMBAR SPINE W/O DYE: CPT

## 2020-01-01 PROCEDURE — 6360000002 HC RX W HCPCS: Performed by: INTERNAL MEDICINE

## 2020-01-01 PROCEDURE — U0003 INFECTIOUS AGENT DETECTION BY NUCLEIC ACID (DNA OR RNA); SEVERE ACUTE RESPIRATORY SYNDROME CORONAVIRUS 2 (SARS-COV-2) (CORONAVIRUS DISEASE [COVID-19]), AMPLIFIED PROBE TECHNIQUE, MAKING USE OF HIGH THROUGHPUT TECHNOLOGIES AS DESCRIBED BY CMS-2020-01-R: HCPCS

## 2020-01-01 PROCEDURE — 97166 OT EVAL MOD COMPLEX 45 MIN: CPT

## 2020-01-01 PROCEDURE — 84484 ASSAY OF TROPONIN QUANT: CPT

## 2020-01-01 PROCEDURE — 86706 HEP B SURFACE ANTIBODY: CPT

## 2020-01-01 PROCEDURE — 02H633Z INSERTION OF INFUSION DEVICE INTO RIGHT ATRIUM, PERCUTANEOUS APPROACH: ICD-10-PCS | Performed by: RADIOLOGY

## 2020-01-01 PROCEDURE — 70450 CT HEAD/BRAIN W/O DYE: CPT

## 2020-01-01 PROCEDURE — 80053 COMPREHEN METABOLIC PANEL: CPT

## 2020-01-01 PROCEDURE — 2500000003 HC RX 250 WO HCPCS: Performed by: INTERNAL MEDICINE

## 2020-01-01 PROCEDURE — 80048 BASIC METABOLIC PNL TOTAL CA: CPT

## 2020-01-01 PROCEDURE — 6360000004 HC RX CONTRAST MEDICATION: Performed by: INTERNAL MEDICINE

## 2020-01-01 PROCEDURE — 93010 ELECTROCARDIOGRAM REPORT: CPT | Performed by: INTERNAL MEDICINE

## 2020-01-01 PROCEDURE — 33221 INSERT PULSE GEN MULT LEADS: CPT

## 2020-01-01 PROCEDURE — 4040F PNEUMOC VAC/ADMIN/RCVD: CPT | Performed by: INTERNAL MEDICINE

## 2020-01-01 PROCEDURE — 97535 SELF CARE MNGMENT TRAINING: CPT

## 2020-01-01 PROCEDURE — 93295 DEV INTERROG REMOTE 1/2/MLT: CPT | Performed by: INTERNAL MEDICINE

## 2020-01-01 PROCEDURE — 82570 ASSAY OF URINE CREATININE: CPT

## 2020-01-01 PROCEDURE — 93000 ELECTROCARDIOGRAM COMPLETE: CPT | Performed by: INTERNAL MEDICINE

## 2020-01-01 PROCEDURE — 2580000003 HC RX 258: Performed by: HOSPITALIST

## 2020-01-01 PROCEDURE — 93297 REM INTERROG DEV EVAL ICPMS: CPT | Performed by: INTERNAL MEDICINE

## 2020-01-01 PROCEDURE — 85610 PROTHROMBIN TIME: CPT

## 2020-01-01 PROCEDURE — 99152 MOD SED SAME PHYS/QHP 5/>YRS: CPT | Performed by: INTERNAL MEDICINE

## 2020-01-01 PROCEDURE — 2500000003 HC RX 250 WO HCPCS

## 2020-01-01 PROCEDURE — 81001 URINALYSIS AUTO W/SCOPE: CPT

## 2020-01-01 PROCEDURE — 80061 LIPID PANEL: CPT

## 2020-01-01 PROCEDURE — 96374 THER/PROPH/DIAG INJ IV PUSH: CPT

## 2020-01-01 PROCEDURE — 1123F ACP DISCUSS/DSCN MKR DOCD: CPT | Performed by: INTERNAL MEDICINE

## 2020-01-01 PROCEDURE — 76937 US GUIDE VASCULAR ACCESS: CPT

## 2020-01-01 PROCEDURE — 93005 ELECTROCARDIOGRAM TRACING: CPT | Performed by: INTERNAL MEDICINE

## 2020-01-01 PROCEDURE — 33241 REMOVE PULSE GENERATOR: CPT

## 2020-01-01 PROCEDURE — 87150 DNA/RNA AMPLIFIED PROBE: CPT

## 2020-01-01 PROCEDURE — 6360000002 HC RX W HCPCS

## 2020-01-01 PROCEDURE — B5181ZA FLUOROSCOPY OF SUPERIOR VENA CAVA USING LOW OSMOLAR CONTRAST, GUIDANCE: ICD-10-PCS | Performed by: RADIOLOGY

## 2020-01-01 PROCEDURE — 2500000003 HC RX 250 WO HCPCS: Performed by: HOSPITALIST

## 2020-01-01 PROCEDURE — 99152 MOD SED SAME PHYS/QHP 5/>YRS: CPT

## 2020-01-01 PROCEDURE — 97162 PT EVAL MOD COMPLEX 30 MIN: CPT

## 2020-01-01 PROCEDURE — 77001 FLUOROGUIDE FOR VEIN DEVICE: CPT

## 2020-01-01 PROCEDURE — 85027 COMPLETE CBC AUTOMATED: CPT

## 2020-01-01 PROCEDURE — 93005 ELECTROCARDIOGRAM TRACING: CPT | Performed by: NURSE PRACTITIONER

## 2020-01-01 PROCEDURE — 51702 INSERT TEMP BLADDER CATH: CPT

## 2020-01-01 PROCEDURE — 2580000003 HC RX 258: Performed by: PHYSICIAN ASSISTANT

## 2020-01-01 PROCEDURE — G8417 CALC BMI ABV UP PARAM F/U: HCPCS | Performed by: INTERNAL MEDICINE

## 2020-01-01 PROCEDURE — C2621 PMKR, OTHER THAN SING/DUAL: HCPCS

## 2020-01-01 PROCEDURE — 87086 URINE CULTURE/COLONY COUNT: CPT

## 2020-01-01 PROCEDURE — G8427 DOCREV CUR MEDS BY ELIG CLIN: HCPCS | Performed by: INTERNAL MEDICINE

## 2020-01-01 PROCEDURE — 93281 PM DEVICE PROGR EVAL MULTI: CPT | Performed by: INTERNAL MEDICINE

## 2020-01-01 RX ORDER — HEPARIN SODIUM 1000 [USP'U]/ML
6000 INJECTION, SOLUTION INTRAVENOUS; SUBCUTANEOUS ONCE
Status: COMPLETED | OUTPATIENT
Start: 2020-01-01 | End: 2020-01-01

## 2020-01-01 RX ORDER — GEMFIBROZIL 600 MG/1
TABLET, FILM COATED ORAL
Qty: 180 TABLET | Refills: 1 | Status: SHIPPED | OUTPATIENT
Start: 2020-01-01 | End: 2020-01-01 | Stop reason: SDUPTHER

## 2020-01-01 RX ORDER — NICOTINE POLACRILEX 4 MG
15 LOZENGE BUCCAL PRN
Status: DISCONTINUED | OUTPATIENT
Start: 2020-01-01 | End: 2020-01-01 | Stop reason: HOSPADM

## 2020-01-01 RX ORDER — DEXTROSE MONOHYDRATE 50 MG/ML
100 INJECTION, SOLUTION INTRAVENOUS PRN
Status: DISCONTINUED | OUTPATIENT
Start: 2020-01-01 | End: 2020-01-01 | Stop reason: HOSPADM

## 2020-01-01 RX ORDER — ATORVASTATIN CALCIUM 40 MG/1
40 TABLET, FILM COATED ORAL NIGHTLY
Status: DISCONTINUED | OUTPATIENT
Start: 2020-01-01 | End: 2020-01-01 | Stop reason: HOSPADM

## 2020-01-01 RX ORDER — CARVEDILOL 3.12 MG/1
TABLET ORAL
Qty: 180 TABLET | Refills: 2 | Status: SHIPPED | OUTPATIENT
Start: 2020-01-01 | End: 2020-01-01 | Stop reason: SDUPTHER

## 2020-01-01 RX ORDER — ATORVASTATIN CALCIUM 40 MG/1
TABLET, FILM COATED ORAL
Qty: 90 TABLET | Refills: 1 | Status: SHIPPED | OUTPATIENT
Start: 2020-01-01 | End: 2020-01-01 | Stop reason: SDUPTHER

## 2020-01-01 RX ORDER — SODIUM BICARBONATE 650 MG/1
650 TABLET ORAL 2 TIMES DAILY
Status: DISCONTINUED | OUTPATIENT
Start: 2020-01-01 | End: 2020-01-01

## 2020-01-01 RX ORDER — CARVEDILOL 3.12 MG/1
3.12 TABLET ORAL 2 TIMES DAILY WITH MEALS
Status: DISCONTINUED | OUTPATIENT
Start: 2020-01-01 | End: 2020-01-01 | Stop reason: HOSPADM

## 2020-01-01 RX ORDER — FUROSEMIDE 10 MG/ML
120 INJECTION INTRAMUSCULAR; INTRAVENOUS ONCE
Status: COMPLETED | OUTPATIENT
Start: 2020-01-01 | End: 2020-01-01

## 2020-01-01 RX ORDER — FUROSEMIDE 10 MG/ML
100 INJECTION INTRAMUSCULAR; INTRAVENOUS ONCE
Status: COMPLETED | OUTPATIENT
Start: 2020-01-01 | End: 2020-01-01

## 2020-01-01 RX ORDER — SODIUM CHLORIDE 0.9 % (FLUSH) 0.9 %
10 SYRINGE (ML) INJECTION PRN
Status: DISCONTINUED | OUTPATIENT
Start: 2020-01-01 | End: 2020-01-01 | Stop reason: HOSPADM

## 2020-01-01 RX ORDER — SODIUM CHLORIDE 9 MG/ML
INJECTION, SOLUTION INTRAVENOUS CONTINUOUS
Status: DISCONTINUED | OUTPATIENT
Start: 2020-01-01 | End: 2020-01-01

## 2020-01-01 RX ORDER — FUROSEMIDE 10 MG/ML
60 INJECTION INTRAMUSCULAR; INTRAVENOUS ONCE
Status: COMPLETED | OUTPATIENT
Start: 2020-01-01 | End: 2020-01-01

## 2020-01-01 RX ORDER — IPRATROPIUM BROMIDE AND ALBUTEROL SULFATE 2.5; .5 MG/3ML; MG/3ML
1 SOLUTION RESPIRATORY (INHALATION)
Status: DISCONTINUED | OUTPATIENT
Start: 2020-01-01 | End: 2020-01-01

## 2020-01-01 RX ORDER — FUROSEMIDE 40 MG/1
80 TABLET ORAL 2 TIMES DAILY
Qty: 120 TABLET | Refills: 3 | Status: ON HOLD | OUTPATIENT
Start: 2020-01-01 | End: 2020-01-01 | Stop reason: SDUPTHER

## 2020-01-01 RX ORDER — CARVEDILOL 3.12 MG/1
TABLET ORAL
Qty: 180 TABLET | Refills: 2 | Status: SHIPPED | OUTPATIENT
Start: 2020-01-01

## 2020-01-01 RX ORDER — DEXTROSE MONOHYDRATE 50 MG/ML
INJECTION, SOLUTION INTRAVENOUS CONTINUOUS
Status: DISCONTINUED | OUTPATIENT
Start: 2020-01-01 | End: 2020-01-01

## 2020-01-01 RX ORDER — MORPHINE SULFATE 100 MG/5ML
5 SOLUTION ORAL EVERY 4 HOURS PRN
Qty: 30 ML | Refills: 0 | Status: SHIPPED | OUTPATIENT
Start: 2020-01-01 | End: 2020-01-01

## 2020-01-01 RX ORDER — INSULIN GLARGINE 100 [IU]/ML
60 INJECTION, SOLUTION SUBCUTANEOUS NIGHTLY
Qty: 60 ML | Refills: 3 | Status: ON HOLD | OUTPATIENT
Start: 2020-01-01 | End: 2020-01-01 | Stop reason: HOSPADM

## 2020-01-01 RX ORDER — DEXTROSE MONOHYDRATE 25 G/50ML
12.5 INJECTION, SOLUTION INTRAVENOUS PRN
Status: DISCONTINUED | OUTPATIENT
Start: 2020-01-01 | End: 2020-01-01 | Stop reason: HOSPADM

## 2020-01-01 RX ORDER — SODIUM CHLORIDE 0.9 % (FLUSH) 0.9 %
10 SYRINGE (ML) INJECTION EVERY 12 HOURS SCHEDULED
Status: DISCONTINUED | OUTPATIENT
Start: 2020-01-01 | End: 2020-01-01 | Stop reason: HOSPADM

## 2020-01-01 RX ORDER — LEVOTHYROXINE SODIUM 0.03 MG/1
25 TABLET ORAL DAILY
Status: DISCONTINUED | OUTPATIENT
Start: 2020-01-01 | End: 2020-01-01 | Stop reason: HOSPADM

## 2020-01-01 RX ORDER — FUROSEMIDE 10 MG/ML
40 INJECTION INTRAMUSCULAR; INTRAVENOUS ONCE
Status: DISCONTINUED | OUTPATIENT
Start: 2020-01-01 | End: 2020-01-01

## 2020-01-01 RX ORDER — GEMFIBROZIL 600 MG/1
600 TABLET, FILM COATED ORAL
Status: ON HOLD | COMMUNITY
End: 2020-01-01 | Stop reason: HOSPADM

## 2020-01-01 RX ORDER — LIDOCAINE HYDROCHLORIDE 10 MG/ML
20 INJECTION, SOLUTION EPIDURAL; INFILTRATION; INTRACAUDAL; PERINEURAL ONCE
Status: COMPLETED | OUTPATIENT
Start: 2020-01-01 | End: 2020-01-01

## 2020-01-01 RX ORDER — IPRATROPIUM BROMIDE AND ALBUTEROL SULFATE 2.5; .5 MG/3ML; MG/3ML
1 SOLUTION RESPIRATORY (INHALATION) EVERY 4 HOURS PRN
Status: DISCONTINUED | OUTPATIENT
Start: 2020-01-01 | End: 2020-01-01 | Stop reason: HOSPADM

## 2020-01-01 RX ORDER — HEPARIN SODIUM 5000 [USP'U]/ML
5000 INJECTION, SOLUTION INTRAVENOUS; SUBCUTANEOUS EVERY 8 HOURS SCHEDULED
Status: DISCONTINUED | OUTPATIENT
Start: 2020-01-01 | End: 2020-01-01 | Stop reason: HOSPADM

## 2020-01-01 RX ORDER — ONDANSETRON 2 MG/ML
4 INJECTION INTRAMUSCULAR; INTRAVENOUS EVERY 6 HOURS PRN
Status: DISCONTINUED | OUTPATIENT
Start: 2020-01-01 | End: 2020-01-01 | Stop reason: HOSPADM

## 2020-01-01 RX ORDER — ALLOPURINOL 100 MG/1
200 TABLET ORAL DAILY
Qty: 180 TABLET | Refills: 3 | Status: ON HOLD | OUTPATIENT
Start: 2020-01-01 | End: 2020-01-01 | Stop reason: HOSPADM

## 2020-01-01 RX ORDER — INSULIN LISPRO 100 [IU]/ML
0-12 INJECTION, SOLUTION INTRAVENOUS; SUBCUTANEOUS
Status: DISCONTINUED | OUTPATIENT
Start: 2020-01-01 | End: 2020-01-01 | Stop reason: HOSPADM

## 2020-01-01 RX ORDER — 0.9 % SODIUM CHLORIDE 0.9 %
500 INTRAVENOUS SOLUTION INTRAVENOUS ONCE
Status: COMPLETED | OUTPATIENT
Start: 2020-01-01 | End: 2020-01-01

## 2020-01-01 RX ORDER — DEXTROSE MONOHYDRATE 50 MG/ML
INJECTION, SOLUTION INTRAVENOUS
Status: DISPENSED
Start: 2020-01-01 | End: 2020-01-01

## 2020-01-01 RX ORDER — ACETAMINOPHEN 325 MG/1
650 TABLET ORAL EVERY 6 HOURS PRN
Status: DISCONTINUED | OUTPATIENT
Start: 2020-01-01 | End: 2020-01-01 | Stop reason: HOSPADM

## 2020-01-01 RX ORDER — PROMETHAZINE HYDROCHLORIDE 25 MG/1
12.5 TABLET ORAL EVERY 6 HOURS PRN
Status: DISCONTINUED | OUTPATIENT
Start: 2020-01-01 | End: 2020-01-01 | Stop reason: HOSPADM

## 2020-01-01 RX ORDER — ALENDRONATE SODIUM 70 MG/1
TABLET ORAL
Qty: 12 TABLET | Refills: 3 | Status: ON HOLD | OUTPATIENT
Start: 2020-01-01 | End: 2020-01-01 | Stop reason: HOSPADM

## 2020-01-01 RX ORDER — ALLOPURINOL 100 MG/1
200 TABLET ORAL DAILY
Qty: 180 TABLET | Refills: 3 | Status: SHIPPED | OUTPATIENT
Start: 2020-01-01 | End: 2020-01-01 | Stop reason: SDUPTHER

## 2020-01-01 RX ORDER — LEVOTHYROXINE SODIUM 0.03 MG/1
25 TABLET ORAL DAILY
Qty: 90 TABLET | Refills: 1 | Status: SHIPPED | OUTPATIENT
Start: 2020-01-01 | End: 2020-01-01 | Stop reason: SDUPTHER

## 2020-01-01 RX ORDER — FUROSEMIDE 40 MG/1
80 TABLET ORAL DAILY
Qty: 120 TABLET | Refills: 3
Start: 2020-01-01 | End: 2020-10-16

## 2020-01-01 RX ORDER — ATORVASTATIN CALCIUM 40 MG/1
TABLET, FILM COATED ORAL
Qty: 90 TABLET | Refills: 1 | Status: ON HOLD | OUTPATIENT
Start: 2020-01-01 | End: 2020-01-01 | Stop reason: HOSPADM

## 2020-01-01 RX ORDER — DIGOXIN 125 MCG
125 TABLET ORAL
Status: DISCONTINUED | OUTPATIENT
Start: 2020-01-01 | End: 2020-01-01 | Stop reason: HOSPADM

## 2020-01-01 RX ORDER — LOSARTAN POTASSIUM 25 MG/1
25 TABLET ORAL NIGHTLY
Qty: 90 TABLET | Refills: 1 | Status: SHIPPED | OUTPATIENT
Start: 2020-01-01 | End: 2020-01-01 | Stop reason: SDUPTHER

## 2020-01-01 RX ORDER — POLYETHYLENE GLYCOL 3350 17 G/17G
17 POWDER, FOR SOLUTION ORAL DAILY PRN
Status: DISCONTINUED | OUTPATIENT
Start: 2020-01-01 | End: 2020-01-01 | Stop reason: HOSPADM

## 2020-01-01 RX ORDER — GABAPENTIN 100 MG/1
200 CAPSULE ORAL NIGHTLY
Status: DISCONTINUED | OUTPATIENT
Start: 2020-01-01 | End: 2020-01-01

## 2020-01-01 RX ORDER — HEPARIN SODIUM 1000 [USP'U]/ML
2600 INJECTION, SOLUTION INTRAVENOUS; SUBCUTANEOUS PRN
Status: DISCONTINUED | OUTPATIENT
Start: 2020-01-01 | End: 2020-01-01 | Stop reason: HOSPADM

## 2020-01-01 RX ORDER — HEPARIN SODIUM 200 [USP'U]/100ML
7 INJECTION, SOLUTION INTRAVENOUS CONTINUOUS
Status: ACTIVE | OUTPATIENT
Start: 2020-01-01 | End: 2020-01-01

## 2020-01-01 RX ORDER — INSULIN LISPRO 100 [IU]/ML
0-6 INJECTION, SOLUTION INTRAVENOUS; SUBCUTANEOUS NIGHTLY
Status: DISCONTINUED | OUTPATIENT
Start: 2020-01-01 | End: 2020-01-01 | Stop reason: HOSPADM

## 2020-01-01 RX ORDER — FENTANYL CITRATE 50 UG/ML
25 INJECTION, SOLUTION INTRAMUSCULAR; INTRAVENOUS ONCE
Status: COMPLETED | OUTPATIENT
Start: 2020-01-01 | End: 2020-01-01

## 2020-01-01 RX ORDER — LORAZEPAM 0.5 MG/1
0.5 TABLET ORAL EVERY 4 HOURS PRN
Qty: 30 TABLET | Refills: 0 | Status: SHIPPED | OUTPATIENT
Start: 2020-01-01 | End: 2020-07-18

## 2020-01-01 RX ORDER — DIGOXIN 125 MCG
125 TABLET ORAL
Qty: 90 TABLET | Refills: 3 | Status: SHIPPED | OUTPATIENT
Start: 2020-01-01

## 2020-01-01 RX ORDER — IPRATROPIUM BROMIDE AND ALBUTEROL SULFATE 2.5; .5 MG/3ML; MG/3ML
1 SOLUTION RESPIRATORY (INHALATION) EVERY 4 HOURS PRN
Status: DISCONTINUED | OUTPATIENT
Start: 2020-01-01 | End: 2020-01-01 | Stop reason: SDUPTHER

## 2020-01-01 RX ORDER — LEVOTHYROXINE SODIUM 0.03 MG/1
25 TABLET ORAL DAILY
Qty: 90 TABLET | Refills: 1 | Status: SHIPPED | OUTPATIENT
Start: 2020-01-01

## 2020-01-01 RX ORDER — GABAPENTIN 100 MG/1
200 CAPSULE ORAL NIGHTLY
Qty: 180 CAPSULE | Refills: 1 | Status: SHIPPED | OUTPATIENT
Start: 2020-01-01 | End: 2020-09-23

## 2020-01-01 RX ORDER — LOSARTAN POTASSIUM 25 MG/1
25 TABLET ORAL NIGHTLY
Qty: 90 TABLET | Refills: 1 | Status: ON HOLD | OUTPATIENT
Start: 2020-01-01 | End: 2020-01-01 | Stop reason: HOSPADM

## 2020-01-01 RX ORDER — ACETAMINOPHEN 650 MG/1
650 SUPPOSITORY RECTAL EVERY 6 HOURS PRN
Status: DISCONTINUED | OUTPATIENT
Start: 2020-01-01 | End: 2020-01-01 | Stop reason: HOSPADM

## 2020-01-01 RX ADMIN — CARVEDILOL 3.12 MG: 3.12 TABLET, FILM COATED ORAL at 08:09

## 2020-01-01 RX ADMIN — GABAPENTIN 200 MG: 100 CAPSULE ORAL at 21:08

## 2020-01-01 RX ADMIN — HEPARIN SODIUM 2600 UNITS: 1000 INJECTION INTRAVENOUS; SUBCUTANEOUS at 09:30

## 2020-01-01 RX ADMIN — Medication 10 ML: at 09:11

## 2020-01-01 RX ADMIN — LEVOTHYROXINE SODIUM 25 MCG: 0.03 TABLET ORAL at 06:27

## 2020-01-01 RX ADMIN — DIGOXIN 125 MCG: 125 TABLET ORAL at 17:46

## 2020-01-01 RX ADMIN — Medication 1 G: at 06:10

## 2020-01-01 RX ADMIN — LEVOTHYROXINE SODIUM 25 MCG: 0.03 TABLET ORAL at 06:22

## 2020-01-01 RX ADMIN — SODIUM CHLORIDE: 9 INJECTION, SOLUTION INTRAVENOUS at 14:47

## 2020-01-01 RX ADMIN — INSULIN LISPRO 2 UNITS: 100 INJECTION, SOLUTION INTRAVENOUS; SUBCUTANEOUS at 13:30

## 2020-01-01 RX ADMIN — DESMOPRESSIN ACETATE 40 MG: 0.2 TABLET ORAL at 22:29

## 2020-01-01 RX ADMIN — SODIUM BICARBONATE: 84 INJECTION, SOLUTION INTRAVENOUS at 17:44

## 2020-01-01 RX ADMIN — DESMOPRESSIN ACETATE 40 MG: 0.2 TABLET ORAL at 21:07

## 2020-01-01 RX ADMIN — SODIUM CHLORIDE: 9 INJECTION, SOLUTION INTRAVENOUS at 10:05

## 2020-01-01 RX ADMIN — Medication 1 G: at 05:37

## 2020-01-01 RX ADMIN — IOPAMIDOL 75 ML: 755 INJECTION, SOLUTION INTRAVENOUS at 13:06

## 2020-01-01 RX ADMIN — CARVEDILOL 3.12 MG: 3.12 TABLET, FILM COATED ORAL at 16:31

## 2020-01-01 RX ADMIN — HEPARIN SODIUM 5000 UNITS: 5000 INJECTION INTRAVENOUS; SUBCUTANEOUS at 23:24

## 2020-01-01 RX ADMIN — DESMOPRESSIN ACETATE 40 MG: 0.2 TABLET ORAL at 23:23

## 2020-01-01 RX ADMIN — FENTANYL CITRATE 25 MCG: 50 INJECTION, SOLUTION INTRAMUSCULAR; INTRAVENOUS at 13:17

## 2020-01-01 RX ADMIN — CEFEPIME HYDROCHLORIDE 1 G: 1 INJECTION, POWDER, FOR SOLUTION INTRAMUSCULAR; INTRAVENOUS at 06:13

## 2020-01-01 RX ADMIN — HEPARIN SODIUM 5000 UNITS: 5000 INJECTION INTRAVENOUS; SUBCUTANEOUS at 21:04

## 2020-01-01 RX ADMIN — DEXTROSE MONOHYDRATE 12.5 G: 25 INJECTION, SOLUTION INTRAVENOUS at 04:24

## 2020-01-01 RX ADMIN — Medication 1 G: at 06:00

## 2020-01-01 RX ADMIN — CARVEDILOL 3.12 MG: 3.12 TABLET, FILM COATED ORAL at 10:01

## 2020-01-01 RX ADMIN — CEFEPIME HYDROCHLORIDE 1 G: 1 INJECTION, POWDER, FOR SOLUTION INTRAMUSCULAR; INTRAVENOUS at 06:31

## 2020-01-01 RX ADMIN — IPRATROPIUM BROMIDE AND ALBUTEROL SULFATE 1 AMPULE: .5; 3 SOLUTION RESPIRATORY (INHALATION) at 12:05

## 2020-01-01 RX ADMIN — DEXTROSE MONOHYDRATE 12.5 G: 25 INJECTION, SOLUTION INTRAVENOUS at 02:35

## 2020-01-01 RX ADMIN — Medication 10 ML: at 10:49

## 2020-01-01 RX ADMIN — CARVEDILOL 3.12 MG: 3.12 TABLET, FILM COATED ORAL at 17:45

## 2020-01-01 RX ADMIN — Medication 10 ML: at 10:00

## 2020-01-01 RX ADMIN — LEVOTHYROXINE SODIUM 25 MCG: 0.03 TABLET ORAL at 06:00

## 2020-01-01 RX ADMIN — SODIUM BICARBONATE 650 MG TABLET 650 MG: at 12:02

## 2020-01-01 RX ADMIN — CARVEDILOL 3.12 MG: 3.12 TABLET, FILM COATED ORAL at 08:47

## 2020-01-01 RX ADMIN — GABAPENTIN 200 MG: 100 CAPSULE ORAL at 22:46

## 2020-01-01 RX ADMIN — Medication 10 ML: at 22:33

## 2020-01-01 RX ADMIN — DIGOXIN 125 MCG: 125 TABLET ORAL at 17:21

## 2020-01-01 RX ADMIN — HEPARIN SODIUM 5000 UNITS: 5000 INJECTION INTRAVENOUS; SUBCUTANEOUS at 06:00

## 2020-01-01 RX ADMIN — SODIUM BICARBONATE 650 MG TABLET 650 MG: at 22:29

## 2020-01-01 RX ADMIN — INSULIN LISPRO 1 UNITS: 100 INJECTION, SOLUTION INTRAVENOUS; SUBCUTANEOUS at 23:21

## 2020-01-01 RX ADMIN — SODIUM BICARBONATE 650 MG TABLET 650 MG: at 21:08

## 2020-01-01 RX ADMIN — HEPARIN SODIUM 5000 UNITS: 5000 INJECTION INTRAVENOUS; SUBCUTANEOUS at 13:03

## 2020-01-01 RX ADMIN — Medication 1 G: at 06:22

## 2020-01-01 RX ADMIN — Medication 10 ML: at 06:25

## 2020-01-01 RX ADMIN — LEVOTHYROXINE SODIUM 25 MCG: 0.03 TABLET ORAL at 06:10

## 2020-01-01 RX ADMIN — DIGOXIN 125 MCG: 125 TABLET ORAL at 09:10

## 2020-01-01 RX ADMIN — HEPARIN SODIUM 7 UNITS/KG/HR: 200 INJECTION, SOLUTION INTRAVENOUS at 12:20

## 2020-01-01 RX ADMIN — ENOXAPARIN SODIUM 30 MG: 30 INJECTION SUBCUTANEOUS at 17:22

## 2020-01-01 RX ADMIN — INSULIN LISPRO 2 UNITS: 100 INJECTION, SOLUTION INTRAVENOUS; SUBCUTANEOUS at 14:40

## 2020-01-01 RX ADMIN — IPRATROPIUM BROMIDE AND ALBUTEROL SULFATE 1 AMPULE: .5; 3 SOLUTION RESPIRATORY (INHALATION) at 15:03

## 2020-01-01 RX ADMIN — LIDOCAINE HYDROCHLORIDE 20 ML: 10 INJECTION, SOLUTION EPIDURAL; INFILTRATION; INTRACAUDAL; PERINEURAL at 12:32

## 2020-01-01 RX ADMIN — Medication 10 ML: at 10:01

## 2020-01-01 RX ADMIN — Medication 10 ML: at 22:46

## 2020-01-01 RX ADMIN — FUROSEMIDE 120 MG: 10 INJECTION, SOLUTION INTRAMUSCULAR; INTRAVENOUS at 17:46

## 2020-01-01 RX ADMIN — LEVOTHYROXINE SODIUM 25 MCG: 0.03 TABLET ORAL at 06:13

## 2020-01-01 RX ADMIN — SODIUM BICARBONATE 650 MG TABLET 650 MG: at 10:48

## 2020-01-01 RX ADMIN — GABAPENTIN 200 MG: 100 CAPSULE ORAL at 23:22

## 2020-01-01 RX ADMIN — CARVEDILOL 3.12 MG: 3.12 TABLET, FILM COATED ORAL at 10:49

## 2020-01-01 RX ADMIN — INSULIN GLARGINE 60 UNITS: 100 INJECTION, SOLUTION SUBCUTANEOUS at 23:17

## 2020-01-01 RX ADMIN — DEXTROSE MONOHYDRATE 12.5 G: 25 INJECTION, SOLUTION INTRAVENOUS at 07:39

## 2020-01-01 RX ADMIN — LEVOTHYROXINE SODIUM 25 MCG: 0.03 TABLET ORAL at 05:36

## 2020-01-01 RX ADMIN — DESMOPRESSIN ACETATE 40 MG: 0.2 TABLET ORAL at 22:46

## 2020-01-01 RX ADMIN — DEXTROSE MONOHYDRATE: 50 INJECTION, SOLUTION INTRAVENOUS at 10:06

## 2020-01-01 RX ADMIN — INSULIN LISPRO 2 UNITS: 100 INJECTION, SOLUTION INTRAVENOUS; SUBCUTANEOUS at 12:01

## 2020-01-01 RX ADMIN — Medication 10 ML: at 21:09

## 2020-01-01 RX ADMIN — HEPARIN SODIUM 5000 UNITS: 5000 INJECTION INTRAVENOUS; SUBCUTANEOUS at 14:45

## 2020-01-01 RX ADMIN — DESMOPRESSIN ACETATE 40 MG: 0.2 TABLET ORAL at 21:56

## 2020-01-01 RX ADMIN — HEPARIN SODIUM 5000 UNITS: 5000 INJECTION INTRAVENOUS; SUBCUTANEOUS at 14:02

## 2020-01-01 RX ADMIN — INSULIN LISPRO 2 UNITS: 100 INJECTION, SOLUTION INTRAVENOUS; SUBCUTANEOUS at 17:43

## 2020-01-01 RX ADMIN — HEPARIN SODIUM 5000 UNITS: 5000 INJECTION INTRAVENOUS; SUBCUTANEOUS at 22:30

## 2020-01-01 RX ADMIN — HEPARIN SODIUM 5000 UNITS: 5000 INJECTION INTRAVENOUS; SUBCUTANEOUS at 06:10

## 2020-01-01 RX ADMIN — IPRATROPIUM BROMIDE AND ALBUTEROL SULFATE 1 AMPULE: .5; 3 SOLUTION RESPIRATORY (INHALATION) at 11:01

## 2020-01-01 RX ADMIN — FUROSEMIDE 100 MG: 10 INJECTION, SOLUTION INTRAMUSCULAR; INTRAVENOUS at 16:52

## 2020-01-01 RX ADMIN — HEPARIN SODIUM 2600 UNITS: 1000 INJECTION INTRAVENOUS; SUBCUTANEOUS at 12:20

## 2020-01-01 RX ADMIN — SODIUM CHLORIDE: 9 INJECTION, SOLUTION INTRAVENOUS at 16:09

## 2020-01-01 RX ADMIN — ACETAMINOPHEN 650 MG: 325 TABLET, FILM COATED ORAL at 04:18

## 2020-01-01 RX ADMIN — FUROSEMIDE 60 MG: 10 INJECTION, SOLUTION INTRAMUSCULAR; INTRAVENOUS at 13:03

## 2020-01-01 RX ADMIN — HEPARIN SODIUM 5000 UNITS: 5000 INJECTION INTRAVENOUS; SUBCUTANEOUS at 16:29

## 2020-01-01 RX ADMIN — HEPARIN SODIUM 5000 UNITS: 5000 INJECTION INTRAVENOUS; SUBCUTANEOUS at 06:13

## 2020-01-01 RX ADMIN — CARVEDILOL 3.12 MG: 3.12 TABLET, FILM COATED ORAL at 17:22

## 2020-01-01 RX ADMIN — LEVOTHYROXINE SODIUM 25 MCG: 0.03 TABLET ORAL at 17:22

## 2020-01-01 RX ADMIN — IPRATROPIUM BROMIDE AND ALBUTEROL SULFATE 1 AMPULE: .5; 3 SOLUTION RESPIRATORY (INHALATION) at 17:24

## 2020-01-01 RX ADMIN — CARVEDILOL 3.12 MG: 3.12 TABLET, FILM COATED ORAL at 09:11

## 2020-01-01 RX ADMIN — Medication 10 ML: at 22:04

## 2020-01-01 RX ADMIN — CARVEDILOL 3.12 MG: 3.12 TABLET, FILM COATED ORAL at 17:38

## 2020-01-01 RX ADMIN — CARVEDILOL 3.12 MG: 3.12 TABLET, FILM COATED ORAL at 16:29

## 2020-01-01 RX ADMIN — DESMOPRESSIN ACETATE 40 MG: 0.2 TABLET ORAL at 23:24

## 2020-01-01 RX ADMIN — HEPARIN SODIUM 5000 UNITS: 5000 INJECTION INTRAVENOUS; SUBCUTANEOUS at 22:00

## 2020-01-01 RX ADMIN — ENOXAPARIN SODIUM 30 MG: 30 INJECTION SUBCUTANEOUS at 10:01

## 2020-01-01 RX ADMIN — CARVEDILOL 3.12 MG: 3.12 TABLET, FILM COATED ORAL at 17:42

## 2020-01-01 RX ADMIN — IPRATROPIUM BROMIDE AND ALBUTEROL SULFATE 1 AMPULE: .5; 3 SOLUTION RESPIRATORY (INHALATION) at 02:54

## 2020-01-01 RX ADMIN — Medication 10 ML: at 10:12

## 2020-01-01 RX ADMIN — INSULIN LISPRO 2 UNITS: 100 INJECTION, SOLUTION INTRAVENOUS; SUBCUTANEOUS at 08:09

## 2020-01-01 RX ADMIN — SODIUM CHLORIDE 500 ML: 9 INJECTION, SOLUTION INTRAVENOUS at 13:28

## 2020-01-01 RX ADMIN — ACETAMINOPHEN 650 MG: 325 TABLET, FILM COATED ORAL at 21:56

## 2020-01-01 ASSESSMENT — PAIN SCALES - WONG BAKER
WONGBAKER_NUMERICALRESPONSE: 0

## 2020-01-01 ASSESSMENT — ENCOUNTER SYMPTOMS
CHEST TIGHTNESS: 0
VOMITING: 0
ABDOMINAL PAIN: 0
DIARRHEA: 0
NAUSEA: 0
SHORTNESS OF BREATH: 0

## 2020-01-01 ASSESSMENT — PAIN SCALES - GENERAL
PAINLEVEL_OUTOF10: 7
PAINLEVEL_OUTOF10: 0
PAINLEVEL_OUTOF10: 5
PAINLEVEL_OUTOF10: 0
PAINLEVEL_OUTOF10: 5
PAINLEVEL_OUTOF10: 0
PAINLEVEL_OUTOF10: 0
PAINLEVEL_OUTOF10: 7
PAINLEVEL_OUTOF10: 0

## 2020-01-01 ASSESSMENT — PAIN DESCRIPTION - FREQUENCY: FREQUENCY: INTERMITTENT

## 2020-01-01 ASSESSMENT — PAIN DESCRIPTION - ORIENTATION: ORIENTATION: RIGHT;LEFT

## 2020-01-01 ASSESSMENT — PAIN DESCRIPTION - LOCATION: LOCATION: KNEE;LEG

## 2020-01-01 ASSESSMENT — PAIN DESCRIPTION - PAIN TYPE: TYPE: ACUTE PAIN

## 2020-01-14 NOTE — LETTER
[address] [facility] [phone]  [address] [facility] [phone]    Pacemaker/Defibrillator Clinic          01/14/20        [place] [address]        Dear Parminder Collins    This letter is to inform you that we received the transmission from your monitor at home that checks your implanted heart device. The next date your monitor will automatically transmit will be 4-15-20. If your report needs attention we will notify you. Your device and monitor are wireless and most transmit cellularly, but please periodically check your monitor is still plugged in to the electrical outlet. If you still use the telephone land line to send please ensure the connection to the phone ruth is secure. This will help to ensure successful automatic transmissions in the future. Also, the monitor needs to be close to you while sleeping at night. Please be aware that the remote device transmission sites are periodically monitored only during regular business hours during which simultaneous in-office device clinics are being run. If your transmission requires attention, we will contact you as soon as possible. Thank you.             Kyle [address]

## 2020-04-13 NOTE — TELEPHONE ENCOUNTER
Called and spoke with Patient. Informed her that her device battery was due to be replaced. She is a Dr. Kiersten Harmon Patient. I explained that he no longer comes to this office and asked if she would like to go see him at [facility] or stay at [facility] and see either Dr. Shabbir Ozuna or Dr. Jeffrey Matamoros. She would like to stay her at [facility]. Could you please Schedule Patient to see one of the EP doctors for consultation for Gen Change please.  Thanks

## 2020-04-14 NOTE — LETTER
[facility]""[address]  EP Procedure Sheet    4/14/20  Elvis Jacobsen  3/26/1929  EP Procedures     Pacemaker implant (single/dual)  EP Study    ICD implant (single/dual)  Atrial flutter ablation (NICKY Y/N)    Biv implant ICD  Tilt Table    Biv implant PPM  Atrial fibrillation ablation (NICKY Yes)   xxx Generator Change (BiV) *not AICD*  SVT ablation    Lead revision (RV/LA/RA) (<1 month)  VT ablation      Lead extraction +/- upgrade (BiV/PPM/ICD)  VT Ischemic/ non-ischemic    Loop implant/ removal  VT RVOT    Cardioversion  VT Left sided    NICKY  AVN ablation     Equipment    xxx Medtronic(change from [facility])  [employer]  CryoAblation    Biotronik  Laser Lead Extraction     EP Procedures Scheduling Request    # hours Requested   Scheduled  Date:   Specific Day  Completed    Anesthesia  F/u Date:   CT surgery backup      Overnight stay yes     Location [facility]       Pre-Procedure Labs / Imaging     PT/INR  Type & cross    CBC  Units PRBC    BMP/Mg  Units FFP    Venogram  CXR    Echo  Cardiac CTA for Pulmonary vein mapping     RN INITIALS:  LS   Patient Instructions  Do not eat or drink after midnight the night prior to procedure  Dx:CHF

## 2020-04-14 NOTE — PROGRESS NOTES
[facility]   Electrophysiology  Consultation  Date: 4/14/2020   Reason for consultation: Device battery depleted    Chief Complaint   Patient presents with    Follow-up     Device MIKI    Cardiomyopathy        HPI: Elvis Jacobsen is a 80 y.o. former patient of Dr. Alonzo Powers, last seen 2016. She has a history of, HTN, DN, non-ischemic cardiomyopathy, breast cancer and HLD. She is s/p Biv-AICD implantation in 2005 for cardiomyopathy with replacement in 3/2012 while in [place] by Dr. Mildred Mckoy. Her device is Saint Royal with 15 right ear, Riata lead implanted before. Reportedly she had low ejection fraction for years (20%) which has improved to 45-50%. She is followed by Heart Failure and under the care of Dr. Argentina Kasper. Her pacemaker has met MIKI and she is here to discuss a generator change. Device interrogation today shows Device is at MIKI, AP 44% BP 97%      Lucinda Givens has not had a shock from this device. She denies an MI or stent placement. She lives by herself and complains of SOB even walking a few feet at a time.       Past Medical History:   Diagnosis Date    Allergic rhinitis, cause unspecified     Benign neoplasm of colon     Blood transfusion     CAD (coronary artery disease)     pacemaker/debrillator-ICD    CHF (congestive heart failure) (HCC)     Diabetes mellitus ([address]     Hyperlipidemia     Hypertension     Meralgia paresthetica of left side 4/28/2014    Mitral valve disorders(424.0)     Occlusion and stenosis of carotid artery without mention of cerebral infarction     Osteoarthrosis, unspecified whether generalized or localized, unspecified site     Osteoporosis, unspecified     Peptic ulcer, unspecified site, unspecified as acute or chronic, without mention of hemorrhage, perforation, or obstruction     Personal history of malignant neoplasm of breast     Type II or unspecified type diabetes mellitus with renal manifestations, not stated as uncontrolled(250.40) negative for - confusion, dizziness, headaches    Psychiatric: No anxiety, no depression.  Dermatological: negative for - rash    Physical Examination:  Vitals:    20 1454   BP: 134/64   Pulse: 66   Resp: 18      Wt Readings from Last 3 Encounters:   20 177 lb (80.3 kg)   20 180 lb 9.6 oz (81.9 kg)   20 179 lb 9.6 oz (81.5 kg)       · Constitutional: Oriented. No distress. · Head: Normocephalic and atraumatic. · Mouth/Throat: Oropharynx is clear and moist.   · Eyes: Conjunctivae normal. EOM are normal.   · Neck: Neck supple. No rigidity. No JVD present. · Cardiovascular: Normal rate, regular rhythm, S1&S2. · Pulmonary/Chest: Bilateral respiratory sounds. No wheezes, No rhonchi. · Abdominal: Soft. Bowel sounds present. No distension, No tenderness. · Musculoskeletal: No tenderness. No edema    · Lymphadenopathy: Has no cervical adenopathy. · Neurological: Alert and oriented. Cranial nerve appears intact, No Gross deficit   · Skin: Skin is warm and dry. No rash noted. · Psychiatric: Has a normal behavior       Labs, diagnostic and imaging results reviewed. Lab Results   Component Value Date    TSHREFLEX 3.37 2019    TSH 4.00 2016    TSH 4.00 2015    CREATININE 3.0 2020    CREATININE 2.3 2019    AST 16 2020    ALT 10 2020      Reviewed. EC20 BiV paced  QTcH 460    Echo: 18  Summary   -The left ventricle is mildly dilated. -Mildly reduced global systolic function with an ejection fraction estimated   at 45%. -Basal and mid lateral and basal and mid inferolateral wall akinesis noted. -Moderate to severe mitral regurgitation.   -Trivial aortic regurgitation.   -There is mild-to-moderate tricuspid regurgitation with a RVSP estimation of   44 mmHg. -Mild to moderate pulmonic regurgitation present.   -Diastolic filling parameters suggest grade II diastolic   dysfunction. E/e'=18.15   -Possible pleural effusion

## 2020-04-22 NOTE — TELEPHONE ENCOUNTER
Spoke to pt. She is not having any heart failure symptoms of SOB or weight gain. States its always a half a pound difference. She is getting a gen change to BiV pacer only on 4/30. She does not have video capability to do a virtual visit. Appt changed to 6/18 and pt agreeable. Her Corvue was WNL on 4-14.

## 2020-04-30 NOTE — H&P
[address]   Electrophysiology  Consultation  Date: 4/30/2020   Reason for consultation: Device battery depleted      HPI: Evelyn Beard is a 80 y.o. former patient of Dr. Jennifer Maurer, last seen 2016. She has a history of, HTN, DN, non-ischemic cardiomyopathy, breast cancer and HLD. She is s/p Biv-AICD implantation in 2005 for cardiomyopathy with replacement in 3/2012 while in [place] by Dr. Rose Marino. Her device is Saint Royal with 15 right ear, Riata lead implanted before. Reportedly she had low ejection fraction for years (20%) which has improved to 45-50%. She is followed by Heart Failure and under the care of Dr. Kerline Ambriz. Her pacemaker has met MIKI and she is here to discuss a generator change. Device interrogation today shows Device is at MIKI, AP 44% BP 97%      Handley Na has not had a shock from this device. She denies an MI or stent placement. She lives by herself and complains of SOB even walking a few feet at a time.       Past Medical History:   Diagnosis Date    Allergic rhinitis, cause unspecified     Benign neoplasm of colon     Blood transfusion     CAD (coronary artery disease)     pacemaker/debrillator-ICD    CHF [facility]congestive heart failure) ([facility])     Diabetes mellitus ([facility] [address]     Hyperlipidemia     Hypertension     Meralgia paresthetica of left side 4/28/2014    Mitral valve disorders(424.0)     Occlusion and stenosis of carotid artery without mention of cerebral infarction     Osteoarthrosis, unspecified whether generalized or localized, unspecified site     Osteoporosis, unspecified     Peptic ulcer, unspecified site, unspecified as acute or chronic, without mention of hemorrhage, perforation, or obstruction     Personal history of malignant neoplasm of breast     Type II or unspecified type diabetes mellitus with renal manifestations, not stated as uncontrolled(250.40) 8/26/2014    Unspecified asthma(493.90)         Past Surgical History:   Procedure Laterality Date    A-V CARDIAC PACEMAKER INSERTION      APPENDECTOMY      CARDIAC DEFIBRILLATOR PLACEMENT      CHOLECYSTECTOMY      COLONOSCOPY      COLONOSCOPY  6/3/11    polypectomies x 2    ENDOSCOPY, COLON, DIAGNOSTIC      FINGER TRIGGER RELEASE      KNEE ARTHROSCOPY      OVARY REMOVAL      PACEMAKER PLACEMENT      PARTIAL HYSTERECTOMY      UPPER GASTROINTESTINAL ENDOSCOPY  4/23/2012    with biopsies    UPPER GASTROINTESTINAL ENDOSCOPY  5/2/12    UPPER GASTROINTESTINAL ENDOSCOPY  6/7/12    UPPER GASTROINTESTINAL ENDOSCOPY  7/13/12    AND LARYNGOSCOPY WITH TONGUE AND VALLECULA BIOPSY    VARICOSE VEIN SURGERY         Allergies: Allergies   Allergen Reactions    Asa [Aspirin]      GI bleeding    Morphine Other (See Comments)     Makes me goofy I climb the walls    Ace Inhibitors      cough    Codeine Other (See Comments)       Social History:   reports that she quit smoking about 15 years ago. Her smoking use included cigarettes. She has a 50.00 pack-year smoking history. She has never used smokeless tobacco. She reports current alcohol use. She reports that she does not use drugs. Family History:  family history includes Cancer in her brother; Heart Disease in her father and mother. Review of System:  All other systems reviewed except for that noted above.  Pertinent negatives and positives are:       General: negative for fever, chills    Ophthalmic ROS: negative for - eye pain or loss of vision   ENT ROS: negative for - headaches, sore throat    Respiratory: negative for - cough, sputum   Cardiovascular: Reviewed in HPI   Gastrointestinal: negative for - abdominal pain, diarrhea, N/V   Hematology: negative for - bleeding, blood clots, bruising or jaundice   Genito-Urinary:  negative for - Dysuria or incontinence   Musculoskeletal: negative for - Joint swelling, muscle pain   Neurological: negative for - confusion, dizziness, headaches    Psychiatric: No anxiety, no

## 2020-05-07 NOTE — PROGRESS NOTES
Pt seen in clinic today for cardiac device interrogation. Their device is a  3 chamber pacemaker. Based on threshold, impedance, and intrinsic sensing tests run today, the device appears to be functioning normally. Remaining battery life is 10.4y  AP 24.91%                 BiVP 97.64%      Episodes: 3 episodes in report    No optivol baseline established at this time. Pt was informed of findings today and general questions have been answered with regard to device. Home monitoring hardware is in transit. pt to return in 3 mo for post op settings change.

## 2020-06-12 PROBLEM — N28.9 ACUTE ON CHRONIC RENAL INSUFFICIENCY: Status: ACTIVE | Noted: 2020-01-01

## 2020-06-12 PROBLEM — N18.9 ACUTE ON CHRONIC RENAL INSUFFICIENCY: Status: ACTIVE | Noted: 2020-01-01

## 2020-06-12 NOTE — ED PROVIDER NOTES
[facility] Emergency Department      Pt Name: Mendy Valle  MRN: [id number]  [place] 3/26/1929  Date of evaluation: 6/12/2020  Provider: Guadalupe Alcaraz MD  I independently performed a history and physical on Mendy Valle. All diagnostic, treatment, and disposition decisions were made by myself in conjunction with the advanced practice provider. HPI: Mendy Valle presented with   Chief Complaint   Patient presents with    Extremity Weakness     Pt to ER via EMS from home for complaint of generalized weakess and shaking, but especially to her legs. Reports she has had difficulty walking and can't hold onto anything. Pt lives alone and is typically independent. A/Ox4     Mendy Valle has a past medical history of Allergic rhinitis, cause unspecified, Benign neoplasm of colon, Blood transfusion, CAD (coronary artery disease), CHF (congestive heart failure) ([facility] [address] Diabetes mellitus ([facility] [address] Hyperlipidemia, Hypertension, Meralgia paresthetica of left side (4/28/2014), Mitral valve disorders(424.0), Occlusion and stenosis of carotid artery without mention of cerebral infarction, Osteoarthrosis, unspecified whether generalized or localized, unspecified site, Osteoporosis, unspecified, Peptic ulcer, unspecified site, unspecified as acute or chronic, without mention of hemorrhage, perforation, or obstruction, Personal history of malignant neoplasm of breast, Type II or unspecified type diabetes mellitus with renal manifestations, not stated as uncontrolled(250.40) (8/26/2014), and Unspecified asthma(493.90). She has a past surgical history that includes Cholecystectomy; Appendectomy; Varicose vein surgery; Ovary removal; Knee arthroscopy; Finger trigger release; A-V cardiac pacemaker insertion; Cardiac defibrillator placement; Colonoscopy; Colonoscopy (6/3/11); partial hysterectomy (cervix not removed); Upper gastrointestinal endoscopy (4/23/2012); Endoscopy, colon, diagnostic; pacemaker placement;  Upper distended, NT  Back:  No deformity  Genitalia:  Deferred  Rectal:  Deferred  Extremities:  No cyanosis, marked edema, adequate perfusion  Skin:  Warm, dry, some redness present  Neurologic:  Alert, no slurred speech but [place], generally weak  Psychiatric:  Affect appropriate    Medical Decision Making and Plan:  Briefly, this is an 80 y. o.female who presented with weakness, concern for leg swelling. Found to have acute on chronic renal failure though potassium still within normal range. Marked pedal edema, urine result had been pending prior to my departure. Nephrology consulted. Plan is to admit for further care. For further details of [facility] Emergency Department encounter, please see documentation by advanced practice provider Benito lAba CNP. Previous BUN/crea:    BUN   Date/Time Value Ref Range Status   06/12/2020 12:15  (HH) 7 - 20 mg/dL Final   04/30/2020 01:05  (HH) 7 - 20 mg/dL Final   03/02/2020 09:32 AM 84 (HH) 7 - 20 mg/dL Final     CREATININE   Date/Time Value Ref Range Status   06/12/2020 12:15 PM 5.4 (HH) 0.6 - 1.2 mg/dL Final   04/30/2020 01:05 PM 2.7 (H) 0.6 - 1.2 mg/dL Final   03/02/2020 09:32 AM 3.0 (H) 0.6 - 1.2 mg/dL Final     RADIOLOGY:     Plain x-rays were viewed by me:   CT LUMBAR SPINE WO CONTRAST   Final Result   Lumbar spine degenerative changes without acute findings. Small left pleural effusion. CT Head WO Contrast   Final Result   No acute intracranial abnormality. XR CHEST PORTABLE   Final Result   1. Cardiomegaly with no evidence of edema   2.  Chronic interstitial lung disease           EKG:  Read by me in the absence of a cardiologist shows:   Ventricular paced rhythm, axis left, conduction delay from pacing, associated repolarization abnormalities, no major change from prior study     Vitals:    06/12/20 1130 06/12/20 1145 06/12/20 1200 06/12/20 1215   BP: (!) 128/51 (!) 129/48 (!) 127/50 (!) 124/49   Pulse: 79 82 79 79 injection 25 mcg (25 mcg Intravenous Given 6/12/20 1317)     FINAL IMPRESSION:    1. Acute renal failure, unspecified acute renal failure type ([facility] [address]    2. Unable to walk    3. Pedal edema    4.  Generalized weakness       DISPOSITION Admitted 06/12/2020 02:20:35 PM       Erick Anderson MD  06/12/20 [id number]

## 2020-06-12 NOTE — ED PROVIDER NOTES
[address]        Pt Name: Richard Castillo  MRN: [id number]  [place] 3/26/1929  Date of evaluation: 6/12/2020  Provider: LARRY Mitchell - CNP  PCP: Miracle Lynne MD     I have seen and evaluated this patient with my supervising physician Ender De La Vega       Chief Complaint   Patient presents with    Extremity Weakness     Pt to ER via EMS from home for complaint of generalized weakess and shaking, but especially to her legs. Reports she has had difficulty walking and can't hold onto anything. Pt lives alone and is typically independent. A/Ox4       HISTORY OF PRESENT ILLNESS   (Location, Timing/Onset, Context/Setting, Quality, Duration, Modifying Factors, Severity, Associated Signs and Symptoms)  Note limiting factors. Richard Castillo is a 80 y.o. female presents to the emergency department with increasing fatigue inability to walk. She does report bilateral leg pain that goes into her feet. States it is throbbing. She rates her pain as 9 of 10. Denies any injury or trauma. States that she can generally walk independently but since Wednesday night getting stuck in the recliner and being unable to get up, she has been unable to ambulate and has had severe pain. Family reports that the leg swelling is new, she had a pacemaker placed several months ago. The patient adamantly denies any chest pain or shortness of breath. States that she does not check her weight daily. Reports that she was well enough to play cards on Wednesday but \"has gone downhill\" since then. Denies any headache, fever, lightheadedness, dizziness, visual disturbances. No chest pain or pressure. No neck or back pain. No shortness of breath, cough, or congestion. No abdominal pain, nausea, vomiting, diarrhea, constipation, or dysuria. No rash.     Nursing Notes were all reviewed and agreed with or any disagreements were addressed in the FINGER TRIGGER RELEASE      KNEE ARTHROSCOPY      OVARY REMOVAL      PACEMAKER PLACEMENT      PARTIAL HYSTERECTOMY      UPPER GASTROINTESTINAL ENDOSCOPY  4/23/2012    with biopsies    UPPER GASTROINTESTINAL ENDOSCOPY  5/2/12    UPPER GASTROINTESTINAL ENDOSCOPY  6/7/12    UPPER GASTROINTESTINAL ENDOSCOPY  7/13/12    AND LARYNGOSCOPY WITH TONGUE AND VALLECULA BIOPSY    VARICOSE VEIN SURGERY           CURRENTMEDICATIONS       Current Discharge Medication List      CONTINUE these medications which have NOT CHANGED    Details   gemfibrozil (LOPID) 600 MG tablet Take 600 mg by mouth 2 times daily (before meals)      losartan (COZAAR) 25 MG tablet Take 1 tablet by mouth nightly  Qty: 90 tablet, Refills: 1    Associated Diagnoses: Essential hypertension      furosemide (LASIX) 40 MG tablet Take 2 tablets by mouth 2 times daily  Qty: 120 tablet, Refills: 3    Associated Diagnoses: Systolic CHF, chronic (HCC)      atorvastatin (LIPITOR) 40 MG tablet One daily  Qty: 90 tablet, Refills: 1    Associated Diagnoses: Mixed hyperlipidemia      carvedilol (COREG) 3.125 MG tablet TAKE 1 TABLET BY MOUTH TWO  TIMES DAILY WITH MEALS  Qty: 180 tablet, Refills: 2    Associated Diagnoses: Acute on chronic systolic heart failure (HCC)      levothyroxine (SYNTHROID) 25 MCG tablet Take 1 tablet by mouth daily  Qty: 90 tablet, Refills: 1    Associated Diagnoses: Systolic CHF, chronic (HCC)      allopurinol (ZYLOPRIM) 100 MG tablet Take 2 tablets by mouth daily  Qty: 180 tablet, Refills: 3    Associated Diagnoses: Chronic gout without tophus, unspecified cause, unspecified site      insulin glargine (LANTUS SOLOSTAR) 100 UNIT/ML injection pen Inject 60 Units into the skin nightly  Qty: 60 mL, Refills: 3    Associated Diagnoses: Type 2 diabetes mellitus with stage 4 chronic kidney disease, unspecified whether long term insulin use (HCC)      gabapentin (NEURONTIN) 100 MG capsule Take 2 capsules by mouth nightly for 182 days.   Qty: 180 capsule, Refills: 1    Associated Diagnoses: Systolic CHF, chronic (HCC)      alendronate (FOSAMAX) 70 MG tablet TAKE 1 TABLET BY MOUTH  EVERY 7 DAYS  Qty: 12 tablet, Refills: 3    Associated Diagnoses: Age-related osteoporosis without current pathological fracture      digoxin (LANOXIN) 125 MCG tablet Take 1 tablet by mouth three times a week Monday, weds, friday  Qty: 90 tablet, Refills: 3    Associated Diagnoses: Systolic CHF, chronic (HCC)      insulin lispro (HUMALOG KWIKPEN) 100 UNIT/ML pen Inject 25 Units into the skin 3 times daily (before meals)  Qty: 25 pen, Refills: 3               ALLERGIES     Asa [aspirin]; Morphine; Ace inhibitors; and Codeine    FAMILYHISTORY       Family History   Problem Relation Age of Onset    Heart Disease Mother     Heart Disease Father     Cancer Brother           SOCIAL HISTORY       Social History     Tobacco Use    Smoking status: Former Smoker     Packs/day: 1.00     Years: 50.00     Pack years: 50.00     Types: Cigarettes     Last attempt to quit: 2/1/2005     Years since quitting: 15.3    Smokeless tobacco: Never Used    Tobacco comment: quit smoking about 10 years ago   Substance Use Topics    Alcohol use: Not Currently     Alcohol/week: 0.0 standard drinks     Comment: occasional    Drug use: No       SCREENINGS    [place] Coma Scale  Eye Opening: Spontaneous  Best Verbal Response: Oriented  Best Motor Response: Obeys commands  [place] Coma Scale Score: 15        PHYSICAL EXAM    (up to 7 for level 4, 8 or more for level 5)     ED Triage Vitals [06/12/20 1108]   BP Temp Temp Source Pulse Resp SpO2 Height Weight   (!) 127/55 98.7 °F (37.1 °C) Oral 86 18 95 % 4' 11\" (1.499 m) 169 lb (76.7 kg)       Physical Exam  Vitals signs and nursing note reviewed. Constitutional:       Appearance: She is well-developed. She is not diaphoretic. HENT:      Head: Normocephalic and atraumatic.       Right Ear: External ear normal.      Left Ear: External ear normal.   Eyes: General:         Right eye: No discharge. Left eye: No discharge. Neck:      Musculoskeletal: Normal range of motion and neck supple. Vascular: No JVD. Cardiovascular:      Pulses: Normal pulses. Pulmonary:      Effort: Pulmonary effort is normal. No respiratory distress. Breath sounds: Normal breath sounds. Abdominal:      Palpations: Abdomen is soft. Musculoskeletal: Normal range of motion. Comments: Bilateral pitting edema   Skin:     General: Skin is warm and dry. Coloration: Skin is not pale. Neurological:      Mental Status: She is alert and oriented to person, place, and time.    Psychiatric:         Behavior: Behavior normal.         DIAGNOSTIC RESULTS   LABS:    Labs Reviewed   CBC WITH AUTO DIFFERENTIAL - Abnormal; Notable for the following components:       Result Value    WBC 11.2 (*)     RBC 2.98 (*)     Hemoglobin 9.5 (*)     Hematocrit 29.6 (*)     RDW 16.6 (*)     Platelets 775 (*)     MPV 12.4 (*)     Neutrophils Absolute 9.8 (*)     Lymphocytes Absolute 0.7 (*)     All other components within normal limits    Narrative:     Performed at:  [facility] [address], [place], [address] [address]   Phone [phone]   COMPREHENSIVE METABOLIC PANEL - Abnormal; Notable for the following components:    CO2 17 (*)     Anion Gap 20 (*)      (*)     CREATININE 5.4 (*)     GFR Non- 7 (*)     GFR  9 (*)     Alb 3.0 (*)     Albumin/Globulin Ratio 0.8 (*)     All other components within normal limits    Narrative:     CALL  [facility] tel. [phone],  Chemistry results called to and read back by Alberto Balderas, 06/12/2020  13:31, by [facility]  Chemistry results called to and read back by Alberto Balderas, 06/12/2020  13:30, by [facility]  Performed at:  [facility] [address], [place], [facility] [address]   Phone [phone]   TROPONIN - Abnormal; Notable for the

## 2020-06-12 NOTE — PROGRESS NOTES
Patient set up with dinner tray. Tremors to hands improved. Patient able to feed self at this time. Fluids running per order. Bed in lowest position, needed items and call light in reach. Bed alarm engaged.

## 2020-06-12 NOTE — ED NOTES
Pt presents to ER from home for complaint of increasing generalized weakness, but especially to her legs. She states she has had difficulty walking, pain in her legs with movement, and shaking to her limbs when she tries to move/hold things. She denies N/T, fevers/chills, HA, dizziness, N/V/D, and changes to her urine. She has swelling to bilateral LEs, which is normal for her she states. Pt had pacemaker replaced in April. Pt is A/Ox4 and in no obvious distress.       Kathia Ferguson RN  06/12/20 [id number]

## 2020-06-12 NOTE — CARE COORDINATION
Discharge Planning Assessment    SW discharge planner met with patient to discuss reason for admission, current living situation, and potential needs at the time of discharge. Pt was sleeping so SW spoke with dtr, Azeem Miller, who was in the room. Pt in ED d/t acute renal failure    Demographics/Insurance verified:  Yes    Current type of dwelling:  [employer] - 3 steps in w/rails. Living arrangements:  Alone    Level of function/Support:  Dtr reported pt normally able to walk independently. Stated she has a stand up walker for times when she feels weak but doesn't use often. Family is very supportive helping with meals, shopping, transportation and daily checks. PCP:  Dr. Ezra Lemus    Last Visit to PCP:  Nov 2019    DME:  judith Beltran, stand up walker    Active with any community resources/agencies/skilled home care:  None. Reported family takes care of all non-skilled needs. Medication compliance issues:  No.  Sister organizes and pt takes independently. Financial issues that could impact healthcare:  No    Tentative discharge plan:  PT/OT pending. Unsure at this time. SW to follow therapy evals. Transportation at the time of discharge:  Family can transport.     Electronically signed by LEONA Lorenz, SHERIW on 6/12/2020 at 2:50 PM

## 2020-06-12 NOTE — ACP (ADVANCE CARE PLANNING)
Advance Care Planning     Advance Care Planning Activator (Inpatient)  Conversation Note      Date of ACP Conversation: 6/12/2020    Conversation Conducted with: Patient with Martha 51: Named in Advance Directive or Healthcare Power of  (name) Luanne Roy - daughter    ACP Activator: Ted Dean    *Pt is normally alert and oriented. Pt was sleeping during assessment so SW confirmed information with POA daughter. Health Care Decision Maker:     Current Designated Health Care Decision Maker:   Primary Decision Maker: Jared Olsen Child - [phone]    Secondary Decision Maker: Sp Tatianasony Child - [phone]      If no Authorized Decision Maker has previously been identified, then patient chooses Health Care Decision Maker:  \"Who would you like to name as your primary health care decision-maker? \"               Name: Luanne Roy        Relationship: POA / daughter          Phone number: [phone]  Benjamin Jurado this person be reached easily? \" Yes  \"Who would you like to name as your back-up decision maker? \"   Name: Libertynewton MayersHoracio        Relationship: daughter          Phone number: [phone]  Benjamin Colonel this person be reached easily? \" Yes      Care Preferences    Ventilation: \"If you were in your present state of health and suddenly became very ill and were unable to breathe on your own, what would your preference be about the use of a ventilator (breathing machine) if it were available to you? \"      Would the patient desire the use of ventilator (breathing machine)?: no    \"If your health worsens and it becomes clear that your chance of recovery is unlikely, what would your preference be about the use of a ventilator (breathing machine) if it were available to you? \"     Would the patient desire the use of ventilator (breathing machine)?: No      Resuscitation  \"CPR works best to restart the heart when there is a sudden event, like a heart attack, in someone who is

## 2020-06-12 NOTE — ED NOTES
Pt transferred upstairs via transport. VSS and pt in no obvious distress at time of transfer.       Nisha An RN  06/12/20 1785

## 2020-06-13 NOTE — H&P
HOSPITALISTS HISTORY AND PHYSICAL    [id number]    Patient Information:  Kelsey Antonio is a 80 y.o. female [id number]  PCP:  Ignacio Vega MD (Tel: [phone] )    Chief complaint:    Chief Complaint   Patient presents with    Extremity Weakness     Pt to ER via EMS from home for complaint of generalized weakess and shaking, but especially to her legs. Reports she has had difficulty walking and can't hold onto anything. Pt lives alone and is typically independent. A/Ox4        History of Present Illness:  Richi Sen is a 80 y.o. female who presented with h/o CHF, CKD,  COPD, DM, pacemaker in place presents with   C/o weakness, tremors and difficulty walking for 3 days. Also reports pain and swelling of legs. She is found to have worsening renal failure with  cr 5.4  Her nephrologist is dr. Myriam Ryan. The pt also has mild leucocytosis and pyuria  REVIEW OF SYSTEMS:   Constitutional: Negative for fever,chills or night sweats  ENT: Negative for rhinorrhea, epistaxis, hoarseness, sore throat. Respiratory: Negative for shortness of breath,wheezing  Cardiovascular: Negative for chest pain, palpitations   Gastrointestinal: Negative for nausea, vomiting, diarrhea  Genitourinary: Negative for polyuria, dysuria   Hematologic/Lymphatic: Negative for bleeding tendency, easy bruising  Musculoskeletal: Negative for myalgias and arthralgias  Neurologic: Negative for confusion,dysarthria. Skin: Negative for itching,rash  Psychiatric: Negative for depression,anxiety, agitation. Endocrine: Negative for polydipsia,polyuria,heat /cold intolerance.     Past Medical History:   has a past medical history of Allergic rhinitis, cause unspecified, Benign neoplasm of colon, Blood transfusion, CAD (coronary artery disease), CHF (congestive heart failure) ([facility] [address] Diabetes mellitus ([facility] [address] Hyperlipidemia, Hypertension, Meralgia paresthetica of left side, Mitral valve disorders(424.0), Occlusion and stenosis of carotid artery without mention of cerebral infarction, Osteoarthrosis, unspecified whether generalized or localized, unspecified site, Osteoporosis, unspecified, Peptic ulcer, unspecified site, unspecified as acute or chronic, without mention of hemorrhage, perforation, or obstruction, Personal history of malignant neoplasm of breast, Type II or 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 Past Surgical History:   has a past surgical history that includes Cholecystectomy; Appendectomy; Varicose vein surgery; Ovary removal; Knee arthroscopy; Finger trigger release; A-V cardiac pacemaker insertion; Cardiac defibrillator placement; Colonoscopy; Colonoscopy (6/3/11); partial hysterectomy (cervix not removed); Upper gastrointestinal endoscopy (4/23/2012); Endoscopy, colon, diagnostic; pacemaker placement; Upper gastrointestinal endoscopy (5/2/12); Upper gastrointestinal endoscopy (6/7/12); and Upper gastrointestinal endoscopy (7/13/12). Medications:  No current facility-administered medications on file prior to encounter.       Current Outpatient Medications on File Prior to Encounter   Medication Sig Dispense Refill    gemfibrozil (LOPID) 600 MG tablet Take 600 mg by mouth 2 times daily (before meals)      losartan (COZAAR) 25 MG tablet Take 1 tablet by mouth nightly 90 tablet 1    furosemide (LASIX) 40 MG tablet Take 2 tablets by mouth 2 times daily 120 tablet 3    atorvastatin (LIPITOR) 40 MG tablet One daily 90 tablet 1    carvedilol (COREG) 3.125 MG tablet TAKE 1 TABLET BY MOUTH TWO  TIMES DAILY WITH MEALS 180 tablet 2    levothyroxine (SYNTHROID) 25 MCG tablet Take 1 tablet by mouth daily 90 tablet 1    allopurinol (ZYLOPRIM) 100 MG tablet Take 2 tablets by mouth daily 180 tablet 3    insulin glargine (LANTUS SOLOSTAR) 100 UNIT/ML injection pen Inject 60 Units into the skin nightly 60 mL 3    gabapentin (NEURONTIN) 100 MG capsule Take 2 capsules by mouth nightly for 182 days. 180 capsule 1    alendronate (FOSAMAX) 70 MG tablet TAKE 1 TABLET BY MOUTH  EVERY 7 DAYS 12 tablet 3    digoxin (LANOXIN) 125 MCG tablet Take 1 tablet by mouth three times a week Monday, weds, friday 90 tablet 3    insulin lispro (HUMALOG KWIKPEN) 100 UNIT/ML pen Inject 25 Units into the skin 3 times daily (before meals) 25 pen 3       Allergies: Allergies   Allergen Reactions    Asa [Aspirin]      GI bleeding    Morphine Other (See Comments)     Makes me goofy I climb the walls    Ace Inhibitors      cough    Codeine Other (See Comments)        Social History:   reports that she quit smoking about 15 years ago. Her smoking use included cigarettes. She has a 50.00 pack-year smoking history. She has never used smokeless tobacco. She reports previous alcohol use. She reports that she does not use drugs. Family History:  family history includes Cancer in her brother; Heart Disease in her father and mother. ,     Physical Exam:  /68   Pulse 86   Temp 99.8 °F (37.7 °C) (Oral)   Resp 20   Ht 4' 11\" (1.499 m)   Wt 169 lb (76.7 kg)   LMP  (LMP Unknown)   SpO2 92%   BMI 34.13 kg/m²     General appearance:  Appears comfortable. Well nourished  Eyes: Sclera clear, pupils equal  ENT: Moist mucus membranes, no thrush. Trachea midline. Cardiovascular: Regular rhythm, normal S1, S2. No murmur, gallop, rub. No edema in lower extremities  Respiratory: Clear to auscultation bilaterally, no wheeze, good inspiratory effort  Gastrointestinal: Abdomen soft, non-tender, not distended, normal bowel sounds  Musculoskeletal: No cyanosis in digits, neck supple  Neurology: Cranial nerves grossly intact. Alert and oriented in time, place and person. No speech or motor deficits  Psychiatry: Appropriate affect.  Not agitated  Skin: Warm, dry, normal turgor, no rash    Labs:  CBC:   Lab Results   Component Value Date WBC 11.2 06/12/2020    RBC 2.98 06/12/2020    RBC 3.49 10/29/2015    HGB 9.5 06/12/2020    HCT 29.6 06/12/2020    MCV 99.5 06/12/2020    MCH 32.0 06/12/2020    MCHC 32.1 06/12/2020    RDW 16.6 06/12/2020     06/12/2020    MPV 12.4 06/12/2020     BMP:    Lab Results   Component Value Date     06/12/2020    K 4.6 06/12/2020     06/12/2020    CO2 17 06/12/2020     06/12/2020    CREATININE 5.4 06/12/2020    CALCIUM 8.5 06/12/2020    GFRAA 9 06/12/2020    GFRAA 34 06/12/2013    LABGLOM 7 06/12/2020    GLUCOSE 99 06/12/2020    GLUCOSE 84 10/07/2015       Chest Xray:   EKG:    I visualized CXR images and EKG strips     Discussed  with      Problem List  Active Problems:    Acute on chronic renal insufficiency  Resolved Problems:    * No resolved hospital problems. *        Assessment/Plan:         1. Acute on chronic renal failure cr elevated to 5.4 up from 2.7  Received 500 cc fluid bolus in the ER  Cont gentle hydration overnight  Holding lasix and losartan  Nephrology on board    Pyuria  Previous urine cultures sensitive to cefepime  Will treat for now  Urine cultures pending    Type 2 Dm   Cont home dose of Lantus and sliding scale insulin  Carb control diet    Admit as inpatient. I anticipate hospitalization spanning more than two midnights for investigation and treatment of the above medically necessary diagnoses.       Mavis Toney MD    [id number]

## 2020-06-13 NOTE — PROGRESS NOTES
[facility]    6/13/2020 3:30 PM        Name: Kevin Robison . Admitted: 6/12/2020  Primary Care Provider: Jennifer Barth MD (Tel: [phone])      Subjective:  . Admitted with weakness for 3 days. Was found to have acute worsening of CKD. Also appears to have UTI. She is weak and states she is having difficulty holding things due to weakness. No sob or cp. No nausea.      Reviewed interval ancillary notes    Current Medications  cefepime (MAXIPIME) 1 g IVPB minibag, Q24H  dextrose 5 % solution,   insulin glargine (LANTUS;BASAGLAR) injection pen 40 Units, Nightly  sodium bicarbonate 150 mEq in dextrose 5 % 1,000 mL infusion, Continuous  atorvastatin (LIPITOR) tablet 40 mg, Nightly  carvedilol (COREG) tablet 3.125 mg, BID WC  digoxin (LANOXIN) tablet 125 mcg, Once per day on Mon Wed Fri  gabapentin (NEURONTIN) capsule 200 mg, Nightly  levothyroxine (SYNTHROID) tablet 25 mcg, Daily  sodium chloride flush 0.9 % injection 10 mL, 2 times per day  sodium chloride flush 0.9 % injection 10 mL, PRN  acetaminophen (TYLENOL) tablet 650 mg, Q6H PRN    Or  acetaminophen (TYLENOL) suppository 650 mg, Q6H PRN  polyethylene glycol (GLYCOLAX) packet 17 g, Daily PRN  promethazine (PHENERGAN) tablet 12.5 mg, Q6H PRN    Or  ondansetron (ZOFRAN) injection 4 mg, Q6H PRN  enoxaparin (LOVENOX) injection 30 mg, Daily  glucose (GLUTOSE) 40 % oral gel 15 g, PRN  dextrose 50 % IV solution, PRN  glucagon (rDNA) injection 1 mg, PRN  dextrose 5 % solution, PRN  insulin lispro (1 Unit Dial) 0-12 Units, TID WC  insulin lispro (1 Unit Dial) 0-6 Units, Nightly        Objective:  /82   Pulse 76   Temp 97.7 °F (36.5 °C) (Oral)   Resp 20   Ht 4' 11\" (1.499 m)   Wt 169 lb (76.7 kg)   LMP  (LMP Unknown)   SpO2 91%   BMI 34.13 kg/m²     Intake/Output Summary (Last 24 hours) at 6/13/2020 1530  Last data filed at 6/13/2020

## 2020-06-14 NOTE — PROGRESS NOTES
Pt moved from [place] [id number]. Pt's son, Dana Kline, notified.  Electronically signed by Janis Pittman RN on 6/14/2020 at 1:20 PM

## 2020-06-14 NOTE — PROGRESS NOTES
Advanced Care Planning Note. Purpose of Encounter: Advanced care planning in light of severe renal failure  Parties In Attendance: Patient  Decisional Capacity: Yes  Subjective: Patient admitted with acute worsening of CKD, UTI, weakness. Creat 5.7 and not improving despite IV fluids. She had previously stated she would not want dialysis. Objective:  Creat 5.7. Goals of Care Determination: Patient affirms DNR  Status however she does state she would want dialysis if without she will die. She had previously declined hospice because she though it was painful. Plan:  Getting lasix today as well as IV abx. Repeat labs in am. Defer decision regarding dialysis to nephrology. Code Status:  DNR CCA.    Time spent on Advanced care Plannin Hayley Hernandez: Completed advanced directives on chart    Shereen Burr PA-C  2020 1:09 PM

## 2020-06-14 NOTE — PROGRESS NOTES
Physical Therapy    Facility/Department: [address] ONCOLOGY  Initial Assessment    NAME: Majo Walter  : 3/26/1929  MRN: [id number]    Date of Service: 2020    Discharge Recommendations:  Majo Walter scored a  on the AM-PAC short mobility form. Current research shows that an AM-PAC score of 17 or less is typically not associated with a discharge to the patient's home setting. Based on the patient's AM-PAC score and their current functional mobility deficits, it is recommended that the patient have 3-5 sessions per week of Physical Therapy at d/c to increase the patient's independence. At this time, this patient lacks the endurance, and/or tolerance for 3 hours of therapy/day, 5-7x/wk and would benefit most from a follow up treatment frequency of 3-5x/wk. Please see assessment section for further patient specific details. If patient discharges prior to next session this note will serve as a discharge summary. Please see below for the latest assessment towards goals. 3-5 sessions per week   PT Equipment Recommendations  Equipment Needed: No    Assessment   Body structures, Functions, Activity limitations: Decreased functional mobility ; Decreased strength;Decreased endurance;Decreased safe awareness;Decreased balance  Assessment: Patient not at baseline function and would benefit from skilled PT to address above deficits and facilitate return to baseline function  Treatment Diagnosis: decreased functional mobility, impaired gait, decreased balance  Prognosis: Good  Decision Making: Medium Complexity  Clinical Presentation: evolving  PT Education: Goals;PT Role;Plan of Care  Patient Education: d/c recommendations - verbalized understanding  Barriers to Learning: none  REQUIRES PT FOLLOW UP: Yes  Activity Tolerance  Activity Tolerance: Patient limited by fatigue  Activity Tolerance: limited by myoclonic-like jerking       Patient Diagnosis(es): The primary encounter diagnosis was Acute renal failure, unspecified acute renal failure type ([facility] [address] Diagnoses of Unable to walk, Pedal edema, and Generalized weakness were also pertinent to this visit. has a past medical history of Allergic rhinitis, cause unspecified, Benign neoplasm of colon, Blood transfusion, CAD (coronary artery disease), CHF (congestive heart failure) ([address] Diabetes mellitus ([address] Hyperlipidemia, Hypertension, Meralgia paresthetica of left side, Mitral valve disorders(424.0), Occlusion and stenosis of carotid artery without mention of cerebral infarction, Osteoarthrosis, unspecified whether generalized or localized, unspecified site, Osteoporosis, unspecified, Peptic ulcer, unspecified site, unspecified as acute or chronic, without mention of hemorrhage, perforation, or obstruction, Personal history of malignant neoplasm of breast, Type II or 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 has a past surgical history that includes Cholecystectomy; Appendectomy; Varicose vein surgery; Ovary removal; Knee arthroscopy; Finger trigger release; A-V cardiac pacemaker insertion; Cardiac defibrillator placement; Colonoscopy; Colonoscopy (6/3/11); partial hysterectomy (cervix not removed); Upper gastrointestinal endoscopy (4/23/2012); Endoscopy, colon, diagnostic; pacemaker placement; Upper gastrointestinal endoscopy (5/2/12); Upper gastrointestinal endoscopy (6/7/12); and Upper gastrointestinal endoscopy (7/13/12). Restrictions  Restrictions/Precautions  Restrictions/Precautions: Fall Risk  Required Braces or Orthoses?: No  Position Activity Restriction  Other position/activity restrictions: Pt to ER via EMS from home for complaint of generalized weakess and shaking, but especially to her legs. Reports she has had difficulty walking and can't hold onto anything. Pt lives alone and is typically independent.  A/Ox4  Vision/Hearing  Vision: Impaired  Vision Exceptions: Wears glasses at all times  Hearing: Exceptions to [facility]  Hearing Exceptions: Hard of hearing/hearing concerns;Bilateral hearing aid     Subjective  General  Chart Reviewed: Yes  Family / Caregiver Present: No  Diagnosis: acute on chronic renal insufficiency  Follows Commands: Within Functional Limits  General Comment  Comments: supine in bed upon arrival with alarm on. Generalized myoclonic type jerking with all attempts of mobility  Subjective  Subjective: Denied pain at rest. Reported RLE pain with mobility. States \"that's my bad leg\". Eyes closed throughout beginning of session. Pain Screening  Patient Currently in Pain: No          Orientation  Orientation  Overall Orientation Status: Impaired  Orientation Level: Oriented to place;Oriented to time;Oriented to person(difficulty with situation)  Social/Functional History  Social/Functional History  Lives With: Alone  Type of Home: House  Home Layout: One level  Home Access: Stairs to enter with rails  Entrance Stairs - Number of Steps: 5 steps to enter  Entrance Stairs - Rails: Both  Bathroom Shower/Tub: Tub/Shower unit, Walk-in shower  Bathroom Equipment: Grab bars in shower  Home Equipment: Rolling walker, Cane  ADL Assistance: Independent  Homemaking Assistance: Independent  Homemaking Responsibilities: Yes  Meal Prep Responsibility: Primary  Ambulation Assistance: Independent  Active : No  Patient's  Info: Family provides transportation, niece goes to grocery store   Occupation: Retired  Type of occupation: Retired insurance company   Leisure & Hobbies: Watch baseball   IADL Comments: Family provides transportation, and has a cleaning lady. Additional Comments: Walks with a RW outside and a cane in the house. No falls in last 6 months.  \"I passed out from 4 pm to 9 am the next morning\"       Objective     Observation/Palpation  Observation: myoclonic like jerking with all attempts of UE/LE movement    AROM RLE (degrees)  RLE AROM:

## 2020-06-14 NOTE — PROGRESS NOTES
injection 10 mL, 2 times per day  sodium chloride flush 0.9 % injection 10 mL, PRN  acetaminophen (TYLENOL) tablet 650 mg, Q6H PRN    Or  acetaminophen (TYLENOL) suppository 650 mg, Q6H PRN  polyethylene glycol (GLYCOLAX) packet 17 g, Daily PRN  promethazine (PHENERGAN) tablet 12.5 mg, Q6H PRN    Or  ondansetron (ZOFRAN) injection 4 mg, Q6H PRN  glucose (GLUTOSE) 40 % oral gel 15 g, PRN  dextrose 50 % IV solution, PRN  glucagon (rDNA) injection 1 mg, PRN  dextrose 5 % solution, PRN  insulin lispro (1 Unit Dial) 0-12 Units, TID WC  insulin lispro (1 Unit Dial) 0-6 Units, Nightly      Physical exam:     Vitals:  /61   Pulse 86   Temp 98.5 °F (36.9 °C) (Oral)   Resp 20   Ht 4' 11\" (1.499 m)   Wt 169 lb (76.7 kg)   LMP  (LMP Unknown)   SpO2 93%   BMI 34.13 kg/m²     Constitutional:  awake, NAD  HEENT:  MMM  Neck: supple, no JVD  Cardiovascular:  S1, S2 reg  Respiratory: diminished at bases  Abdomen:  +BS, soft, ND  Ext: trace lower extremity edema  Skin: dry/intact  CNS: , no agitation     Labs:  CBC:   Recent Labs     06/12/20  1215   WBC 11.2*   HGB 9.5*   *     BMP:    Recent Labs     06/12/20  1215 06/13/20  0621 06/14/20  0747    139 141   K 4.6 4.8 4.3    104 105   CO2 17* 11* 18*   * 150* 144*   CREATININE 5.4* 5.6* 5.7*   GLUCOSE 99 43* 72     Ca/Mg/Phos:   Recent Labs     06/12/20  1215 06/13/20  0621 06/14/20  0747   CALCIUM 8.5 8.1* 7.9*     Hepatic:   Recent Labs     06/12/20  1215   AST 28   ALT 13   BILITOT <0.2   ALKPHOS 66     Troponin:   Recent Labs     06/12/20  1215   TROPONINI 0.03*     BNP: No results for input(s): BNP in the last 72 hours. Lipids: No results for input(s): CHOL, TRIG, HDL, LDLCALC, LABVLDL in the last 72 hours. ABGs: No results for input(s): PHART, PO2ART, KDH1PGY in the last 72 hours. INR: No results for input(s): INR in the last 72 hours.   UA:  Recent Labs     06/12/20  1451   COLORU YELLOW   CLARITYU CLOUDY*   GLUCOSEU Negative

## 2020-06-15 NOTE — PROGRESS NOTES
UP: Yes  Activity Tolerance  Activity Tolerance: Patient Tolerated treatment well  Safety Devices  Safety Devices in place: Yes  Type of devices: All fall risk precautions in place;Call light within reach; Bed alarm in place; Patient at risk for falls; Left in bed;Nurse notified(pt leaving for procedure at end of session)         Patient Diagnosis(es): The primary encounter diagnosis was Acute renal failure, unspecified acute renal failure type ([address] Diagnoses of Unable to walk, Pedal edema, and Generalized weakness were also pertinent to this visit. has a past medical history of Allergic rhinitis, cause unspecified, Benign neoplasm of colon, Blood transfusion, CAD (coronary artery disease), CHF (congestive heart failure) ([address] Diabetes mellitus ([address] Hyperlipidemia, Hypertension, Meralgia paresthetica of left side, Mitral valve disorders(424.0), Occlusion and stenosis of carotid artery without mention of cerebral infarction, Osteoarthrosis, unspecified whether generalized or localized, unspecified site, Osteoporosis, unspecified, Peptic ulcer, unspecified site, unspecified as acute or chronic, without mention of hemorrhage, perforation, or obstruction, Personal history of malignant neoplasm of breast, Type II or 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 has a past surgical history that includes Cholecystectomy; Appendectomy; Varicose vein surgery; Ovary removal; Knee arthroscopy; Finger trigger release; A-V cardiac pacemaker insertion; Cardiac defibrillator placement; Colonoscopy; Colonoscopy (6/3/11); partial hysterectomy (cervix not removed); Upper gastrointestinal endoscopy (4/23/2012); Endoscopy, colon, diagnostic; pacemaker placement; Upper gastrointestinal endoscopy (5/2/12);  Upper gastrointestinal endoscopy (6/7/12); and Upper gastrointestinal endoscopy Inpatient Daily Activity Raw Score: 9 (06/15/20 1112)  AM-PAC Inpatient ADL T-Scale Score : 25.33 (06/15/20 1112)  ADL Inpatient CMS 0-100% Score: 79.59 (06/15/20 1112)  ADL Inpatient CMS G-Code Modifier : CL (06/15/20 1112)    Goals  Short term goals  Time Frame for Short term goals: Until discharge  Short term goal 1: Mod assist bathing-continue goal 6/15  Short term goal 2: Mod assist dressing-continue goal 6/15  Short term goal 3: Mod assist functional mobility-continue goal 6/15  Short term goal 4: Mod assist functional transfers-continue goal 6/15  Long term goals  Time Frame for Long term goals : STGs= LTGs  Patient Goals   Patient goals : None stated       Therapy Time   Individual Concurrent Group Co-treatment   Time In       1058   Time Out       1109   Minutes       11      Timed Code Treatment Minutes:  11 Minutes  Total Treatment Minutes:  [facility] [address], [address]

## 2020-06-15 NOTE — PROGRESS NOTES
0400 assessment complete. Patient asleep upon entering. Did not wake when I checked blood sugar. Blood glucose check of 61. 12.5 g of dextrose 50% give. Recheck of 104. O2 sats at 90% while patient is sleeping. Otherwise, no other acute vital changes. See Flowsheets and complex assessment. Patient still refused repositioning because she likes to sleep on R side.

## 2020-06-15 NOTE — PROGRESS NOTES
0000 assessment complete. Patient asleep upon entering. Easily aroused by (loud) verbal stimuli (hearing aids removed for the night and placed on ). Remains in NSR and on RA sating at 93%. Expiratory wheezes still audible in bilateral upper lobes. Breathing labor and tachypneic. Repositioned to R side per patient request. States that she can only sleep lying on her R side. Pillow support. No evidence of pain or distress at this time. Will continue to monitor.

## 2020-06-15 NOTE — PROGRESS NOTES
Patient's blood glucose level 65, pt NPO, gave 1/2 amp D50%.   Rechecked blood glucose level and was 146

## 2020-06-15 NOTE — PROGRESS NOTES
kg) Comment: carried over from last weight; bed not working-saying 3.2 lb  LMP  (LMP Unknown)   SpO2 95%   BMI 34.15 kg/m²     Intake/Output Summary (Last 24 hours) at 6/15/2020 1245  Last data filed at 6/15/2020 0600  Gross per 24 hour   Intake 130 ml   Output 975 ml   Net -845 ml      Wt Readings from Last 3 Encounters:   06/15/20 169 lb 1.5 oz (76.7 kg)   05/04/20 177 lb 12.8 oz (80.6 kg)   04/30/20 177 lb (80.3 kg)       General appearance:  Appears comfortable but weak  Eyes: Sclera clear. Pupils equal.  ENT: Moist oral mucosa. Trachea midline, no adenopathy. Cardiovascular: Regular rhythm, normal S1, S2. No murmur. No edema in lower extremities  Respiratory: Not using accessory muscles. Good inspiratory effort. Clear to auscultation bilaterally, no wheeze or crackles. GI: Abdomen soft, no tenderness, not distended, normal bowel sounds  Musculoskeletal: No cyanosis in digits, neck supple  Neurology: CN 2-12 grossly intact. No speech or motor deficits  Psych: Normal affect. Alert and oriented in time, place and person  Skin: Warm, dry, normal turgor    Labs and Tests:  CBC:   No results for input(s): WBC, HGB, PLT in the last 72 hours. BMP:    Recent Labs     06/13/20  0621 06/14/20  0747 06/15/20  0429    141 142   K 4.8 4.3 4.2    105 105   CO2 11* 18* 19*   * 144* 148*   CREATININE 5.6* 5.7* 5.5*   GLUCOSE 43* 72 151*     Hepatic:   No results for input(s): AST, ALT, ALB, BILITOT, ALKPHOS in the last 72 hours. CT head  No acute intracranial abnormality. CT L spine  Lumbar spine degenerative changes without acute findings.       Small left pleural effusion. Urine Cultures  Escherichia coli (1)     Antibiotic Interpretation ANUPAM Status    ampicillin Sensitive <=2 mcg/mL     ceFAZolin Sensitive <=4 mcg/mL      NOTE: Cefazolin should only be used for uncomplicated UTI        for E.coli or Klebsiella pneumoniae.    cefepime Sensitive <=0.12 mcg/mL     cefTRIAXone Sensitive <=0.25

## 2020-06-15 NOTE — PROGRESS NOTES
Physical Therapy  Facility/Department: [address] ONCOLOGY  Daily Treatment Note  NAME: Evelyn Beard  : 3/26/1929  MRN: [id number]    Date of Service: 6/15/2020    Discharge Recommendations:  Evelyn Beard scored a / on the AM-PAC short mobility form. Current research shows that an AM-PAC score of 17 or less is typically not associated with a discharge to the patient's home setting. Based on the patient's AM-PAC score and their current functional mobility deficits, it is recommended that the patient have 3-5 sessions per week of Physical Therapy at d/c to increase the patient's independence. At this time, this patient lacks the endurance, and/or tolerance for 3 hours of therapy/day, 5-7x/wk and would benefit most from a follow up treatment frequency of 3-5x/wk. Please see assessment section for further patient specific details. If patient discharges prior to next session this note will serve as a discharge summary. Please see below for the latest assessment towards goals. 3-5 sessions per week   PT Equipment Recommendations  Equipment Needed: No    Assessment   Body structures, Functions, Activity limitations: Decreased functional mobility ; Decreased strength;Decreased endurance;Decreased safe awareness;Decreased balance  Assessment: Patient not at baseline function and would benefit from skilled PT to address above deficits and facilitate return to baseline function  Treatment Diagnosis: decreased functional mobility, impaired gait, decreased balance  Prognosis: Good  Decision Making: Medium Complexity  Clinical Presentation: evolving  PT Education: Goals;PT Role;Plan of Care  Patient Education: d/c recommendations - verbalized understanding  Barriers to Learning: none  REQUIRES PT FOLLOW UP: Yes  Activity Tolerance  Activity Tolerance: Patient limited by pain  Activity Tolerance: tx cut short due to procedure off of floor     Patient Diagnosis(es): The primary encounter diagnosis was Acute renal

## 2020-06-15 NOTE — PROGRESS NOTES
Shift handoff and assessment complete. Patient in bed watching TV. She is lethargic and responds to voice or new agitation/confusion. Oriented x4. Follows commands and demonstrates appropriate judgment, safety awareness, and attention/concentration. Speech is clear but with delayed responses d/t being hard of hearing even with hearing aids in. R AC PIV flushed with no blood return, normal saline locked, and alcohol capped. Garcia patent with yellow, cloudy urine. In NSR on RA sating 94%. Breathing is labored, shallow, and tachypneic with dyspnea at rest. Expiratory wheezes audible in bilateral upper lobes. Abdomen in soft and rounded with present bowel sounds in all four quadrants. +3 non-pitting edema in BUE and +3 pitting edema in BLE. Skin is cool, dry, and swollen with ecchymosis. Scattered bruising. Limited movement in all four extremities with generalized weakness. She states that she is \"jerky\" and anxious which is not her baseline. Blood glucose level 80. Apple juice provided. She had a hard time breathing between each pause/drink. States that she is not in any pain at this time; 0/10 pain level. Repositioned for comfort and the prevention of pressure ulcer formation.

## 2020-06-15 NOTE — PROGRESS NOTES
Notified FELICIA Meadows that pt returned from dialysis with labored breathing, tachypnea, wheezing, and low temp.   New orders entered, see orders

## 2020-06-16 NOTE — PROGRESS NOTES
meds given. Pt assessment done. VSS. Pt had been on 3L of Supp O2. Sat was at 98%. Put pt on 2.5L. Sat is at 96%. Will continue to monitor.  Let pt know that dialysis called and said she will be going down for dialysis at 0600 tomorrow morning

## 2020-06-16 NOTE — CARE COORDINATION
SW spoke daughter Refugio who reports SNF options are as follows:  1. [facility]  [facility]. [facility]  3.  [address] MSW, [address]

## 2020-06-16 NOTE — PROGRESS NOTES
Unknown)   SpO2 96%   BMI 34.15 kg/m²     Intake/Output Summary (Last 24 hours) at 6/16/2020 1637  Last data filed at 6/16/2020 1417  Gross per 24 hour   Intake 540 ml   Output 2100 ml   Net -1560 ml      Wt Readings from Last 3 Encounters:   05/04/20 177 lb 12.8 oz (80.6 kg)   04/30/20 177 lb (80.3 kg)   04/14/20 177 lb (80.3 kg)       General appearance:  Appears comfortable but weak  Eyes: Sclera clear. Pupils equal.  ENT: Moist oral mucosa. Trachea midline, no adenopathy. Cardiovascular: Regular rhythm, normal S1, S2. No murmur. No edema in lower extremities  Respiratory: Not using accessory muscles. Good inspiratory effort. Clear to auscultation bilaterally, no wheeze or crackles. GI: Abdomen soft, no tenderness, not distended, normal bowel sounds  Musculoskeletal: No cyanosis in digits, neck supple  Neurology: CN 2-12 grossly intact. No speech or motor deficits  Psych: Normal affect. Alert and oriented in time, place and person  Skin: Warm, dry, normal turgor    Labs and Tests:  CBC:   Recent Labs     06/16/20  0447   WBC 9.9   HGB 8.9*        BMP:    Recent Labs     06/14/20  0747 06/15/20  0429 06/16/20  0447    142 142   K 4.3 4.2 3.9    105 104   CO2 18* 19* 24   * 148* 83*   CREATININE 5.7* 5.5* 3.5*   GLUCOSE 72 151* 64*     Hepatic:   No results for input(s): AST, ALT, ALB, BILITOT, ALKPHOS in the last 72 hours. CT head  No acute intracranial abnormality. CT L spine  Lumbar spine degenerative changes without acute findings.       Small left pleural effusion. Urine Cultures  Escherichia coli (1)     Antibiotic Interpretation ANUPAM Status    ampicillin Sensitive <=2 mcg/mL     ceFAZolin Sensitive <=4 mcg/mL      NOTE: Cefazolin should only be used for uncomplicated UTI        for E.coli or Klebsiella pneumoniae.    cefepime Sensitive <=0.12 mcg/mL     cefTRIAXone Sensitive <=0.25 mcg/mL     ciprofloxacin Sensitive <=0.25 mcg/mL     ertapenem Sensitive <=0.12 mcg/mL

## 2020-06-16 NOTE — PLAN OF CARE
Problem: Falls - Risk of:  Goal: Will remain free from falls  Description: Will remain free from falls  Outcome: Ongoing  Goal: Absence of physical injury  Description: Absence of physical injury  Outcome: Ongoing     Problem: Pain:  Goal: Pain level will decrease  Description: Pain level will decrease  Outcome: Ongoing  Goal: Control of acute pain  Description: Control of acute pain  Outcome: Ongoing  Goal: Control of chronic pain  Description: Control of chronic pain  Outcome: Ongoing     Problem: Serum Glucose Level - Abnormal:  Goal: Ability to maintain appropriate glucose levels will improve  Description: Ability to maintain appropriate glucose levels will improve  Outcome: Ongoing     Problem: Breathing Pattern - Ineffective:  Goal: Ability to achieve and maintain a regular respiratory rate will improve  Description: Ability to achieve and maintain a regular respiratory rate will improve  Outcome: Ongoing

## 2020-06-16 NOTE — CARE COORDINATION
Per [facility] they cannot accept pt if she needs a bed versus chair. They do not have bed or audi lift. Will need to transport via wheelchair and transfer independently. Also, will need to do this for SNF transport. SW will need updated PT/OT, RN aware. Per Sammie, pt in need of vein mapping, RN aware.       Shaneka Helms MSW, [address]

## 2020-06-16 NOTE — PROGRESS NOTES
Office : [phone]     Fax :[phone]       Nephrology Note          Chief Complaint:    Chief Complaint   Patient presents with    Extremity Weakness     Pt to ER via EMS from home for complaint of generalized weakess and shaking, but especially to her legs. Reports she has had difficulty walking and can't hold onto anything. Pt lives alone and is typically independent. A/Ox4         History of Present iIlness:    Richi Sen is a 80 y.o. female with h/o CKD stage 4 , edema , diastolic and systolic dysfunction who came in to the ER with c/o   Feeling very weak. No fever, no chills. She was feeling fine till Wednesday evening . She was with her friends at home. She mentioned that since Wednesday evening till Thursday morning she passed out on the chair . She denies any chest pain, no SOB. Now BUN is 140 and creat is 5.4       INTERVAL HISTORY      BUN and creatinine remains elevated. Awake but feeling very weak  Son at bedside   Poor oral intake because of uremia           Allergies:  Asa [aspirin]; Morphine;  Ace inhibitors; and Codeine    Current Medications:    heparin (porcine) injection 2,600 Units, PRN  ipratropium-albuterol (DUONEB) nebulizer solution 1 ampule, Q4H PRN  cefTRIAXone (ROCEPHIN) 1 g in sterile water 10 mL IV syringe, Q24H  heparin (porcine) injection 5,000 Units, 3 times per day  atorvastatin (LIPITOR) tablet 40 mg, Nightly  carvedilol (COREG) tablet 3.125 mg, BID WC  digoxin (LANOXIN) tablet 125 mcg, Once per day on Mon Wed Fri  levothyroxine (SYNTHROID) tablet 25 mcg, Daily  sodium chloride flush 0.9 % injection 10 mL, 2 times per day  sodium chloride flush 0.9 % injection 10 mL, PRN  acetaminophen (TYLENOL) tablet 650 mg, Q6H PRN    Or  acetaminophen

## 2020-06-17 NOTE — PROGRESS NOTES
Irving Miller, EPIU in the last 72 hours. Urine Culture:   No results for input(s): LABURIN in the last 72 hours. Urine Chemistry:   No results for input(s): Marquez Prior, PROTEINUR, NAUR in the last 72 hours. IMAGING:  CT CHEST PULMONARY EMBOLISM W CONTRAST   Final Result   No evidence of significant or central pulmonary embolism. However, lower   pulmonary vessels are obscured with respiratory motion and somewhat diluted   contrast.      Cardiomegaly with pleural effusion, vascular congestion and 3rd spacing of   fluid. This most likely represents mild congestive failure. Left basilar airspace disease likely compressive atelectasis given the   effusion. IR FLUORO GUIDED CVA DEVICE PLMT/REPLACE/REMOVAL   Final Result   Successful ultrasound and fluoroscopy guided non-tunneled 16 cm hemodialysis   catheter placement. XR CHEST PORTABLE   Final Result   CHF with mild interstitial edema and small bilateral effusions. Increased   dependent left basilar opacification, likely atelectasis. CT LUMBAR SPINE WO CONTRAST   Final Result   Lumbar spine degenerative changes without acute findings. Small left pleural effusion. CT Head WO Contrast   Final Result   No acute intracranial abnormality. XR CHEST PORTABLE   Final Result   1. Cardiomegaly with no evidence of edema   2. Chronic interstitial lung disease             I/O last 3 completed shifts: In: 5 [P.O.:120]  Out: 1525 [Urine:225]      Assessment/Plan :      1. ESRD   Had 2 sessions   Patient Pulled non tunneled cath out by mistake    2. HTN. Controlled       3. Anemia of chronic ds. HB is 8.9   Monitor     4.  anion gap acidosis   Will correct with HD     5. Electrolytes. Monitor     6. NO wheezing / SOB today . CT negative for PE     D/w her Rosary Muta - daughter [place]. She will d/w her other siblings and let me know if they want to continue dialysis.    Will keep NPO for tunneled catheter placement tomorrow.    D/w Norma Pate-ANALY    Thank you for allowing us to participate in care of Tamir Kim         Electronically signed by: Chelo Fowler MD, 6/17/2020,    [facility] [address] [place] [address] S  Office : [phone]  Fax :[phone]

## 2020-06-18 NOTE — PROGRESS NOTES
packet 17 g, Daily PRN  promethazine (PHENERGAN) tablet 12.5 mg, Q6H PRN    Or  ondansetron (ZOFRAN) injection 4 mg, Q6H PRN  glucose (GLUTOSE) 40 % oral gel 15 g, PRN  dextrose 50 % IV solution, PRN  glucagon (rDNA) injection 1 mg, PRN  dextrose 5 % solution, PRN  insulin lispro (1 Unit Dial) 0-12 Units, TID WC  insulin lispro (1 Unit Dial) 0-6 Units, Nightly      Physical exam:     Vitals:  BP (!) 160/69   Pulse 98   Temp 98.2 °F (36.8 °C) (Oral)   Resp 20   Ht 4' 11\" (1.499 m)   Wt 178 lb 9.2 oz (81 kg)   LMP  (LMP Unknown)   SpO2 97%   BMI 36.07 kg/m²     Constitutional:  awake, NAD  HEENT:  MMM  Neck: supple, no JVD  Cardiovascular:  S1, S2 reg  Respiratory: diminished at bases  Abdomen:  +BS, soft, ND  Ext: trace lower extremity edema  Skin: dry/intact  CNS: No agitation     Labs:  CBC:   Recent Labs     06/16/20 0447   WBC 9.9   HGB 8.9*        BMP:    Recent Labs     06/16/20 0447 06/17/20  0429    138   K 3.9 4.2    99   CO2 24 24   BUN 83* 48*   CREATININE 3.5* 2.8*   GLUCOSE 64* 131*     Ca/Mg/Phos:   Recent Labs     06/16/20 0447 06/17/20  0429   CALCIUM 8.1* 8.7     Hepatic:   No results for input(s): AST, ALT, ALB, BILITOT, ALKPHOS in the last 72 hours. Troponin:   No results for input(s): TROPONINI in the last 72 hours. BNP: No results for input(s): BNP in the last 72 hours. Lipids: No results for input(s): CHOL, TRIG, HDL, LDLCALC, LABVLDL in the last 72 hours. ABGs: No results for input(s): PHART, PO2ART, ERH0ADF in the last 72 hours. INR:   No results for input(s): INR in the last 72 hours. UA:  No results for input(s): Marinus Showers, GLUCOSEU, BILIRUBINUR, KETUA, SPECGRAV, BLOODU, PHUR, PROTEINU, UROBILINOGEN, NITRU, LEUKOCYTESUR, Verneita Bolk in the last 72 hours. Urine Microscopic:   No results for input(s): LABCAST, BACTERIA, COMU, HYALCAST, WBCUA, RBCUA, EPIU in the last 72 hours.   Urine Culture:   No results for input(s): LABURIN in the last 72

## 2020-06-18 NOTE — DISCHARGE INSTR - COC
Influenza Whole 10/29/2010    Influenza, High Dose (Fluzone 65 yrs and older) 10/10/2013, 10/02/2015, 10/07/2016, 11/06/2017, 08/31/2018    Influenza, Triv, inactivated, subunit, adjuvanted, IM (Fluad 65 yrs and older) 11/26/2019    Pneumococcal Conjugate 13-valent ([id number]) 01/08/2016    Pneumococcal Conjugate 7-valent (Prevnar7) 10/29/2010    Pneumococcal Polysaccharide ([id number]) 10/03/2011    Zoster Live (Zostavax) 10/22/2007    Zoster Recombinant (Shingrix) 03/25/2019, 06/03/2019       Active Problems:  Patient Active Problem List   Diagnosis Code    COPD (chronic obstructive pulmonary disease) ([facility] J44.9    Asthma J45.909    Essential hypertension I10    CHF (congestive heart failure) I50.9    Primary cardiomyopathy ([facility] I42.8    Encounter for removal of cardiac resynchronization therapy pacemaker (CRT-P) Z45.018    GI bleed K92.2    Meralgia paresthetica of left side G57.12    Psoriasis L40.9    Type 2 diabetes mellitus with diabetic chronic kidney disease ([facility] E11.22    Iron deficiency anemia due to chronic blood loss D50.0    Anemia in CKD (chronic kidney disease) N18.9, S09.7    Systolic CHF, chronic (HCC) I50.22    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    Osteoporosis M81.0    History of skin cancer Z85.828    Type 2 diabetes mellitus with diabetic neuropathy, with long-term current use of insulin (HCC) E11.40, Z79.4    CKD (chronic kidney disease), stage IV (HCC) N18.4    Dilated cardiomyopathy ([facility] I42.0    Acute on chronic renal insufficiency N28.9, N18.9       Isolation/Infection:   Isolation          No Isolation        Patient Infection Status     Infection Onset Added Last Indicated Last Indicated By Review Planned Expiration Resolved Resolved By    None active    Resolved    COVID-19 Rule Out 06/12/20 06/12/20 06/12/20 COVID-19 (Ordered)   06/14/20 Rule-Out Test Resulted          Nurse Assessment:  Last Vital Signs: BP (!) 157/73   Pulse

## 2020-06-18 NOTE — DISCHARGE SUMMARY
[facility] DISCHARGE SUMMARY    Patient Demographics    Patient. Julia Clark  Date of Birth. 3/26/1929  MRN. [id number]     Primary care provider. Sruthi Singer MD  (Tel: [phone])    Admit date: 6/12/2020    Discharge date (blank if same as Note Date): Note Date: 6/18/2020     Reason for Hospitalization. Chief Complaint   Patient presents with    Extremity Weakness     Pt to ER via EMS from home for complaint of generalized weakess and shaking, but especially to her legs. Reports she has had difficulty walking and can't hold onto anything. Pt lives alone and is typically independent. A/Ox4         Significant Findings. Active Problems:  ESRD  UTI  Hypetension       Problems and results from this hospitalization that need follow up. 1. None     Significant test results and incidental findings. CT head  No acute intracranial abnormality.     CT L spine  Lumbar spine degenerative changes without acute findings.       Small left pleural effusion.      Urine Cultures  Escherichia coli (1)               Antibiotic Interpretation ANUPAM Status     ampicillin Sensitive <=2 mcg/mL       ceFAZolin Sensitive <=4 mcg/mL         NOTE: Cefazolin should only be used for uncomplicated UTI        for E.coli or Klebsiella pneumoniae.     cefepime Sensitive <=0.12 mcg/mL       cefTRIAXone Sensitive <=0.25 mcg/mL       ciprofloxacin Sensitive <=0.25 mcg/mL       ertapenem Sensitive <=0.12 mcg/mL       gentamicin Sensitive <=1 mcg/mL       levofloxacin Sensitive <=0.12 mcg/mL       nitrofurantoin Sensitive <=16 mcg/mL       piperacillin-tazobactam Sensitive <=4 mcg/mL       trimethoprim-sulfamethoxazole Sensitive <=20 mcg/mL          CTPA  No evidence of significant or central pulmonary embolism.  However, lower   pulmonary vessels are obscured with respiratory motion and somewhat diluted   contrast.     Facial symmetry. No speech deficits. Moving all extremities equally. Extremities. No edema in lower extremities. Skin. Warm, dry, normal turgor    Condition at time of discharge stable    Medication instructions provided to patient at discharge. Medication List      START taking these medications    LORazepam 0.5 MG tablet  Commonly known as:  Ativan  Take 1 tablet by mouth every 4 hours as needed for Anxiety for up to 30 days. morphine sulfate 20 MG/ML concentrated oral solution  Take 0.25 mLs by mouth every 4 hours as needed for Pain for up to 3 days. CHANGE how you take these medications    furosemide 40 MG tablet  Commonly known as:  Lasix  Take 2 tablets by mouth daily  What changed:  when to take this        CONTINUE taking these medications    carvedilol 3.125 MG tablet  Commonly known as:  COREG  TAKE 1 TABLET BY MOUTH TWO  TIMES DAILY WITH MEALS     digoxin 125 MCG tablet  Commonly known as:  LANOXIN  Take 1 tablet by mouth three times a week Monday, weds, friday     gabapentin 100 MG capsule  Commonly known as:  NEURONTIN  Take 2 capsules by mouth nightly for 182 days.      levothyroxine 25 MCG tablet  Commonly known as:  SYNTHROID  Take 1 tablet by mouth daily        STOP taking these medications    alendronate 70 MG tablet  Commonly known as:  FOSAMAX     allopurinol 100 MG tablet  Commonly known as:  ZYLOPRIM     atorvastatin 40 MG tablet  Commonly known as:  LIPITOR     blood glucose monitor kit and supplies     blood glucose test strips strip  Commonly known as:  ASCENSIA AUTODISC VI;ONE TOUCH ULTRA TEST VI     CENTRUM SILVER PO     Garlic 812 MG Tabs     gemfibrozil 600 MG tablet  Commonly known as:  LOPID     insulin lispro 100 UNIT/ML pen  Commonly known as:  HumaLOG KwikPen     Insulin Pen Needle 32G X 4 MM Misc  Commonly known as:  BD Pen Needle Christal U/F     Lantus SoloStar 100 UNIT/ML injection pen  Generic drug:  insulin glargine     losartan 25 MG tablet  Commonly known as:

## 2020-08-07 RX ORDER — LEVOTHYROXINE SODIUM 0.03 MG/1
25 TABLET ORAL DAILY
Qty: 90 TABLET | Refills: 3 | OUTPATIENT
Start: 2020-08-07

## 2021-06-21 NOTE — CONSULTS
Letter by Savanna Landaverde RN at      Author: Savanna Landaverde RN Service: -- Author Type: --    Filed:  Encounter Date: 2/23/2021 Status: (Other)       [facility] Advance Care Planning       Chasity Gaspar  [address]  [place] [address]      Dear Chasity,    You shared with me your interest in receiving information on Advance Care Planning and Health Care Directives. Discussing and making decisions about this part of our health is very important.  A Health Care Directive is a written document that outlines your goals, values, beliefs and choices for health care and medical treatment in the event you are unable to speak for yourself.     We greatly value the opportunity to assist you in documenting your choices and to honor your   wishes. Weve enclosed resources to help you get started thinking about your values and goals. We have several options for additional resources:       Health Care Directives and Advance Care Planning resources can be viewed and printed   for free at our web site:  [email or web address].       Free group classes on Advance Care Planning and completing a Health Care Directive are available at multiple locations and times. These classes are led by trained staff who will provide information and guide you through a Health Care Directive.  They can also review, notarize and add your Health Care Directive to your medical record. Oto for a class at [email or web address] or by calling [employer] at [phone] or toll free [phone].      COPIES of completed Health Care Directives can be brought or mailed to any of our   locations, including the address listed below. You can also email a copy to [email or web address] .      Email or call me at the contact information listed below for questions, assistance, or to   make an appointment to discuss creating a Health Care Directive. You can also contact   our [facility] Department for questions or assistance.        Sincerely,     Savanna Landaverde RN  Care Coordinator  [facility]               much less than her chronic edema )   Psychiatric: mood and affect appropriate  Musculoskeletal:  Rom, muscular strength intact    Labs:  CBC:   Recent Labs     06/12/20  1215   WBC 11.2*   HGB 9.5*   *     BMP:    Recent Labs     06/12/20  1215      K 4.6      CO2 17*   *   CREATININE 5.4*   GLUCOSE 99     Ca/Mg/Phos:   Recent Labs     06/12/20  1215   CALCIUM 8.5     Hepatic:   Recent Labs     06/12/20  1215   AST 28   ALT 13   BILITOT <0.2   ALKPHOS 66     Troponin:   Recent Labs     06/12/20  1215   TROPONINI 0.03*     BNP: No results for input(s): BNP in the last 72 hours. Lipids: No results for input(s): CHOL, TRIG, HDL, LDLCALC, LABVLDL in the last 72 hours. ABGs: No results for input(s): PHART, PO2ART, NXD7WTL in the last 72 hours. INR: No results for input(s): INR in the last 72 hours. UA:  Recent Labs     06/12/20  1451   COLORU YELLOW   CLARITYU CLOUDY*   GLUCOSEU Negative   BILIRUBINUR Negative   KETUA Negative   SPECGRAV 1.013   BLOODU MODERATE*   PHUR 5.5   PROTEINU >=300*   UROBILINOGEN 0.2   NITRU Negative   LEUKOCYTESUR LARGE*   LABMICR YES   URINETYPE NotGiven      Urine Microscopic: No results for input(s): LABCAST, BACTERIA, COMU, HYALCAST, WBCUA, RBCUA, EPIU in the last 72 hours. Urine Culture: No results for input(s): LABURIN in the last 72 hours. Urine Chemistry: No results for input(s): Beryle Aliment, PROTEINUR, NAUR in the last 72 hours. IMAGING:  CT LUMBAR SPINE WO CONTRAST   Final Result   Lumbar spine degenerative changes without acute findings. Small left pleural effusion. CT Head WO Contrast   Final Result   No acute intracranial abnormality. XR CHEST PORTABLE   Final Result   1. Cardiomegaly with no evidence of edema   2. Chronic interstitial lung disease                   No intake/output data recorded. Assessment/Plan :      1.  MARIPOSA on CKD 4   MARIPOSA likely pre renal   Also, mild rhabdomyolysis.    Check CK level in am      Got 500 ml iv fluid in ER  Will start NS at 75 ml/ hr for 20 hrs     Hold lasix   Monitor volume status closely   At this time no pulmonary congestion   On RA , no respiratory distress         2. HTN. Controlled   Hold all BP meds   Her BP is usually high   R/o underlying infection causing low BP     3. Anemia of chronic ds. HB 9.5   Monitor     4. Non anion gap acidosis   Check free light chains  SPEP    5. Electrolytes. Monitor     Insert atkinson   Recommend to dose adjust all medications  based on renal functions  Maintain SBP> 90 mmHg   Daily weights   AVOID NSAIDs  Avoid Nephrotoxins  Monitor Intake/Output    She has mentioned clearly that she will not want dialysis. I confirmed with her again.    Also, her daughter is at bedside           Thank you for allowing us to participate in care of Lily [place]         Electronically signed by: Kelsey Schmitz MD, 6/12/2020, 3:05 PM      [facility] [id number]  Office : [phone]  Fax :[phone]

## 2022-05-04 NOTE — PROGRESS NOTES
Pts BS noted to be 64 on AM labs, pt down in dialysis. Went to get a bedside glucose in dialysis and pts sugar remained 64. 8 oz of grape juice provided. [employer] Counseling:  I discussed with the patient the risks of mycophenolate mofetil including but not limited to infection/immunosuppression, GI upset, hypokalemia, hypercholesterolemia, bone marrow suppression, lymphoproliferative disorders, malignancy, GI ulceration/bleed/perforation, colitis, interstitial lung disease, kidney failure, progressive multifocal leukoencephalopathy, and birth defects.  The patient understands that monitoring is required including a baseline creatinine and regular CBC testing. In addition, patient must alert us immediately if symptoms of infection or other concerning signs are noted.